# Patient Record
Sex: FEMALE | Race: BLACK OR AFRICAN AMERICAN | Employment: OTHER | URBAN - METROPOLITAN AREA
[De-identification: names, ages, dates, MRNs, and addresses within clinical notes are randomized per-mention and may not be internally consistent; named-entity substitution may affect disease eponyms.]

---

## 2017-01-18 DIAGNOSIS — E78.01 FAMILIAL HYPERCHOLESTEROLEMIA: ICD-10-CM

## 2017-01-18 RX ORDER — ATORVASTATIN CALCIUM 40 MG/1
40 TABLET, FILM COATED ORAL DAILY
Qty: 30 TAB | Refills: 0 | Status: SHIPPED | OUTPATIENT
Start: 2017-01-18 | End: 2017-04-28 | Stop reason: SDUPTHER

## 2017-01-18 NOTE — TELEPHONE ENCOUNTER
Patient arrived late for appointment 1/18/17, asked that she get a script for cholesterol medication as next appointment is not available until 1/30/17.      LOV- 7/6/2016  Last refill- 12/29/16    Only filled for 30 days

## 2017-02-13 ENCOUNTER — OFFICE VISIT (OUTPATIENT)
Dept: FAMILY MEDICINE CLINIC | Age: 67
End: 2017-02-13

## 2017-02-13 ENCOUNTER — HOSPITAL ENCOUNTER (OUTPATIENT)
Dept: LAB | Age: 67
Discharge: HOME | End: 2017-02-13
Payer: MEDICARE

## 2017-02-13 VITALS
HEART RATE: 100 BPM | TEMPERATURE: 98.2 F | HEIGHT: 66 IN | WEIGHT: 213.6 LBS | DIASTOLIC BLOOD PRESSURE: 78 MMHG | BODY MASS INDEX: 34.33 KG/M2 | OXYGEN SATURATION: 97 % | SYSTOLIC BLOOD PRESSURE: 152 MMHG | RESPIRATION RATE: 12 BRPM

## 2017-02-13 DIAGNOSIS — I10 ESSENTIAL HYPERTENSION: ICD-10-CM

## 2017-02-13 DIAGNOSIS — G47.33 OSA (OBSTRUCTIVE SLEEP APNEA): ICD-10-CM

## 2017-02-13 DIAGNOSIS — Z12.31 VISIT FOR SCREENING MAMMOGRAM: ICD-10-CM

## 2017-02-13 DIAGNOSIS — Z71.89 ACP (ADVANCE CARE PLANNING): ICD-10-CM

## 2017-02-13 DIAGNOSIS — Z00.00 MEDICARE ANNUAL WELLNESS VISIT, SUBSEQUENT: ICD-10-CM

## 2017-02-13 DIAGNOSIS — N39.41 URGE INCONTINENCE OF URINE: ICD-10-CM

## 2017-02-13 DIAGNOSIS — E78.2 MIXED HYPERLIPIDEMIA: ICD-10-CM

## 2017-02-13 DIAGNOSIS — Z23 ENCOUNTER FOR IMMUNIZATION: ICD-10-CM

## 2017-02-13 DIAGNOSIS — L30.9 ECZEMA, UNSPECIFIED TYPE: ICD-10-CM

## 2017-02-13 PROCEDURE — 85027 COMPLETE CBC AUTOMATED: CPT

## 2017-02-13 PROCEDURE — 80053 COMPREHEN METABOLIC PANEL: CPT

## 2017-02-13 PROCEDURE — 36415 COLL VENOUS BLD VENIPUNCTURE: CPT

## 2017-02-13 PROCEDURE — 83036 HEMOGLOBIN GLYCOSYLATED A1C: CPT

## 2017-02-13 PROCEDURE — 82043 UR ALBUMIN QUANTITATIVE: CPT

## 2017-02-13 PROCEDURE — 80061 LIPID PANEL: CPT

## 2017-02-13 RX ORDER — INSULIN ASPART 100 [IU]/ML
14-20 INJECTION, SOLUTION INTRAVENOUS; SUBCUTANEOUS 2 TIMES DAILY
COMMUNITY
Start: 2017-02-13 | End: 2021-06-18

## 2017-02-13 RX ORDER — PEN NEEDLE, DIABETIC 31 GX5/16"
NEEDLE, DISPOSABLE MISCELLANEOUS
Refills: 1 | COMMUNITY
Start: 2016-11-21

## 2017-02-13 RX ORDER — VALSARTAN 160 MG/1
160 TABLET ORAL DAILY
Qty: 30 TAB | Refills: 5 | Status: SHIPPED | OUTPATIENT
Start: 2017-02-13 | End: 2017-05-03 | Stop reason: DRUGHIGH

## 2017-02-13 RX ORDER — FLUCONAZOLE 150 MG/1
150 TABLET ORAL DAILY
Qty: 1 TAB | Refills: 0 | Status: SHIPPED | OUTPATIENT
Start: 2017-02-13 | End: 2017-02-14

## 2017-02-13 RX ORDER — TRIAMCINOLONE ACETONIDE 1 MG/G
CREAM TOPICAL 2 TIMES DAILY
Qty: 80 G | Refills: 1 | Status: SHIPPED | OUTPATIENT
Start: 2017-02-13 | End: 2018-01-13

## 2017-02-13 NOTE — PATIENT INSTRUCTIONS
Learning About High Cholesterol  What is high cholesterol? Cholesterol is a type of fat in your blood. It is needed for many body functions, such as making new cells. Cholesterol is made by your body. It also comes from food you eat. If you have too much cholesterol, it starts to build up in your arteries. This is called hardening of the arteries, or atherosclerosis. High cholesterol raises your risk of a heart attack and stroke. There are different types of cholesterol. LDL is the \"bad\" cholesterol. High LDL can raise your risk for heart disease, heart attack, and stroke. HDL is the \"good\" cholesterol. High HDL is linked with a lower risk for heart disease, heart attack, and stroke. Your cholesterol levels help your doctor find out your risk for having a heart attack or stroke. How can you prevent high cholesterol? A heart-healthy lifestyle can help you prevent high cholesterol. This lifestyle helps lower your risk for a heart attack and stroke. · Eat heart-healthy foods. ¨ Eat fruits, vegetables, whole grains (like oatmeal), dried beans and peas, nuts and seeds, soy products (like tofu), and fat-free or low-fat dairy products. ¨ Replace butter, margarine, and hydrogenated or partially hydrogenated oils with olive and canola oils. (Canola oil margarine without trans fat is fine.)  ¨ Replace red meat with fish, poultry, and soy protein (like tofu). ¨ Limit processed and packaged foods like chips, crackers, and cookies. · Be active. Exercise can improve your cholesterol level. Get at least 30 minutes of exercise on most days of the week. Walking is a good choice. You also may want to do other activities, such as running, swimming, cycling, or playing tennis or team sports. · Stay at a healthy weight. Lose weight if you need to. · Don't smoke. If you need help quitting, talk to your doctor about stop-smoking programs and medicines. These can increase your chances of quitting for good.   How is high cholesterol treated? The goal of treatment is to reduce your chances of having a heart attack or stroke. The goal is not to lower your cholesterol numbers only. · You may make lifestyle changes, such as eating healthy foods, not smoking, losing weight, and being more active. · You may have to take medicine. Follow-up care is a key part of your treatment and safety. Be sure to make and go to all appointments, and call your doctor if you are having problems. It's also a good idea to know your test results and keep a list of the medicines you take. Where can you learn more? Go to http://vignesh-nini.info/. Enter W157 in the search box to learn more about \"Learning About High Cholesterol. \"  Current as of: January 27, 2016  Content Version: 11.1  © 2722-6677 [employer]. Care instructions adapted under license by [facility] (which disclaims liability or warranty for this information). If you have questions about a medical condition or this instruction, always ask your healthcare professional. [place] [address] any warranty or liability for your use of this information.

## 2017-02-13 NOTE — PROGRESS NOTES
HISTORY OF PRESENT ILLNESS  Hugh Walden is a 73 y.o. female. HPI  Cardiovascular Review:  The patient has diabetes, hypertension and hyperlipidemia. Patient has sleep apnea but is not using her CPAP consistently. Diet and Lifestyle: not attempting to follow a low fat, low cholesterol diet, not attempting to follow a low sodium diet, does not rigorously follow a diabetic diet, exercises sporadically, nonsmoker  Home BP Monitoring: is not measured at home. Pertinent ROS: Taking medications regularly as instructed, no TIA's, no chest pain on exertion, no dyspnea on exertion, no swelling of ankles. Patient reports urge incontinence x last 6 months. Symptoms worse at night per patient. Requests Diflucan for yeast infection. Diabetes Mellitus:  She has diabetes mellitus, and  hypertension and hyperlipidemia. Seen by endocrinology - Dr Stewart Griffith. Taking Levemir 55 units QHS and Novolog 12-16 units before meals. Home glucose monitoring: fasting values range 170-200, nonfasting values range 200-300. Current Outpatient Prescriptions   Medication Sig Dispense Refill    BD INSULIN PEN NEEDLE UF MINI 31 gauge x 3/16\" ndle use four times a day for INSULIN INJECTIONS as directed  1    triamcinolone acetonide (KENALOG) 0.1 % topical cream Apply  to affected area two (2) times a day. use thin layer 80 g 1    valsartan (DIOVAN) 160 mg tablet Take 1 Tab by mouth daily. For blood pressure 30 Tab 5    pneumococcal 13 eric conj dip (PREVNAR-13) 0.5 mL syrg injection 0.5 mL by IntraMUSCular route once for 1 dose. 0.5 mL 0    insulin detemir (LEVEMIR FLEXPEN) 100 unit/mL (3 mL) inpn 55 Units by SubCUTAneous route nightly.  insulin aspart (NOVOLOG FLEXPEN) 100 unit/mL inpn 12 Units by SubCUTAneous route Before breakfast, lunch, and dinner.  fluconazole (DIFLUCAN) 150 mg tablet Take 1 Tab by mouth daily for 1 day. FDA advises cautious prescribing of oral fluconazole in pregnancy.  1 Tab 0    atorvastatin (LIPITOR) 40 mg tablet Take 1 Tab by mouth daily. For cholesterol 30 Tab 0    albuterol (PROVENTIL HFA, VENTOLIN HFA, PROAIR HFA) 90 mcg/actuation inhaler Take 1-2 Puffs by inhalation every four (4) hours as needed for Wheezing. 1 Inhaler 1    diclofenac EC (VOLTAREN) 75 mg EC tablet Take 1 Tab by mouth two (2) times a day. 20 Tab 0    ONETOUCH ULTRA TEST strip   0    halobetasol (ULTRAVATE) 0.05 % ointment   0    glimepiride (AMARYL) 4 mg tablet take 1 tablet every morning 30 Tab 0    metFORMIN (GLUCOPHAGE) 500 mg tablet Take 1,000 mg by mouth two (2) times daily (with meals).  Cyanocobalamin-Cobamamide (B-12 PLUS) 5,000-100 mcg subl by SubLINGual route daily.  ibuprofen (MOTRIN) 600 mg tablet Take 1 tablet by mouth every eight (8) hours as needed for Pain.  20 tablet 1     Past Medical History   Diagnosis Date    Allergic rhinitis     DDD (degenerative disc disease)      thoracic spine    DJD (degenerative joint disease) of cervical spine     Eczema     Hypertension     Lichen sclerosus      [facility]    ANMOL (obstructive sleep apnea)      CPAP    Other and unspecified hyperlipidemia     Reflux esophagitis     Type II or unspecified type diabetes mellitus without mention of complication, not stated as uncontrolled       Lab Results   Component Value Date/Time    Hemoglobin A1c 8.8 03/21/2016 01:20 PM    Hemoglobin A1c 8.8 12/23/2014 04:04 PM    Hemoglobin A1c 10.5 10/15/2014 02:58 PM    Hemoglobin A1c, External 9.2 01/21/2016    Glucose 81 03/21/2016 01:20 PM    Glucose (POC) 138 12/30/2014 02:08 PM    Microalb/Creat ratio (ug/mg creat.) 27.2 11/11/2015 12:10 PM    LDL, calculated 87 03/21/2016 01:20 PM    Creatinine 0.84 03/21/2016 01:20 PM      Lab Results   Component Value Date/Time    Cholesterol, total 167 03/21/2016 01:20 PM    HDL Cholesterol 63 03/21/2016 01:20 PM    LDL, calculated 87 03/21/2016 01:20 PM    Triglyceride 85 03/21/2016 01:20 PM    CHOL/HDL Ratio 3.5 08/30/2010 10:02 AM        Review of Systems   Constitutional: Negative for malaise/fatigue and weight loss. Respiratory: Negative for shortness of breath. Cardiovascular: Negative for chest pain, palpitations and leg swelling. Gastrointestinal: Negative for heartburn. Musculoskeletal: Negative for back pain, joint pain and myalgias. Neurological: Negative for dizziness, weakness and headaches. Psychiatric/Behavioral: Negative for depression. Physical Exam   Constitutional: She is oriented to person, place, and time. She appears well-developed and well-nourished. Neck: Normal range of motion. Neck supple. No JVD present. Carotid bruit is not present. No thyromegaly present. Cardiovascular: Normal rate, regular rhythm and intact distal pulses. Exam reveals no gallop and no friction rub. No murmur heard. Pulmonary/Chest: Effort normal and breath sounds normal. No respiratory distress. Musculoskeletal: She exhibits no edema. Lymphadenopathy:     She has no cervical adenopathy. Neurological: She is alert and oriented to person, place, and time. Skin:        Psychiatric: She has a normal mood and affect. Her behavior is normal.   Nursing note and vitals reviewed. Diabetic foot exam - no evidence of ulcerations of infection, diminished sensation with filament testing. Pedal pulse palpable. ASSESSMENT and PLAN  Spencer Vogt was seen today for diabetes, cholesterol problem and rash. Diagnoses and all orders for this visit:    Uncontrolled type 2 diabetes mellitus without complication, with long-term current use of insulin ([address]  Managed by endocrinology, no changes to current treatment plan. -     HEMOGLOBIN A1C WITH EAG  -     MICROALBUMIN, UR, RAND W/ MICROALBUMIN/CREA RATIO    Essential hypertension  Not to goal. Increase to Valsartan to 100 mg  -     METABOLIC PANEL, COMPREHENSIVE  -     CBC W/O DIFF  -     valsartan (DIOVAN) 160 mg tablet; Take 1 Tab by mouth daily.  For blood pressure    Mixed hyperlipidemia  -     Recheck LIPID PANEL    ANMOL (obstructive sleep apnea)  Discussed importance of CPAP compliance. Eczema, unspecified type  -     triamcinolone acetonide (KENALOG) 0.1 % topical cream; Apply  to affected area two (2) times a day. use thin layer    Urge incontinence of urine  -     REFERRAL TO UROGYNECOLOGY    Encounter for immunization  -     pneumococcal 13 eric conj dip (PREVNAR-13) 0.5 mL syrg injection; 0.5 mL by IntraMUSCular route once for 1 dose. Visit for screening mammogram  -     [facility] MAMMO BI SCREENING INCL CAD; Future    Medicare annual wellness visit, subsequent    ACP (advance care planning)  Discussed importance of living will; paperwork provided on [employer]    Other orders  -     fluconazole (DIFLUCAN) 150 mg tablet; Take 1 Tab by mouth daily for 1 day. FDA advises cautious prescribing of oral fluconazole in pregnancy. I have discussed the diagnosis with the patient and the intended plan as seen in the above orders. The patient has received an after-visit summary along with patient information handout. I have discussed medication side effects and warnings with the patient as well. Follow-up Disposition:  Return in about 6 months (around 8/13/2017) for cholesterol and blood pressure.

## 2017-02-13 NOTE — PROGRESS NOTES
Douglas Mcdonald is a 73 y.o. female and presents for annual Medicare Wellness Visit. Problem List: Reviewed with patient and discussed risk factors.     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    Reflux esophagitis K21.0    Degeneration of intervertebral disc, site unspecified [id number]    Cervical spondylosis without myelopathy M47.812    Contact dermatitis and other eczema, due to unspecified cause L25.9    Allergic rhinitis, cause unspecified J30.9    Type II diabetes mellitus, uncontrolled (HCC) E11.65    Vitamin D deficiency O83.5    Lichen sclerosus N12.3    Hypertension I10    ACP (advance care planning) Z71.89       Current medical providers:  Patient Care Team:  Edilia Gerard MD as PCP - General  Aron Ponce MD (Endocrinology)  Dong Witt    PSH: Reviewed with patient  Past Surgical History   Procedure Laterality Date    Hx tonsil and adenoidectomy      Hx hip replacement  2004     rt    Pr dcmprn perq nucleus pulposus 1/> levels lumbar  2003     L3-4    Pr palatophayngoplasty  2002    Hx tubal ligation      Hx gyn       benign tumors removed from uterus    Hx colonoscopy  2013    Hx heent       sinus surgery for snoring    Hx back surgery       x 3    Hx orthopaedic Bilateral July 2013     hip replacement        SH: Reviewed with patient  Social History   Substance Use Topics    Smoking status: Never Smoker    Smokeless tobacco: Never Used    Alcohol use Yes      Comment: occasional       FH: Reviewed with patient  Family History   Problem Relation Age of Onset    Cancer Maternal Grandfather      lung    Cancer Paternal Grandmother     Cancer Paternal Grandfather      colon    Heart Attack Mother     Dementia Father      alzheimers    Dementia Maternal Grandmother      alzheimers       Medications/Allergies: Reviewed with patient  Current Outpatient Prescriptions on File Prior to Visit   Medication Sig Dispense Refill    atorvastatin (LIPITOR) 40 mg tablet Take 1 Tab by mouth daily. For cholesterol 30 Tab 0    albuterol (PROVENTIL HFA, VENTOLIN HFA, PROAIR HFA) 90 mcg/actuation inhaler Take 1-2 Puffs by inhalation every four (4) hours as needed for Wheezing. 1 Inhaler 1    diclofenac EC (VOLTAREN) 75 mg EC tablet Take 1 Tab by mouth two (2) times a day. 20 Tab 0    ONETOUCH ULTRA TEST strip   0    halobetasol (ULTRAVATE) 0.05 % ointment   0    glimepiride (AMARYL) 4 mg tablet take 1 tablet every morning 30 Tab 0    metFORMIN (GLUCOPHAGE) 500 mg tablet Take 1,000 mg by mouth two (2) times daily (with meals).  Cyanocobalamin-Cobamamide (B-12 PLUS) 5,000-100 mcg subl by SubLINGual route daily.  ibuprofen (MOTRIN) 600 mg tablet Take 1 tablet by mouth every eight (8) hours as needed for Pain. 20 tablet 1     No current facility-administered medications on file prior to visit. Allergies   Allergen Reactions    Aspirin Nausea and Vomiting     Can take a coated aspirin.  Ciprocinonide Swelling    Crestor [Rosuvastatin] Myalgia    Seafood [Shellfish Containing Products] Swelling     Pt states she does not have a problem with shellfish. Had previous problem with scallops, but has eaten since.  Sulfa (Sulfonamide Antibiotics) Hives       Objective:  Visit Vitals    /78 (BP 1 Location: Left arm, BP Patient Position: Sitting)    Pulse 100    Temp 98.2 °F (36.8 °C) (Oral)    Resp 12    Ht 5' 6\" (1.676 m)    Wt 213 lb 9.6 oz (96.9 kg)    LMP 09/23/2009    SpO2 97%    BMI 34.48 kg/m2    Body mass index is 34.48 kg/(m^2).     Assessment of cognitive impairment: Alert and oriented x 3    Depression Screen:   PHQ 2 / 9, over the last two weeks 2/13/2017   Little interest or pleasure in doing things Not at all   Feeling down, depressed or hopeless Not at all   Total Score PHQ 2 0       Fall Risk Assessment:    Fall Risk Assessment, last 12 mths 2/13/2017   Able to walk? Yes   Fall in past 12 months? No       Functional Ability:   Does the patient exhibit a steady gait? yes   How long did it take the patient to get up and walk from a sitting position? 3 sec   Is the patient self reliant?  (ie can do own laundry, meals, household chores)  yes     Does the patient handle his/her own medications? yes     Does the patient handle his/her own money? yes     Is the patients home safe (ie good lighting, handrails on stairs and bath, etc.)? yes     Did you notice or did patient express any hearing difficulties? no     Did you notice or did patient express any vision difficulties?   no     Were distance and reading eye charts used? no       Advance Care Planning:   Patient was offered the opportunity to discuss advance care planning:  yes     Does patient have an Advance Directive:  no   If no, did you provide information on Caring Connections?   yes       Plan:      Orders Placed This Encounter    [facility] MAMMO BI SCREENING INCL CAD    LIPID PANEL    METABOLIC PANEL, COMPREHENSIVE    HEMOGLOBIN A1C WITH EAG    MICROALBUMIN, UR, RAND W/ MICROALBUMIN/CREA RATIO    CBC W/O DIFF    REFERRAL TO UROGYNECOLOGY    BD INSULIN PEN NEEDLE UF MINI 31 gauge x 3/16\" ndle    triamcinolone acetonide (KENALOG) 0.1 % topical cream    valsartan (DIOVAN) 160 mg tablet    pneumococcal 13 eric conj dip (PREVNAR-13) 0.5 mL syrg injection    insulin detemir (LEVEMIR FLEXPEN) 100 unit/mL (3 mL) inpn    insulin aspart (NOVOLOG FLEXPEN) 100 unit/mL inpn    fluconazole (DIFLUCAN) 150 mg tablet       Health Maintenance   Topic Date Due    EYE EXAM RETINAL OR DILATED Q1  02/26/2014    GLAUCOMA SCREENING Q2Y  09/16/2016    Pneumococcal 65+ Low/Medium Risk (1 of 2 - PCV13) 09/16/2016    HEMOGLOBIN A1C Q6M  09/21/2016    MEDICARE YEARLY EXAM  11/11/2016    FOOT EXAM Q1  11/11/2016    MICROALBUMIN Q1  11/11/2016    LIPID PANEL Q1  03/21/2017    BREAST CANCER SCRN MAMMOGRAM  12/17/2017    COLONOSCOPY  02/11/2023    DTaP/Tdap/Td series (2 - Td) 02/13/2027    Hepatitis C Screening  Addressed    OSTEOPOROSIS SCREENING (DEXA)  Completed    ZOSTER VACCINE AGE 60>  Addressed    INFLUENZA AGE 9 TO ADULT  Addressed       *Patient verbalized understanding and agreement with the plan. A copy of the After Visit Summary with personalized health plan was given to the patient today.

## 2017-02-13 NOTE — MR AVS SNAPSHOT
Visit Information Date & Time Provider Department Dept. Phone Encounter #  
 2/13/2017 10:45 AM Jesus Koo  [facility] [phone] [id number] Follow-up Instructions Return in about 6 months (around 8/13/2017) for cholesterol and blood pressure. Upcoming Health Maintenance Date Due  
 EYE EXAM RETINAL OR DILATED Q1 2/26/2014 GLAUCOMA SCREENING Q2Y 9/16/2016 Pneumococcal 65+ Low/Medium Risk (1 of 2 - PCV13) 9/16/2016 HEMOGLOBIN A1C Q6M 9/21/2016 MEDICARE YEARLY EXAM 11/11/2016 FOOT EXAM Q1 11/11/2016 MICROALBUMIN Q1 11/11/2016 LIPID PANEL Q1 3/21/2017 BREAST CANCER SCRN MAMMOGRAM 12/17/2017 COLONOSCOPY 2/11/2023 DTaP/Tdap/Td series (2 - Td) 2/13/2027 Allergies as of 2/13/2017  Review Complete On: 2/13/2017 By: Kelly Walker LPN Severity Noted Reaction Type Reactions Aspirin  03/06/2010    Nausea and Vomiting Can take a coated aspirin. Ciprocinonide  03/06/2010    Swelling Crestor [Rosuvastatin]  03/06/2010    Myalgia Seafood [Shellfish Containing Products]  03/06/2010    Swelling Pt states she does not have a problem with shellfish. Had previous problem with scallops, but has eaten since. Sulfa (Sulfonamide Antibiotics)  03/06/2010    Hives Current Immunizations  Reviewed on 10/8/2012 Name Date  
 dT Vaccine 10/25/2000 Not reviewed this visit You Were Diagnosed With   
  
 Codes Comments Uncontrolled type 2 diabetes mellitus without complication, with long-term current use of insulin ([facility] [address]    -  Primary ICD-10-CM: E11.65, Z79.4 ICD-9-CM: 250.02, V58.67 Essential hypertension     ICD-10-CM: I10 
ICD-9-CM: 401.9 Mixed hyperlipidemia     ICD-10-CM: E78.2 ICD-9-CM: 272.2   
 ANMOL (obstructive sleep apnea)     ICD-10-CM: G47.33 
ICD-9-CM: 327.23 Medicare annual wellness visit, subsequent     ICD-10-CM: Z00.00 ICD-9-CM: V70.0 ACP (advance care planning)     ICD-10-CM: Z71.89 ICD-9-CM: V65.49 Eczema, unspecified type     ICD-10-CM: L30.9 ICD-9-CM: 692.9 Urge incontinence of urine     ICD-10-CM: N39.41 
ICD-9-CM: 788.31 Encounter for immunization     ICD-10-CM: B14 ICD-9-CM: V03.89 Visit for screening mammogram     ICD-10-CM: Z12.31 
ICD-9-CM: V76.12 Vitals BP Pulse Temp Resp Height(growth percentile) Weight(growth percentile) 152/78 (BP 1 Location: Left arm, BP Patient Position: Sitting) 100 98.2 °F (36.8 °C) (Oral) 12 5' 6\" (1.676 m) 213 lb 9.6 oz (96.9 kg) LMP SpO2 BMI OB Status Smoking Status 09/23/2009 97% 34.48 kg/m2 Postmenopausal Never Smoker Vitals History BMI and BSA Data Body Mass Index Body Surface Area 34.48 kg/m 2 2.12 m 2 [facility]erred [facility] [address] [phone] Your Updated Medication List  
  
   
This list is accurate as of: 2/13/17 11:37 AM.  Always use your most recent med list.  
  
  
  
  
 albuterol 90 mcg/actuation inhaler Commonly known as:  PROVENTIL HFA, VENTOLIN HFA, PROAIR HFA Take 1-2 Puffs by inhalation every four (4) hours as needed for Wheezing. atorvastatin 40 mg tablet Commonly known as:  LIPITOR Take 1 Tab by mouth daily. For cholesterol B-12 PLUS 5,000-100 mcg Subl Generic drug:  Cyanocobalamin-Cobamamide  
by SubLINGual route daily. BD INSULIN PEN NEEDLE UF MINI 31 gauge x 3/16\" Ndle Generic drug:  Insulin Needles (Disposable)  
use four times a day for INSULIN INJECTIONS as directed  
  
 diclofenac EC 75 mg EC tablet Commonly known as:  VOLTAREN Take 1 Tab by mouth two (2) times a day. fluconazole 150 mg tablet Commonly known as:  DIFLUCAN Take 1 Tab by mouth daily for 1 day. FDA advises cautious prescribing of oral fluconazole in pregnancy. glimepiride 4 mg tablet Commonly known as:  AMARYL  
take 1 tablet every morning  
  
 halobetasol 0.05 % ointment Commonly known as:  ULTRAVATE  
  
 ibuprofen 600 mg tablet Commonly known as:  MOTRIN Take 1 tablet by mouth every eight (8) hours as needed for Pain. LEVEMIR FLEXPEN 100 unit/mL (3 mL) In Generic drug:  insulin detemir 55 Units by SubCUTAneous route nightly. metFORMIN 500 mg tablet Commonly known as:  GLUCOPHAGE Take 1,000 mg by mouth two (2) times daily (with meals). NovoLOG Flexpen 100 unit/mL Inpn Generic drug:  insulin aspart 12 Units by SubCUTAneous route Before breakfast, lunch, and dinner. ONETOUCH ULTRA TEST strip Generic drug:  glucose blood VI test strips  
  
 pneumococcal 13 eric conj dip 0.5 mL Syrg injection Commonly known as:  PREVNAR-13  
0.5 mL by IntraMUSCular route once for 1 dose. triamcinolone acetonide 0.1 % topical cream  
Commonly known as:  KENALOG Apply  to affected area two (2) times a day. use thin layer  
  
 valsartan 160 mg tablet Commonly known as:  DIOVAN Take 1 Tab by mouth daily. For blood pressure Prescriptions Printed Refills  
 pneumococcal 13 eric conj dip (PREVNAR-13) 0.5 mL syrg injection 0 Si.5 mL by IntraMUSCular route once for 1 dose. Class: Print Route: IntraMUSCular Prescriptions Sent to Pharmacy Refills  
 triamcinolone acetonide (KENALOG) 0.1 % topical cream 1 Sig: Apply  to affected area two (2) times a day. use thin layer Class: Normal  
 [facility] [phone] Route: Topical  
 valsartan (DIOVAN) 160 mg tablet 5 Sig: Take 1 Tab by mouth daily. For blood pressure Class: Normal  
 Pharmacy: [facility] Ph #: [phone]  Route: Oral  
 fluconazole (DIFLUCAN) 150 mg tablet 0  
 Sig: Take 1 Tab by mouth daily for 1 day. FDA advises cautious prescribing of oral fluconazole in pregnancy. Class: Normal  
 [facility]: [facility] Ph #: [phone] Route: Oral  
  
We Performed the Following CBC W/O DIFF [20011 CPT(R)] HEMOGLOBIN A1C WITH EAG [49517 CPT(R)] LIPID PANEL [72362 CPT(R)] METABOLIC PANEL, COMPREHENSIVE [56598 CPT(R)] MICROALBUMIN, UR, RAND W/ MICROALBUMIN/CREA RATIO [id number] CPT(R)] REFERRAL TO UROGYNECOLOGY [id number] CPT(R)] Comments:  
 Please evaluate patient for urge incontinence. Follow-up Instructions Return in about 6 months (around 8/13/2017) for cholesterol and blood pressure. To-Do List   
 Around 02/17/2017 Imaging:  [facility] MAMMO BI SCREENING INCL CAD Referral Information Referral ID Referred By Referred To  
  
 [id number] Brody Garvin [facility] [address], [address] Visits Status Start Date End Date 1 New Request 2/13/17 2/13/18 If your referral has a status of pending review or denied, additional information will be sent to support the outcome of this decision. Patient Instructions Learning About High Cholesterol What is high cholesterol? Cholesterol is a type of fat in your blood. It is needed for many body functions, such as making new cells. Cholesterol is made by your body. It also comes from food you eat. If you have too much cholesterol, it starts to build up in your arteries. This is called hardening of the arteries, or atherosclerosis. High cholesterol raises your risk of a heart attack and stroke. There are different types of cholesterol. LDL is the \"bad\" cholesterol. High LDL can raise your risk for heart disease, heart attack, and stroke. HDL is the \"good\" cholesterol.  High HDL is linked with a lower risk for heart disease, heart attack, and stroke. Your cholesterol levels help your doctor find out your risk for having a heart attack or stroke. How can you prevent high cholesterol? A heart-healthy lifestyle can help you prevent high cholesterol. This lifestyle helps lower your risk for a heart attack and stroke. · Eat heart-healthy foods. ¨ Eat fruits, vegetables, whole grains (like oatmeal), dried beans and peas, nuts and seeds, soy products (like tofu), and fat-free or low-fat dairy products. ¨ Replace butter, margarine, and hydrogenated or partially hydrogenated oils with olive and canola oils. (Canola oil margarine without trans fat is fine.) ¨ Replace red meat with fish, poultry, and soy protein (like tofu). ¨ Limit processed and packaged foods like chips, crackers, and cookies. · Be active. Exercise can improve your cholesterol level. Get at least 30 minutes of exercise on most days of the week. Walking is a good choice. You also may want to do other activities, such as running, swimming, cycling, or playing tennis or team sports. · Stay at a healthy weight. Lose weight if you need to. · Don't smoke. If you need help quitting, talk to your doctor about stop-smoking programs and medicines. These can increase your chances of quitting for good. How is high cholesterol treated? The goal of treatment is to reduce your chances of having a heart attack or stroke. The goal is not to lower your cholesterol numbers only. · You may make lifestyle changes, such as eating healthy foods, not smoking, losing weight, and being more active. · You may have to take medicine. Follow-up care is a key part of your treatment and safety. Be sure to make and go to all appointments, and call your doctor if you are having problems. It's also a good idea to know your test results and keep a list of the medicines you take. Where can you learn more? Go to http://vignesh-nini.info/. Enter T206 in the search box to learn more about \"Learning About High Cholesterol. \" Current as of: January 27, 2016 Content Version: 11.1 © 9949-5626 [facility]. Care instructions adapted under license by [employer] (which disclaims liability or warranty for this information). If you have questions about a medical condition or this instruction, always ask your healthcare professional. [address] any warranty or liability for your use of this information. Introducing [facility]! Hedy John introduces Gde[employer] patient portal. Now you can access parts of your medical record, email your doctor's office, and request medication refills online. 1. In your internet browser, go to [email or web address] 2. Click on the First Time User? Click Here link in the Sign In box. You will see the New Member Sign Up page. 3. Enter your GdeSlon Access Code exactly as it appears below. You will not need to use this code after youve completed the sign-up process. If you do not sign up before the expiration date, you must request a new code. · Gde[employer]on Access Code: [email or web address] Expires: 5/14/2017 11:37 AM 
 
4. Enter the last four digits of your Social Security Number (xxxx) and Date of Birth (mm/dd/yyyy) as indicated and click Submit. You will be taken to the next sign-up page. 5. Create a GdeSlon ID. This will be your GdeSlon login ID and cannot be changed, so think of one that is secure and easy to remember. 6. Create a GdeSlon password. You can change your password at any time. 7. Enter your Password Reset Question and Answer. This can be used at a later time if you forget your password. 8. Enter your e-mail address. You will receive e-mail notification when new information is available in [address]. 9. Click Sign Up. You can now view and download portions of your medical record. 10. Click the Download Summary menu link to download a portable copy of your medical information. If you have questions, please visit the Frequently Asked Questions section of the [employer] website. Remember, [employer] is NOT to be used for urgent needs. For medical emergencies, dial 911. Now available from your iPhone and Android! Please provide this summary of care documentation to your next provider. Your primary care clinician is listed as [place] [address], [facility]/s . If you have any questions after today's visit, please call [phone].

## 2017-02-13 NOTE — PROGRESS NOTES
1. Have you been to the ER, urgent care clinic since your last visit? Hospitalized since your last visit? No    2. Have you seen or consulted any other health care providers outside of the [address] [place] [address] since your last visit? Include any pap smears or colon screening. No       Chief Complaint   Patient presents with    Diabetes    Cholesterol Problem    Rash     on the back angie and rists of both hands- for the last 2-3 months     Would like to have stool test done.

## 2017-02-14 LAB
ALBUMIN SERPL-MCNC: 4.6 G/DL (ref 3.6–4.8)
ALBUMIN/CREAT UR: 35.7 MG/G CREAT (ref 0–30)
ALBUMIN/GLOB SERPL: 1.5 {RATIO} (ref 1.1–2.5)
ALP SERPL-CCNC: 167 IU/L (ref 39–117)
ALT SERPL-CCNC: 17 IU/L (ref 0–32)
AST SERPL-CCNC: 14 IU/L (ref 0–40)
BILIRUB SERPL-MCNC: 0.3 MG/DL (ref 0–1.2)
BUN SERPL-MCNC: 16 MG/DL (ref 8–27)
BUN/CREAT SERPL: 17 (ref 11–26)
CALCIUM SERPL-MCNC: 10.2 MG/DL (ref 8.7–10.3)
CHLORIDE SERPL-SCNC: 98 MMOL/L (ref 96–106)
CHOLEST SERPL-MCNC: 189 MG/DL (ref 100–199)
CO2 SERPL-SCNC: 25 MMOL/L (ref 18–29)
CREAT SERPL-MCNC: 0.96 MG/DL (ref 0.57–1)
CREAT UR-MCNC: 152.4 MG/DL
ERYTHROCYTE [DISTWIDTH] IN BLOOD BY AUTOMATED COUNT: 15.9 % (ref 12.3–15.4)
EST. AVERAGE GLUCOSE BLD GHB EST-MCNC: 243 MG/DL
GLOBULIN SER CALC-MCNC: 3.1 G/DL (ref 1.5–4.5)
GLUCOSE SERPL-MCNC: 114 MG/DL (ref 65–99)
HBA1C MFR BLD: 10.1 % (ref 4.8–5.6)
HCT VFR BLD AUTO: 37.2 % (ref 34–46.6)
HDLC SERPL-MCNC: 61 MG/DL
HGB BLD-MCNC: 11.7 G/DL (ref 11.1–15.9)
INTERPRETATION, 910389: NORMAL
LDLC SERPL CALC-MCNC: 104 MG/DL (ref 0–99)
MCH RBC QN AUTO: 24.5 PG (ref 26.6–33)
MCHC RBC AUTO-ENTMCNC: 31.5 G/DL (ref 31.5–35.7)
MCV RBC AUTO: 78 FL (ref 79–97)
MICROALBUMIN UR-MCNC: 54.4 UG/ML
PLATELET # BLD AUTO: 333 X10E3/UL (ref 150–379)
POTASSIUM SERPL-SCNC: 4.4 MMOL/L (ref 3.5–5.2)
PROT SERPL-MCNC: 7.7 G/DL (ref 6–8.5)
RBC # BLD AUTO: 4.78 X10E6/UL (ref 3.77–5.28)
SODIUM SERPL-SCNC: 142 MMOL/L (ref 134–144)
TRIGL SERPL-MCNC: 121 MG/DL (ref 0–149)
VLDLC SERPL CALC-MCNC: 24 MG/DL (ref 5–40)
WBC # BLD AUTO: 11.2 X10E3/UL (ref 3.4–10.8)

## 2017-02-19 DIAGNOSIS — D72.829 LEUKOCYTOSIS, UNSPECIFIED TYPE: Primary | ICD-10-CM

## 2017-02-27 ENCOUNTER — HOSPITAL ENCOUNTER (OUTPATIENT)
Dept: MAMMOGRAPHY | Age: 67
Discharge: HOME | End: 2017-02-27
Payer: MEDICARE

## 2017-02-27 DIAGNOSIS — Z12.31 VISIT FOR SCREENING MAMMOGRAM: ICD-10-CM

## 2017-02-27 PROCEDURE — 77067 SCR MAMMO BI INCL CAD: CPT

## 2017-04-28 DIAGNOSIS — E78.01 FAMILIAL HYPERCHOLESTEROLEMIA: ICD-10-CM

## 2017-04-28 RX ORDER — ATORVASTATIN CALCIUM 40 MG/1
40 TABLET, FILM COATED ORAL DAILY
Qty: 30 TAB | Refills: 5 | Status: SHIPPED | OUTPATIENT
Start: 2017-04-28 | End: 2018-02-20 | Stop reason: SDUPTHER

## 2017-04-28 NOTE — TELEPHONE ENCOUNTER
Received faxed refill request for this medication from the pharmacy that is on file. atorvastatin (LIPITOR) 40 mg tablet  Allergy/Contraindication:RosuvastatinReactions:Myalgia  Take 1 Tab by mouth daily.  For cholesterol, Normal, Disp-30 Tab, R-0

## 2017-05-03 ENCOUNTER — OFFICE VISIT (OUTPATIENT)
Dept: FAMILY MEDICINE CLINIC | Age: 67
End: 2017-05-03

## 2017-05-03 VITALS
OXYGEN SATURATION: 99 % | TEMPERATURE: 98.2 F | BODY MASS INDEX: 35.32 KG/M2 | RESPIRATION RATE: 12 BRPM | HEART RATE: 96 BPM | HEIGHT: 66 IN | SYSTOLIC BLOOD PRESSURE: 151 MMHG | DIASTOLIC BLOOD PRESSURE: 80 MMHG | WEIGHT: 219.8 LBS

## 2017-05-03 DIAGNOSIS — I10 ESSENTIAL HYPERTENSION: ICD-10-CM

## 2017-05-03 DIAGNOSIS — Z23 ENCOUNTER FOR IMMUNIZATION: ICD-10-CM

## 2017-05-03 DIAGNOSIS — R60.9 PERIPHERAL EDEMA: Primary | ICD-10-CM

## 2017-05-03 RX ORDER — HYDROCHLOROTHIAZIDE 25 MG/1
25 TABLET ORAL DAILY
Qty: 30 TAB | Refills: 5 | Status: SHIPPED | OUTPATIENT
Start: 2017-05-03 | End: 2017-07-13 | Stop reason: ALTCHOICE

## 2017-05-03 RX ORDER — VALSARTAN 80 MG/1
80 TABLET ORAL
COMMUNITY
Start: 2017-05-03 | End: 2019-01-09

## 2017-05-03 RX ORDER — INSULIN DEGLUDEC INJECTION 200 U/ML
INJECTION, SOLUTION SUBCUTANEOUS
Refills: 0 | COMMUNITY
Start: 2017-04-26 | End: 2017-09-29 | Stop reason: ALTCHOICE

## 2017-05-03 RX ORDER — LATANOPROST 50 UG/ML
SOLUTION/ DROPS OPHTHALMIC
Refills: 0 | COMMUNITY
Start: 2017-04-27 | End: 2018-02-26

## 2017-05-03 NOTE — PROGRESS NOTES
Marino Parnell is a 73 y.o. female who was seen in clinic today (5/3/2017). Subjective:  Cardiovascular Review:  The patient has diabetes, hypertension and hyperlipidemia. Patient has sleep apnea but is not using her CPAP consistently. Diet and Lifestyle: not attempting to follow a low fat, low cholesterol diet, not attempting to follow a low sodium diet, does not rigorously follow a diabetic diet, exercises sporadically, nonsmoker  Home BP Monitoring: is not measured at home. Pertinent ROS: Taking medications regularly as instructed, no TIA's, no chest pain on exertion, no dyspnea on exertion, no swelling of ankles.      Diabetes Mellitus:  She has diabetes mellitus, and hypertension and hyperlipidemia. Seen by endocrinology - Dr Zacarias Garnett. Taking Tresiba 60 units QHS and Novolog 12-16 units before meals. Home glucose monitoring: fasting values range 150-200 , nonfasting values range <250.      Edema  Patient complains of edema. The location of the edema is lower legs. Onset of symptoms was 2 weeks ago, intermittent since that time. The swelling has been aggravated by dependency of involved area, hot weather, increased salt intake, relieved by elevation of involved area. Denies chest pain or SOB. Prior to Admission medications    Medication Sig Start Date End Date Taking? Authorizing Provider   TRESIBA FLEXTOUCH U-200 200 unit/mL (3 mL) inpn 60 units at bedtime 4/26/17  Yes Historical Provider   latanoprost (XALATAN) 0.005 % ophthalmic solution PLACE 1 DROP IN BOTH EYES EVERY EVENING 4/27/17  Yes Historical Provider   valsartan (DIOVAN) 80 mg tablet Take 1 Tab by mouth daily. 5/3/17  Yes Arpan Hernández NP   hydroCHLOROthiazide (HYDRODIURIL) 25 mg tablet Take 1 Tab by mouth daily. For blood pressure and swelling 5/3/17  Yes Arpan Hernández NP   pneumococcal 13 eric conj dip (PREVNAR-13) 0.5 mL syrg injection 0.5 mL by IntraMUSCular route once for 1 dose.  5/3/17 5/3/17 Yes Arpan Hernández NP   atorvastatin (LIPITOR) 40 mg tablet Take 1 Tab by mouth daily. For cholesterol 4/28/17  Yes Duran Christopher NP   BD INSULIN PEN NEEDLE UF MINI 31 gauge x 3/16\" ndle use four times a day for INSULIN INJECTIONS as directed 11/21/16  Yes Historical Provider   triamcinolone acetonide (KENALOG) 0.1 % topical cream Apply  to affected area two (2) times a day. use thin layer 2/13/17  Yes Duran Christopher NP   insulin aspart (NOVOLOG FLEXPEN) 100 unit/mL inpn 12 Units by SubCUTAneous route Before breakfast, lunch, and dinner. 2/13/17  Yes Duran Christopher NP   albuterol (PROVENTIL HFA, VENTOLIN HFA, PROAIR HFA) 90 mcg/actuation inhaler Take 1-2 Puffs by inhalation every four (4) hours as needed for Wheezing. 7/6/16  Yes Duran Christopher NP   ONETOUCH ULTRA TEST strip  10/21/15  Yes Historical Provider   halobetasol (ULTRAVATE) 0.05 % ointment  8/14/15  Yes Historical Provider   glimepiride (AMARYL) 4 mg tablet take 1 tablet every morning 5/11/13  Yes Rhina Figueroa MD   metFORMIN (GLUCOPHAGE) 500 mg tablet Take 1,000 mg by mouth two (2) times daily (with meals). Yes Historical Provider   insulin detemir (LEVEMIR FLEXPEN) 100 unit/mL (3 mL) inpn 55 Units by SubCUTAneous route nightly. 2/13/17   Duran Christopher NP          Allergies   Allergen Reactions    Aspirin Nausea and Vomiting     Can take a coated aspirin.  Ciprocinonide Swelling    Crestor [Rosuvastatin] Myalgia    Seafood [Shellfish Containing Products] Swelling     Pt states she does not have a problem with shellfish. Had previous problem with scallops, but has eaten since.  Sulfa (Sulfonamide Antibiotics) Hives           ROS  See HPI    Objective:   Physical Exam   Constitutional: She is oriented to person, place, and time. She appears well-developed and well-nourished. Neck: Normal range of motion. Neck supple. No JVD present. Carotid bruit is not present. No thyromegaly present.    Cardiovascular: Normal rate, regular rhythm and intact distal pulses. Exam reveals no gallop and no friction rub. No murmur heard. Pulmonary/Chest: Effort normal and breath sounds normal. No respiratory distress. Musculoskeletal: She exhibits edema (1+ pitting edema). Lymphadenopathy:     She has no cervical adenopathy. Neurological: She is alert and oriented to person, place, and time. Psychiatric: She has a normal mood and affect. Her behavior is normal.   Nursing note and vitals reviewed. Diabetic foot exam - no evidence of ulcerations of infection, sensation intact with filament testing. Pedal pulse palpable. Visit Vitals    /80 (BP 1 Location: Left arm, BP Patient Position: Sitting)    Pulse 96    Temp 98.2 °F (36.8 °C) (Oral)    Resp 12    Ht 5' 6\" (1.676 m)    Wt 219 lb 12.8 oz (99.7 kg)    LMP 09/23/2009    SpO2 99%    BMI 35.48 kg/m2       Assessment & Plan:  Fernando Rose was seen today for foot swelling and numbness. Diagnoses and all orders for this visit:    Peripheral edema  Reviewed low sodium diet and weight loss. Add HCTZ    Essential hypertension  Not to goal. Continue Diovan 80 mg daily. Add HCTZ. -     hydroCHLOROthiazide (HYDRODIURIL) 25 mg tablet; Take 1 Tab by mouth daily. For blood pressure and swelling    Uncontrolled type 2 diabetes mellitus without complication, with long-term current use of insulin ([address]  Managed by endocrinology, no changes to therapy. Reviewed diet and lifestyle changes.  -      DIABETES FOOT EXAM    Encounter for immunization  -     pneumococcal 13 eric conj dip (PREVNAR-13) 0.5 mL syrg injection; 0.5 mL by IntraMUSCular route once for 1 dose. I have discussed the diagnosis with the patient and the intended plan as seen in the above orders. The patient has received an after-visit summary along with patient information handout. I have discussed medication side effects and warnings with the patient as well.     Follow-up Disposition:  Return in about 4 weeks (around 5/31/2017) for blood pressure.         Veronika Varghese NP

## 2017-05-03 NOTE — PATIENT INSTRUCTIONS
Leg and Ankle Edema: Care Instructions  Your Care Instructions  Swelling in the legs, ankles, and feet is called edema. It is common after you sit or stand for a while. Long plane flights or car rides often cause swelling in the legs and feet. You may also have swelling if you have to stand for long periods of time at your job. Problems with the veins in the legs (varicose veins) and changes in hormones can also cause swelling. Sometimes the swelling in the ankles and feet is caused by a more serious problem, such as heart failure, infection, blood clots, or liver or kidney disease. Follow-up care is a key part of your treatment and safety. Be sure to make and go to all appointments, and call your doctor if you are having problems. Its also a good idea to know your test results and keep a list of the medicines you take. How can you care for yourself at home? · If your doctor gave you medicine, take it as prescribed. Call your doctor if you think you are having a problem with your medicine. · Whenever you are resting, raise your legs up. Try to keep the swollen area higher than the level of your heart. · Take breaks from standing or sitting in one position. ¨ Walk around to increase the blood flow in your lower legs. ¨ Move your feet and ankles often while you stand, or tighten and relax your leg muscles. · Wear support stockings. Put them on in the morning, before swelling gets worse. · Eat a balanced diet. Lose weight if you need to. · Limit the amount of salt (sodium) in your diet. Salt holds fluid in the body and may increase swelling. When should you call for help? Call 911 anytime you think you may need emergency care. For example, call if:  · You have symptoms of a blood clot in your lung (called a pulmonary embolism). These may include:  ¨ Sudden chest pain. ¨ Trouble breathing. ¨ Coughing up blood.   Call your doctor now or seek immediate medical care if:  · You have signs of a blood clot, such as:  ¨ Pain in your calf, back of the knee, thigh, or groin. ¨ Redness and swelling in your leg or groin. · You have symptoms of infection, such as:  ¨ Increased pain, swelling, warmth, or redness. ¨ Red streaks or pus. ¨ A fever. Watch closely for changes in your health, and be sure to contact your doctor if:  · Your swelling is getting worse. · You have new or worsening pain in your legs. · You do not get better as expected. Where can you learn more? Go to http://vignesh-nini.info/. Enter U726 in the search box to learn more about \"Leg and Ankle Edema: Care Instructions. \"  Current as of: May 27, 2016  Content Version: 11.2  © 6196-4242 [employer]. Care instructions adapted under license by [employer] (which disclaims liability or warranty for this information). If you have questions about a medical condition or this instruction, always ask your healthcare professional. [place] [address] any warranty or liability for your use of this information.

## 2017-05-03 NOTE — MR AVS SNAPSHOT
Visit Information Date & Time Provider Department Dept. Phone Encounter #  
 5/3/2017  2:30 PM Analia Womack  [facility] [phone] [id number] Follow-up Instructions Return in about 4 weeks (around 5/31/2017) for blood pressure. Upcoming Health Maintenance Date Due  
 EYE EXAM RETINAL OR DILATED Q1 2/26/2014 GLAUCOMA SCREENING Q2Y 9/16/2016 Pneumococcal 65+ High/Highest Risk (1 of 2 - PCV13) 9/16/2016 FOOT EXAM Q1 11/11/2016 INFLUENZA AGE 9 TO ADULT 8/1/2017 HEMOGLOBIN A1C Q6M 8/13/2017 MICROALBUMIN Q1 2/13/2018 LIPID PANEL Q1 2/13/2018 MEDICARE YEARLY EXAM 2/14/2018 BREAST CANCER SCRN MAMMOGRAM 2/27/2019 COLONOSCOPY 2/11/2023 DTaP/Tdap/Td series (2 - Td) 2/13/2027 Allergies as of 5/3/2017  Review Complete On: 5/3/2017 By: Indigo Henderson LPN Severity Noted Reaction Type Reactions Aspirin  03/06/2010    Nausea and Vomiting Can take a coated aspirin. Ciprocinonide  03/06/2010    Swelling Crestor [Rosuvastatin]  03/06/2010    Myalgia Seafood [Shellfish Containing Products]  03/06/2010    Swelling Pt states she does not have a problem with shellfish. Had previous problem with scallops, but has eaten since. Sulfa (Sulfonamide Antibiotics)  03/06/2010    Hives Current Immunizations  Reviewed on 10/8/2012 Name Date  
 dT Vaccine 10/25/2000 Not reviewed this visit You Were Diagnosed With   
  
 Codes Comments Peripheral edema    -  Primary ICD-10-CM: R60.9 ICD-9-CM: 782.3 Uncontrolled type 2 diabetes mellitus without complication, with long-term current use of insulin (HCC)     ICD-10-CM: E11.65, Z79.4 ICD-9-CM: 250.02, V58.67 Essential hypertension     ICD-10-CM: I10 
ICD-9-CM: 401.9 Encounter for immunization     ICD-10-CM: D81 ICD-9-CM: V03.89 Vitals BP Pulse Temp Resp Height(growth percentile) Weight(growth percentile) 151/80 (BP 1 Location: Left arm, BP Patient Position: Sitting) 96 98.2 °F (36.8 °C) (Oral) 12 5' 6\" (1.676 m) 219 lb 12.8 oz (99.7 kg) LMP SpO2 BMI OB Status Smoking Status 09/23/2009 99% 35.48 kg/m2 Postmenopausal Never Smoker Vitals History BMI and BSA Data Body Mass Index Body Surface Area  
 35.48 kg/m 2 2.15 m 2 Preferred Pharmacy Pharmacy Name Phone [facility] [address] [phone] Your Updated Medication List  
  
   
This list is accurate as of: 5/3/17  2:52 PM.  Always use your most recent med list.  
  
  
  
  
 albuterol 90 mcg/actuation inhaler Commonly known as:  PROVENTIL HFA, VENTOLIN HFA, PROAIR HFA Take 1-2 Puffs by inhalation every four (4) hours as needed for Wheezing. atorvastatin 40 mg tablet Commonly known as:  LIPITOR Take 1 Tab by mouth daily. For cholesterol BD INSULIN PEN NEEDLE UF MINI 31 gauge x 3/16\" Ndle Generic drug:  Insulin Needles (Disposable)  
use four times a day for INSULIN INJECTIONS as directed  
  
 glimepiride 4 mg tablet Commonly known as:  AMARYL  
take 1 tablet every morning  
  
 halobetasol 0.05 % ointment Commonly known as:  ULTRAVATE  
  
 hydroCHLOROthiazide 25 mg tablet Commonly known as:  HYDRODIURIL Take 1 Tab by mouth daily. For blood pressure and swelling  
  
 latanoprost 0.005 % ophthalmic solution Commonly known as:  XALATAN  
PLACE 1 DROP IN BOTH EYES EVERY EVENING  
  
 LEVEMIR FLEXPEN 100 unit/mL (3 mL) Inpn Generic drug:  insulin detemir 55 Units by SubCUTAneous route nightly. metFORMIN 500 mg tablet Commonly known as:  GLUCOPHAGE Take 1,000 mg by mouth two (2) times daily (with meals). NovoLOG Flexpen 100 unit/mL Inpn Generic drug:  insulin aspart 12 Units by SubCUTAneous route Before breakfast, lunch, and dinner. ONETOUCH ULTRA TEST strip Generic drug:  glucose blood VI test strips pneumococcal 13 eric conj dip 0.5 mL Syrg injection Commonly known as:  PREVNAR-13  
0.5 mL by IntraMUSCular route once for 1 dose. TRESIBA FLEXTOUCH U-200 200 unit/mL (3 mL) Inpn Generic drug:  insulin degludec  
60 units at bedtime  
  
 triamcinolone acetonide 0.1 % topical cream  
Commonly known as:  KENALOG Apply  to affected area two (2) times a day. use thin layer  
  
 valsartan 80 mg tablet Commonly known as:  DIOVAN Take 1 Tab by mouth daily. Prescriptions Printed Refills  
 pneumococcal 13 eric conj dip (PREVNAR-13) 0.5 mL syrg injection 0 Si.5 mL by IntraMUSCular route once for 1 dose. Class: Print Route: IntraMUSCular Prescriptions Sent to Pharmacy Refills  
 hydroCHLOROthiazide (HYDRODIURIL) 25 mg tablet 5 Sig: Take 1 Tab by mouth daily. For blood pressure and swelling Class: Normal  
 [facility]  #: [phone] Route: Oral  
  
We Performed the Following  DIABETES FOOT EXAM [[facility] Custom] Follow-up Instructions Return in about 4 weeks (around 2017) for blood pressure. Patient Instructions Leg and Ankle Edema: Care Instructions Your Care Instructions Swelling in the legs, ankles, and feet is called edema. It is common after you sit or stand for a while. Long plane flights or car rides often cause swelling in the legs and feet. You may also have swelling if you have to stand for long periods of time at your job. Problems with the veins in the legs (varicose veins) and changes in hormones can also cause swelling. Sometimes the swelling in the ankles and feet is caused by a more serious problem, such as heart failure, infection, blood clots, or liver or kidney disease. Follow-up care is a key part of your treatment and safety.  Be sure to make and go to all appointments, and call your doctor if you are having problems. Its also a good idea to know your test results and keep a list of the medicines you take. How can you care for yourself at home? · If your doctor gave you medicine, take it as prescribed. Call your doctor if you think you are having a problem with your medicine. · Whenever you are resting, raise your legs up. Try to keep the swollen area higher than the level of your heart. · Take breaks from standing or sitting in one position. ¨ Walk around to increase the blood flow in your lower legs. ¨ Move your feet and ankles often while you stand, or tighten and relax your leg muscles. · Wear support stockings. Put them on in the morning, before swelling gets worse. · Eat a balanced diet. Lose weight if you need to. · Limit the amount of salt (sodium) in your diet. Salt holds fluid in the body and may increase swelling. When should you call for help? Call 911 anytime you think you may need emergency care. For example, call if: 
· You have symptoms of a blood clot in your lung (called a pulmonary embolism). These may include: 
¨ Sudden chest pain. ¨ Trouble breathing. ¨ Coughing up blood. Call your doctor now or seek immediate medical care if: 
· You have signs of a blood clot, such as: 
¨ Pain in your calf, back of the knee, thigh, or groin. ¨ Redness and swelling in your leg or groin. · You have symptoms of infection, such as: 
¨ Increased pain, swelling, warmth, or redness. ¨ Red streaks or pus. ¨ A fever. Watch closely for changes in your health, and be sure to contact your doctor if: 
· Your swelling is getting worse. · You have new or worsening pain in your legs. · You do not get better as expected. Where can you learn more? Go to http://vignesh-nini.info/. Enter P985 in the search box to learn more about \"Leg and Ankle Edema: Care Instructions. \" Current as of: May 27, 2016 Content Version: 11.2 © 9861-8513 Healthwise, Incorporated. Care instructions adapted under license by [employer] acquired by [employer] (which disclaims liability or warranty for this information). If you have questions about a medical condition or this instruction, always ask your healthcare professional. [address] any warranty or liability for your use of this information. Introducing [facility]! Mark Leavitt introduces [employer] patient portal. Now you can access parts of your medical record, email your doctor's office, and request medication refills online. 1. In your internet browser, go to [email or web address] 2. Click on the First Time User? Click Here link in the Sign In box. You will see the New Member Sign Up page. 3. Enter your [employer] Access Code exactly as it appears below. You will not need to use this code after youve completed the sign-up process. If you do not sign up before the expiration date, you must request a new code. · [employer] Access Code: [email or web address] Expires: 5/14/2017 12:37 PM 
 
4. Enter the last four digits of your Social Security Number (xxxx) and Date of Birth (mm/dd/yyyy) as indicated and click Submit. You will be taken to the next sign-up page. 5. Create a [employer] ID. This will be your [employer] login ID and cannot be changed, so think of one that is secure and easy to remember. 6. Create a [employer] password. You can change your password at any time. 7. Enter your Password Reset Question and Answer. This can be used at a later time if you forget your password. 8. Enter your e-mail address. You will receive e-mail notification when new information is available in [address] 9. Click Sign Up. You can now view and download portions of your medical record. 10. Click the Download Summary menu link to download a portable copy of your medical information.  
 
If you have questions, please visit the Frequently Asked Questions section of the [employer]. Remember, MyChart is NOT to be used for urgent needs. For medical emergencies, dial 911. Now available from your iPhone and Android! Please provide this summary of care documentation to your next provider. Your primary care clinician is listed as [address] [place] [address], [facility]/s . If you have any questions after today's visit, please call [phone].

## 2017-05-03 NOTE — PROGRESS NOTES
1. Have you been to the ER, urgent care clinic since your last visit? Hospitalized since your last visit? No    2. Have you seen or consulted any other health care providers outside of the [address] [place] [address] since your last visit? Include any pap smears or colon screening.  No     Chief Complaint   Patient presents with    Foot Swelling    Numbness     fingertips get numb after holding something for a few minutes when she stops the sensatiopn  goes away- off and on for the last 2-3 months

## 2017-05-31 ENCOUNTER — OFFICE VISIT (OUTPATIENT)
Dept: FAMILY MEDICINE CLINIC | Age: 67
End: 2017-05-31

## 2017-05-31 VITALS
HEART RATE: 92 BPM | DIASTOLIC BLOOD PRESSURE: 74 MMHG | BODY MASS INDEX: 35.45 KG/M2 | RESPIRATION RATE: 12 BRPM | SYSTOLIC BLOOD PRESSURE: 138 MMHG | OXYGEN SATURATION: 98 % | TEMPERATURE: 98.1 F | WEIGHT: 220.6 LBS | HEIGHT: 66 IN

## 2017-05-31 DIAGNOSIS — J45.20 MILD INTERMITTENT ASTHMA WITHOUT COMPLICATION: ICD-10-CM
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
## 2017-05-31 DIAGNOSIS — M25.512 ACUTE PAIN OF LEFT SHOULDER: ICD-10-CM

## 2017-05-31 DIAGNOSIS — I10 ESSENTIAL HYPERTENSION: Primary | ICD-10-CM

## 2017-05-31 RX ORDER — ALBUTEROL SULFATE 90 UG/1
1-2 AEROSOL, METERED RESPIRATORY (INHALATION)
Qty: 1 INHALER | Refills: 5 | Status: SHIPPED | OUTPATIENT
Start: 2017-05-31 | End: 2018-02-26

## 2017-05-31 NOTE — MR AVS SNAPSHOT
Visit Information Date & Time Provider Department Dept. Phone Encounter #  
 5/31/2017  2:30 PM Veronika Varghese NP [facility] [phone] [id number] Follow-up Instructions Return in about 4 months (around 9/30/2017) for blood pressure. Upcoming Health Maintenance Date Due Pneumococcal 65+ High/Highest Risk (2 of 2 - PPSV23) 6/28/2017 INFLUENZA AGE 9 TO ADULT 8/1/2017 HEMOGLOBIN A1C Q6M 8/13/2017 MICROALBUMIN Q1 2/13/2018 LIPID PANEL Q1 2/13/2018 MEDICARE YEARLY EXAM 2/14/2018 EYE EXAM RETINAL OR DILATED Q1 4/27/2018 FOOT EXAM Q1 5/3/2018 BREAST CANCER SCRN MAMMOGRAM 2/27/2019 GLAUCOMA SCREENING Q2Y 4/27/2019 COLONOSCOPY 2/11/2023 DTaP/Tdap/Td series (2 - Td) 2/13/2027 Allergies as of 5/31/2017  Review Complete On: 5/31/2017 By: Veronika Varghese NP Severity Noted Reaction Type Reactions Aspirin  03/06/2010    Nausea and Vomiting Can take a coated aspirin. Ciprocinonide  03/06/2010    Swelling Crestor [Rosuvastatin]  03/06/2010    Myalgia Seafood [Shellfish Containing Products]  03/06/2010    Swelling Pt states she does not have a problem with shellfish. Had previous problem with scallops, but has eaten since. Sulfa (Sulfonamide Antibiotics)  03/06/2010    Hives Current Immunizations  Reviewed on 10/8/2012 Name Date Pneumococcal Conjugate (PCV-13) 5/3/2017 dT Vaccine 10/25/2000 Not reviewed this visit You Were Diagnosed With   
  
 Codes Comments Essential hypertension    -  Primary ICD-10-CM: I10 
ICD-9-CM: 401.9 Acute pain of left shoulder     ICD-10-CM: M25.512 ICD-9-CM: 719.41 Fall, initial encounter     ICD-10-CM: W19. Katie Lighter ICD-9-CM: E888.9 Uncontrolled type 2 diabetes mellitus without complication, with long-term current use of insulin (HCC)     ICD-10-CM: E11.65, Z79.4 ICD-9-CM: 250.02, V58.67   
 Mild intermittent asthma without complication     HYK-68-UR: J45.20 ICD-9-CM: 493.90 Vitals BP Pulse Temp Resp Height(growth percentile) Weight(growth percentile) 138/74 (BP 1 Location: Right arm, BP Patient Position: Sitting) 92 98.1 °F (36.7 °C) (Oral) 12 5' 6\" (1.676 m) 220 lb 9.6 oz (100.1 kg) LMP SpO2 BMI OB Status Smoking Status 09/23/2009 98% 35.61 kg/m2 Postmenopausal Never Smoker Vitals History BMI and BSA Data Body Mass Index Body Surface Area  
 35.61 kg/m 2 2.16 m 2 Preferred Pharmacy Pharmacy Name Phone [facility] [address] [phone] Your Updated Medication List  
  
   
This list is accurate as of: 5/31/17  3:16 PM.  Always use your most recent med list.  
  
  
  
  
 albuterol 90 mcg/actuation inhaler Commonly known as:  PROVENTIL HFA, VENTOLIN HFA, PROAIR HFA Take 1-2 Puffs by inhalation every four (4) hours as needed for Wheezing. atorvastatin 40 mg tablet Commonly known as:  LIPITOR Take 1 Tab by mouth daily. For cholesterol BD INSULIN PEN NEEDLE UF MINI 31 gauge x 3/16\" Ndle Generic drug:  Insulin Needles (Disposable)  
use four times a day for INSULIN INJECTIONS as directed  
  
 glimepiride 4 mg tablet Commonly known as:  AMARYL  
take 1 tablet every morning  
  
 halobetasol 0.05 % ointment Commonly known as:  ULTRAVATE  
  
 hydroCHLOROthiazide 25 mg tablet Commonly known as:  HYDRODIURIL Take 1 Tab by mouth daily. For blood pressure and swelling  
  
 latanoprost 0.005 % ophthalmic solution Commonly known as:  XALATAN  
PLACE 1 DROP IN BOTH EYES EVERY EVENING  
  
 metFORMIN 500 mg tablet Commonly known as:  GLUCOPHAGE Take 1,000 mg by mouth two (2) times daily (with meals). NovoLOG Flexpen 100 unit/mL Inpn Generic drug:  insulin aspart 12 Units by SubCUTAneous route Before breakfast, lunch, and dinner. ONETOUCH ULTRA TEST strip Generic drug:  glucose blood VI test strips TRESIBA FLEXTOUCH U-200 200 unit/mL (3 mL) Inpn Generic drug:  insulin degludec  
60 units at bedtime  
  
 triamcinolone acetonide 0.1 % topical cream  
Commonly known as:  KENALOG Apply  to affected area two (2) times a day. use thin layer  
  
 valsartan 80 mg tablet Commonly known as:  DIOVAN Take 1 Tab by mouth daily. Prescriptions Sent to Pharmacy Refills  
 albuterol (PROVENTIL HFA, VENTOLIN HFA, PROAIR HFA) 90 mcg/actuation inhaler 5 Sig: Take 1-2 Puffs by inhalation every four (4) hours as needed for Wheezing. Class: Normal  
 [facility]acy: [facility]  #: [phone] Route: Inhalation Follow-up Instructions Return in about 4 months (around 9/30/2017) for blood pressure. Patient Instructions Shoulder Pain: Care Instructions Your Care Instructions You can hurt your shoulder by using it too much during an activity, such as fishing or baseball. It can also happen as part of the everyday wear and tear of getting older. Shoulder injuries can be slow to heal, but your shoulder should get better with time. Your doctor may recommend a sling to rest your shoulder. If you have injured your shoulder, you may need testing and treatment. Follow-up care is a key part of your treatment and safety. Be sure to make and go to all appointments, and call your doctor if you are having problems. It's also a good idea to know your test results and keep a list of the medicines you take. How can you care for yourself at home? · Take pain medicines exactly as directed. ¨ If the doctor gave you a prescription medicine for pain, take it as prescribed. ¨ If you are not taking a prescription pain medicine, ask your doctor if you can take an over-the-counter medicine.  
¨ Do not take two or more pain medicines at the same time unless the doctor told you to. Many pain medicines contain acetaminophen, which is Tylenol. Too much acetaminophen (Tylenol) can be harmful. · If your doctor recommends that you wear a sling, use it as directed. Do not take it off before your doctor tells you to. · Put ice or a cold pack on the sore area for 10 to 20 minutes at a time. Put a thin cloth between the ice and your skin. · If there is no swelling, you can put moist heat, a heating pad, or a warm cloth on your shoulder. Some doctors suggest alternating between hot and cold. · Rest your shoulder for a few days. If your doctor recommends it, you can then begin gentle exercise of the shoulder, but do not lift anything heavy. When should you call for help? Call 911 anytime you think you may need emergency care. For example, call if: 
· You have chest pain or pressure. This may occur with: ¨ Sweating. ¨ Shortness of breath. ¨ Nausea or vomiting. ¨ Pain that spreads from the chest to the neck, jaw, or one or both shoulders or arms. ¨ Dizziness or lightheadedness. ¨ A fast or uneven pulse. After calling 911, chew 1 adult-strength aspirin. Wait for an ambulance. Do not try to drive yourself. · Your arm or hand is cool or pale or changes color. Call your doctor now or seek immediate medical care if: 
· You have signs of infection, such as: 
¨ Increased pain, swelling, warmth, or redness in your shoulder. ¨ Red streaks leading from a place on your shoulder. ¨ Pus draining from an area of your shoulder. ¨ Swollen lymph nodes in your neck, armpits, or groin. ¨ A fever. Watch closely for changes in your health, and be sure to contact your doctor if: 
· You cannot use your shoulder. · Your shoulder does not get better as expected. Where can you learn more? Go to http://vignesh-nini.info/. Enter C940 in the search box to learn more about \"Shoulder Pain: Care Instructions. \" Current as of: May 23, 2016 Content Version: 11.2 © 9543-5957 Healthwise, Incorporated. Care instructions adapted under license by [employer] (which disclaims liability or warranty for this information). If you have questions about a medical condition or this instruction, always ask your healthcare professional. [address] any warranty or liability for your use of this information. Introducing [facility]! Daisha Moraverly introduces [employer] patient portal. Now you can access parts of your medical record, email your doctor's office, and request medication refills online. 1. In your internet browser, go to [email or web address] 2. Click on the First Time User? Click Here link in the Sign In box. You will see the New Member Sign Up page. 3. Enter your [employer] Access Code exactly as it appears below. You will not need to use this code after youve completed the sign-up process. If you do not sign up before the expiration date, you must request a new code. · [employer] Access Code: [email or web address] Expires: 8/29/2017  3:16 PM 
 
4. Enter the last four digits of your Social Security Number (xxxx) and Date of Birth (mm/dd/yyyy) as indicated and click Submit. You will be taken to the next sign-up page. 5. Create a [employer] ID. This will be your [employer] login ID and cannot be changed, so think of one that is secure and easy to remember. 6. Create a [employer] password. You can change your password at any time. 7. Enter your Password Reset Question and Answer. This can be used at a later time if you forget your password. 8. Enter your e-mail address. You will receive e-mail notification when new information is available in [address] 9. Click Sign Up. You can now view and download portions of your medical record. 10. Click the Download Summary menu link to download a portable copy of your medical information.  
 
If you have questions, please visit the Frequently Asked Questions section of the [employer]. Remember, Metaresolver[employer] is NOT to be used for urgent needs. For medical emergencies, dial 911. Now available from your iPhone and Android! Please provide this summary of care documentation to your next provider. Your primary care clinician is listed as [address] [place] [address], [facility]/s . If you have any questions after today's visit, please call [phone].

## 2017-05-31 NOTE — PATIENT INSTRUCTIONS
Shoulder Pain: Care Instructions  Your Care Instructions    You can hurt your shoulder by using it too much during an activity, such as fishing or baseball. It can also happen as part of the everyday wear and tear of getting older. Shoulder injuries can be slow to heal, but your shoulder should get better with time. Your doctor may recommend a sling to rest your shoulder. If you have injured your shoulder, you may need testing and treatment. Follow-up care is a key part of your treatment and safety. Be sure to make and go to all appointments, and call your doctor if you are having problems. It's also a good idea to know your test results and keep a list of the medicines you take. How can you care for yourself at home? · Take pain medicines exactly as directed. ¨ If the doctor gave you a prescription medicine for pain, take it as prescribed. ¨ If you are not taking a prescription pain medicine, ask your doctor if you can take an over-the-counter medicine. ¨ Do not take two or more pain medicines at the same time unless the doctor told you to. Many pain medicines contain acetaminophen, which is Tylenol. Too much acetaminophen (Tylenol) can be harmful. · If your doctor recommends that you wear a sling, use it as directed. Do not take it off before your doctor tells you to. · Put ice or a cold pack on the sore area for 10 to 20 minutes at a time. Put a thin cloth between the ice and your skin. · If there is no swelling, you can put moist heat, a heating pad, or a warm cloth on your shoulder. Some doctors suggest alternating between hot and cold. · Rest your shoulder for a few days. If your doctor recommends it, you can then begin gentle exercise of the shoulder, but do not lift anything heavy. When should you call for help? Call 911 anytime you think you may need emergency care. For example, call if:  · You have chest pain or pressure. This may occur with:  ¨ Sweating. ¨ Shortness of breath.   ¨ Nausea or vomiting. ¨ Pain that spreads from the chest to the neck, jaw, or one or both shoulders or arms. ¨ Dizziness or lightheadedness. ¨ A fast or uneven pulse. After calling 911, chew 1 adult-strength aspirin. Wait for an ambulance. Do not try to drive yourself. · Your arm or hand is cool or pale or changes color. Call your doctor now or seek immediate medical care if:  · You have signs of infection, such as:  ¨ Increased pain, swelling, warmth, or redness in your shoulder. ¨ Red streaks leading from a place on your shoulder. ¨ Pus draining from an area of your shoulder. ¨ Swollen lymph nodes in your neck, armpits, or groin. ¨ A fever. Watch closely for changes in your health, and be sure to contact your doctor if:  · You cannot use your shoulder. · Your shoulder does not get better as expected. Where can you learn more? Go to http://vignesh-nini.info/. Enter F959 in the search box to learn more about \"Shoulder Pain: Care Instructions. \"  Current as of: May 23, 2016  Content Version: 11.2  © 2310-8923 [employer]. Care instructions adapted under license by [employer] (which disclaims liability or warranty for this information). If you have questions about a medical condition or this instruction, always ask your healthcare professional. [address] any warranty or liability for your use of this information.

## 2017-05-31 NOTE — PROGRESS NOTES
Dee Herbert is a 73 y.o. female who was seen in clinic today (6/1/2017). Subjective:  Cardiovascular Review:  The patient has diabetes, hypertension and hyperlipidemia. Patient has sleep apnea but is not using her CPAP consistently. Diet and Lifestyle: not attempting to follow a low fat, low cholesterol diet, not attempting to follow a low sodium diet, does not rigorously follow a diabetic diet, exercises sporadically, nonsmoker  Home BP Monitoring: is not measured at home. Pertinent ROS: Taking medications regularly as instructed, no TIA's, no chest pain on exertion, no dyspnea on exertion, no swelling of ankles. Fall  Patient is here for evaluation after a fall. Patient reportedly was in bed when she fell from 2 ft. The accident occurred 3 days ago. It is reported that the patient did not have LOC. Patient reports landing on her left shoulder. No home treatments. Prior to Admission medications    Medication Sig Start Date End Date Taking? Authorizing Provider   albuterol (PROVENTIL HFA, VENTOLIN HFA, PROAIR HFA) 90 mcg/actuation inhaler Take 1-2 Puffs by inhalation every four (4) hours as needed for Wheezing. 5/31/17  Yes Lorraine Forman NP   TRESIBA FLEXTOUCH U-200 200 unit/mL (3 mL) inpn 60 units at bedtime 4/26/17  Yes Historical Provider   latanoprost (XALATAN) 0.005 % ophthalmic solution PLACE 1 DROP IN BOTH EYES EVERY EVENING 4/27/17  Yes Historical Provider   valsartan (DIOVAN) 80 mg tablet Take 1 Tab by mouth daily. 5/3/17  Yes Lorraine Forman NP   hydroCHLOROthiazide (HYDRODIURIL) 25 mg tablet Take 1 Tab by mouth daily. For blood pressure and swelling 5/3/17  Yes Lorraine Forman NP   atorvastatin (LIPITOR) 40 mg tablet Take 1 Tab by mouth daily.  For cholesterol 4/28/17  Yes Lorraine Forman NP   BD INSULIN PEN NEEDLE UF MINI 31 gauge x 3/16\" ndle use four times a day for INSULIN INJECTIONS as directed 11/21/16  Yes Historical Provider   triamcinolone acetonide (KENALOG) 0.1 % topical cream Apply  to affected area two (2) times a day. use thin layer 2/13/17  Yes Olga Chawla NP   insulin aspart (NOVOLOG FLEXPEN) 100 unit/mL inpn 12 Units by SubCUTAneous route Before breakfast, lunch, and dinner. 2/13/17  Yes Olga Chawla NP   ONETOUCH ULTRA TEST strip  10/21/15  Yes Historical Provider   halobetasol (ULTRAVATE) 0.05 % ointment  8/14/15  Yes Historical Provider   glimepiride (AMARYL) 4 mg tablet take 1 tablet every morning 5/11/13  Yes Cielo Deal MD   metFORMIN (GLUCOPHAGE) 500 mg tablet Take 1,000 mg by mouth two (2) times daily (with meals). Yes Historical Provider          Allergies   Allergen Reactions    Aspirin Nausea and Vomiting     Can take a coated aspirin.  Ciprocinonide Swelling    Crestor [Rosuvastatin] Myalgia    Seafood [Shellfish Containing Products] Swelling     Pt states she does not have a problem with shellfish. Had previous problem with scallops, but has eaten since.  Sulfa (Sulfonamide Antibiotics) Hives         ROS  See HPI    Objective:   Physical Exam   Constitutional: She is oriented to person, place, and time. She appears well-developed and well-nourished. Neck: Normal range of motion. Neck supple. No JVD present. Carotid bruit is not present. No thyromegaly present. Cardiovascular: Normal rate, regular rhythm and intact distal pulses. Exam reveals no gallop and no friction rub. No murmur heard. Pulmonary/Chest: Effort normal and breath sounds normal. No respiratory distress. Musculoskeletal: She exhibits no edema. Left shoulder: She exhibits tenderness. She exhibits normal range of motion, no bony tenderness, no deformity, no spasm and normal strength. Lymphadenopathy:     She has no cervical adenopathy. Neurological: She is alert and oriented to person, place, and time. Psychiatric: She has a normal mood and affect. Her behavior is normal.   Nursing note and vitals reviewed.         Visit Vitals    /74 (BP 1 Location: Right arm, BP Patient Position: Sitting)    Pulse 92    Temp 98.1 °F (36.7 °C) (Oral)    Resp 12    Ht 5' 6\" (1.676 m)    Wt 220 lb 9.6 oz (100.1 kg)    LMP 09/23/2009    SpO2 98%    BMI 35.61 kg/m2       Assessment & Plan:  Kaz Best was seen today for fall and shoulder pain. Diagnoses and all orders for this visit:    Essential hypertension  Stable, no changes to current therapy    Acute pain of left shoulder  Likely a strain. X-ray deferred at present. Referral to orthopedics for continued symptoms. Fall, initial encounter  As above    Uncontrolled type 2 diabetes mellitus without complication, with long-term current use of insulin ([address]  Managed by endocrinology. Reviewed diet and lifestyle changes. Mild intermittent asthma without complication  -     Refill albuterol (PROVENTIL HFA, VENTOLIN HFA, PROAIR HFA) 90 mcg/actuation inhaler; Take 1-2 Puffs by inhalation every four (4) hours as needed for Wheezing. I have discussed the diagnosis with the patient and the intended plan as seen in the above orders. The patient has received an after-visit summary along with patient information handout. I have discussed medication side effects and warnings with the patient as well. Follow-up Disposition:  Return in about 4 months (around 9/30/2017) for blood pressure.         Ciara Kendrick NP

## 2017-05-31 NOTE — PROGRESS NOTES
1. Have you been to the ER, urgent care clinic since your last visit? Hospitalized since your last visit? No    2. Have you seen or consulted any other health care providers outside of the [address] [place] [address] since your last visit? Include any pap smears or colon screening.  No     Chief Complaint   Patient presents with   [facility] Fall     fell while getting out of bed and injured left shoulder 5/28/17    Shoulder Pain     arm got stuck on the side of the bed while falling

## 2017-07-11 ENCOUNTER — TELEPHONE (OUTPATIENT)
Dept: FAMILY MEDICINE CLINIC | Age: 67
End: 2017-07-11

## 2017-07-11 NOTE — TELEPHONE ENCOUNTER
Patient with back pain . Has appt 07/14/17 with back dr.and they need MRI before appt.  Appointment schedule with Dr Champ Seymour for eval

## 2017-07-11 NOTE — TELEPHONE ENCOUNTER
----- Message from Luisito Flores sent at 7/10/2017  5:34 PM EDT -----  Regarding: Dr. Jose Manuel Ford / telephone  Pt requesting and appointment for and Open MRI of the back To be scheduled. Pt has and appointment with her back Dr on the 15th and needs and appointment for and MRI before that appointment. Pt best contact number is [phone].

## 2017-07-12 ENCOUNTER — OFFICE VISIT (OUTPATIENT)
Dept: FAMILY MEDICINE CLINIC | Age: 67
End: 2017-07-12

## 2017-07-12 VITALS
TEMPERATURE: 98.9 F | DIASTOLIC BLOOD PRESSURE: 78 MMHG | BODY MASS INDEX: 35.39 KG/M2 | RESPIRATION RATE: 18 BRPM | HEIGHT: 66 IN | HEART RATE: 101 BPM | WEIGHT: 220.2 LBS | OXYGEN SATURATION: 98 % | SYSTOLIC BLOOD PRESSURE: 122 MMHG

## 2017-07-12 DIAGNOSIS — M54.50 RIGHT-SIDED LOW BACK PAIN WITHOUT SCIATICA, UNSPECIFIED CHRONICITY: Primary | ICD-10-CM

## 2017-07-12 NOTE — MR AVS SNAPSHOT
Visit Information Date & Time Provider Department Dept. Phone Encounter #  
 7/12/2017  3:30 PM Satya Carr  [facility] [phone] [id number] Upcoming Health Maintenance Date Due Pneumococcal 65+ High/Highest Risk (2 of 2 - PPSV23) 6/28/2017 INFLUENZA AGE 9 TO ADULT 8/1/2017 HEMOGLOBIN A1C Q6M 8/13/2017 MICROALBUMIN Q1 2/13/2018 LIPID PANEL Q1 2/13/2018 MEDICARE YEARLY EXAM 2/14/2018 EYE EXAM RETINAL OR DILATED Q1 4/27/2018 FOOT EXAM Q1 5/3/2018 BREAST CANCER SCRN MAMMOGRAM 2/27/2019 GLAUCOMA SCREENING Q2Y 4/27/2019 COLONOSCOPY 2/11/2023 DTaP/Tdap/Td series (2 - Td) 2/13/2027 Allergies as of 7/12/2017  Review Complete On: 7/12/2017 By: Quinten Matias LPN Severity Noted Reaction Type Reactions Aspirin  03/06/2010    Nausea and Vomiting Can take a coated aspirin. Ciprocinonide  03/06/2010    Swelling Crestor [Rosuvastatin]  03/06/2010    Myalgia Seafood [Shellfish Containing Products]  03/06/2010    Swelling Pt states she does not have a problem with shellfish. Had previous problem with scallops, but has eaten since. Sulfa (Sulfonamide Antibiotics)  03/06/2010    Hives Current Immunizations  Reviewed on 10/8/2012 Name Date Pneumococcal Conjugate (PCV-13) 5/3/2017 dT Vaccine 10/25/2000 Not reviewed this visit You Were Diagnosed With   
  
 Codes Comments Right-sided low back pain without sciatica, unspecified chronicity    -  Primary ICD-10-CM: M54.5 ICD-9-CM: 724.2 Vitals BP Pulse Temp Resp Height(growth percentile) Weight(growth percentile) 122/78 (BP 1 Location: Right arm, BP Patient Position: Sitting) (!) 101 98.9 °F (37.2 °C) (Oral) 18 5' 6\" (1.676 m) 220 lb 3.2 oz (99.9 kg) LMP SpO2 BMI OB Status Smoking Status 09/23/2009 98% 35.54 kg/m2 Postmenopausal Never Smoker BMI and BSA Data Body Mass Index Body Surface Area  35.54 kg/m 2 2.16 m 2  
 Preferred Pharmacy [facility] Name Phone [facility] [address] [facility] [phone] Your Updated Medication List  
  
   
This list is accurate as of: 7/12/17  4:19 PM.  Always use your most recent med list.  
  
  
  
  
 albuterol 90 mcg/actuation inhaler Commonly known as:  PROVENTIL HFA, VENTOLIN HFA, PROAIR HFA Take 1-2 Puffs by inhalation every four (4) hours as needed for Wheezing. atorvastatin 40 mg tablet Commonly known as:  LIPITOR Take 1 Tab by mouth daily. For cholesterol BD INSULIN PEN NEEDLE UF MINI 31 gauge x 3/16\" Ndle Generic drug:  Insulin Needles (Disposable)  
use four times a day for INSULIN INJECTIONS as directed  
  
 glimepiride 4 mg tablet Commonly known as:  AMARYL  
take 1 tablet every morning  
  
 halobetasol 0.05 % ointment Commonly known as:  ULTRAVATE  
  
 hydroCHLOROthiazide 25 mg tablet Commonly known as:  HYDRODIURIL Take 1 Tab by mouth daily. For blood pressure and swelling  
  
 latanoprost 0.005 % ophthalmic solution Commonly known as:  XALATAN  
PLACE 1 DROP IN BOTH EYES EVERY EVENING  
  
 metFORMIN 500 mg tablet Commonly known as:  GLUCOPHAGE Take 1,000 mg by mouth two (2) times daily (with meals). NovoLOG Flexpen 100 unit/mL Inpn Generic drug:  insulin aspart 12 Units by SubCUTAneous route Before breakfast, lunch, and dinner. ONETOUCH ULTRA TEST strip Generic drug:  glucose blood VI test strips TRESIBA FLEXTOUCH U-200 200 unit/mL (3 mL) Inpn Generic drug:  insulin degludec  
60 units at bedtime  
  
 triamcinolone acetonide 0.1 % topical cream  
Commonly known as:  KENALOG Apply  to affected area two (2) times a day. use thin layer  
  
 valsartan 80 mg tablet Commonly known as:  DIOVAN Take 1 Tab by mouth daily. To-Do List   
 07/12/2017 Imaging:  XR SPINE LUMB 2 OR 3 V Patient Instructions High Blood Pressure: Care Instructions Your Care Instructions If your blood pressure is usually above 140/90, you have high blood pressure, or hypertension. That means the top number is 140 or higher or the bottom number is 90 or higher, or both. Despite what a lot of people think, high blood pressure usually doesn't cause headaches or make you feel dizzy or lightheaded. It usually has no symptoms. But it does increase your risk for heart attack, stroke, and kidney or eye damage. The higher your blood pressure, the more your risk increases. Your doctor will give you a goal for your blood pressure. Your goal will be based on your health and your age. An example of a goal is to keep your blood pressure below 140/90. Lifestyle changes, such as eating healthy and being active, are always important to help lower blood pressure. You might also take medicine to reach your blood pressure goal. 
Follow-up care is a key part of your treatment and safety. Be sure to make and go to all appointments, and call your doctor if you are having problems. It's also a good idea to know your test results and keep a list of the medicines you take. How can you care for yourself at home? Medical treatment · If you stop taking your medicine, your blood pressure will go back up. You may take one or more types of medicine to lower your blood pressure. Be safe with medicines. Take your medicine exactly as prescribed. Call your doctor if you think you are having a problem with your medicine. · Talk to your doctor before you start taking aspirin every day. Aspirin can help certain people lower their risk of a heart attack or stroke. But taking aspirin isn't right for everyone, because it can cause serious bleeding. · See your doctor regularly. You may need to see the doctor more often at first or until your blood pressure comes down.  
· If you are taking blood pressure medicine, talk to your doctor before you take decongestants or anti-inflammatory medicine, such as ibuprofen. Some of these medicines can raise blood pressure. · Learn how to check your blood pressure at home. Lifestyle changes · Stay at a healthy weight. This is especially important if you put on weight around the waist. Losing even 10 pounds can help you lower your blood pressure. · If your doctor recommends it, get more exercise. Walking is a good choice. Bit by bit, increase the amount you walk every day. Try for at least 30 minutes on most days of the week. You also may want to swim, bike, or do other activities. · Avoid or limit alcohol. Talk to your doctor about whether you can drink any alcohol. · Try to limit how much sodium you eat to less than 2,300 milligrams (mg) a day. Your doctor may ask you to try to eat less than 1,500 mg a day. · Eat plenty of fruits (such as bananas and oranges), vegetables, legumes, whole grains, and low-fat dairy products. · Lower the amount of saturated fat in your diet. Saturated fat is found in animal products such as milk, cheese, and meat. Limiting these foods may help you lose weight and also lower your risk for heart disease. · Do not smoke. Smoking increases your risk for heart attack and stroke. If you need help quitting, talk to your doctor about stop-smoking programs and medicines. These can increase your chances of quitting for good. When should you call for help? Call 911 anytime you think you may need emergency care. This may mean having symptoms that suggest that your blood pressure is causing a serious heart or blood vessel problem. Your blood pressure may be over 180/110. For example, call 911 if: 
· You have symptoms of a heart attack. These may include: ¨ Chest pain or pressure, or a strange feeling in the chest. 
¨ Sweating. ¨ Shortness of breath. ¨ Nausea or vomiting.  
¨ Pain, pressure, or a strange feeling in the back, neck, jaw, or upper belly or in one or both shoulders or arms. ¨ Lightheadedness or sudden weakness. ¨ A fast or irregular heartbeat. · You have symptoms of a stroke. These may include: 
¨ Sudden numbness, tingling, weakness, or loss of movement in your face, arm, or leg, especially on only one side of your body. ¨ Sudden vision changes. ¨ Sudden trouble speaking. ¨ Sudden confusion or trouble understanding simple statements. ¨ Sudden problems with walking or balance. ¨ A sudden, severe headache that is different from past headaches. · You have severe back or belly pain. Do not wait until your blood pressure comes down on its own. Get help right away. Call your doctor now or seek immediate care if: 
· Your blood pressure is much higher than normal (such as 180/110 or higher), but you don't have symptoms. · You think high blood pressure is causing symptoms, such as: ¨ Severe headache. ¨ Blurry vision. Watch closely for changes in your health, and be sure to contact your doctor if: 
· Your blood pressure measures 140/90 or higher at least 2 times. That means the top number is 140 or higher or the bottom number is 90 or higher, or both. · You think you may be having side effects from your blood pressure medicine. · Your blood pressure is usually normal, but it goes above normal at least 2 times. Where can you learn more? Go to http://vignesh-nini.info/. Enter P333 in the search box to learn more about \"High Blood Pressure: Care Instructions. \" Current as of: August 8, 2016 Content Version: 11.3 © 9711-6902 [employer]. Care instructions adapted under license by [employer] (which disclaims liability or warranty for this information). If you have questions about a medical condition or this instruction, always ask your healthcare professional. [place] [address] any warranty or liability for your use of this information. Introducing [facility]! Arsh Ortiz introduces Sonavation patient portal. Now you can access parts of your medical record, email your doctor's office, and request medication refills online. 1. In your internet browser, go to [email or web address] 2. Click on the First Time User? Click Here link in the Sign In box. You will see the New Member Sign Up page. 3. Enter your Sonavation Access Code exactly as it appears below. You will not need to use this code after youve completed the sign-up process. If you do not sign up before the expiration date, you must request a new code. · Sonavation Access Code: [email or web address] Expires: 8/29/2017  3:16 PM 
 
4. Enter the last four digits of your Social Security Number (xxxx) and Date of Birth (mm/dd/yyyy) as indicated and click Submit. You will be taken to the next sign-up page. 5. Create a Sonavation ID. This will be your Sonavation login ID and cannot be changed, so think of one that is secure and easy to remember. 6. Create a Sonavation password. You can change your password at any time. 7. Enter your Password Reset Question and Answer. This can be used at a later time if you forget your password. 8. Enter your e-mail address. You will receive e-mail notification when new information is available in [address] 9. Click Sign Up. You can now view and download portions of your medical record. 10. Click the Download Summary menu link to download a portable copy of your medical information. If you have questions, please visit the Frequently Asked Questions section of the Son[employer]ation website. Remember, Son[employer]ation is NOT to be used for urgent needs. For medical emergencies, dial 911. Now available from your iPhone and Android! Please provide this summary of care documentation to your next provider. Your primary care clinician is listed as [place] [address], [facility]/s  If you have any questions after today's visit, please call [phone].

## 2017-07-12 NOTE — PROGRESS NOTES
Chief Complaint   Patient presents with    Back Pain     Patient needs an MRI for the back doctor.  Hypertension     Blood pressure medicine that currently used is generic and it makes the patient cough. 1. Have you been to the ER, urgent care clinic since your last visit? Hospitalized since your last visit? No    2. Have you seen or consulted any other health care providers outside of the [address] [place] [address] since your last visit? Include any pap smears or colon screening.  No

## 2017-07-12 NOTE — PROGRESS NOTES
HISTORY OF PRESENT ILLNESS  HPI  Jonah Farley is a 73 y.o. Female with history of HTN, DM-II, hyperlipidemia, and vitamin D deficiency who presents to office today for back pain. Pt complains of intermittent worsening lower back pain, especially in her right buttock, since 3/2017 that began gradually. She notes that the pain is aggravated by walking and standing, with walking being the worst. She states that she has had three disc surgeries with neurologist Dr. Treasure Gill, the last one being 10+ years ago. She denies radiation of the pain into her legs. Past Medical History:   Diagnosis Date    Allergic rhinitis     DDD (degenerative disc disease)     thoracic spine    DJD (degenerative joint disease) of cervical spine     Eczema     Hypertension     Lichen sclerosus     dx at [facility]    ANMOL (obstructive sleep apnea)     CPAP    Other and unspecified hyperlipidemia     Reflux esophagitis     Type II or unspecified type diabetes mellitus without mention of complication, not stated as uncontrolled      Past Surgical History:   Procedure Laterality Date    HX BACK SURGERY      x 3    HX COLONOSCOPY  2013    HX GYN      benign tumors removed from uterus    HX HEENT      sinus surgery for snoring    HX HIP REPLACEMENT  2004    rt    HX ORTHOPAEDIC Bilateral July 2013    hip replacement    HX TONSIL AND ADENOIDECTOMY      HX TUBAL LIGATION      PALATOPHAYNGOPLASTY  2002    AK DCMPRN PERQ NUCLEUS PULPOSUS 1/> LEVELS LUMBAR  2003    L3-4     Current Outpatient Prescriptions on File Prior to Visit   Medication Sig Dispense Refill    albuterol (PROVENTIL HFA, VENTOLIN HFA, PROAIR HFA) 90 mcg/actuation inhaler Take 1-2 Puffs by inhalation every four (4) hours as needed for Wheezing.  1 Inhaler 5    TRESIBA FLEXTOUCH U-200 200 unit/mL (3 mL) inpn 60 units at bedtime  0    latanoprost (XALATAN) 0.005 % ophthalmic solution PLACE 1 DROP IN BOTH EYES EVERY EVENING  0    valsartan (DIOVAN) 80 mg tablet Take 1 Tab by mouth daily.  atorvastatin (LIPITOR) 40 mg tablet Take 1 Tab by mouth daily. For cholesterol 30 Tab 5    BD INSULIN PEN NEEDLE UF MINI 31 gauge x 3/16\" ndle use four times a day for INSULIN INJECTIONS as directed  1    triamcinolone acetonide (KENALOG) 0.1 % topical cream Apply  to affected area two (2) times a day. use thin layer 80 g 1    insulin aspart (NOVOLOG FLEXPEN) 100 unit/mL inpn 12 Units by SubCUTAneous route Before breakfast, lunch, and dinner.  ONETOUCH ULTRA TEST strip   0    halobetasol (ULTRAVATE) 0.05 % ointment   0    glimepiride (AMARYL) 4 mg tablet take 1 tablet every morning 30 Tab 0    metFORMIN (GLUCOPHAGE) 500 mg tablet Take 1,000 mg by mouth two (2) times daily (with meals). No current facility-administered medications on file prior to visit. Allergies   Allergen Reactions    Aspirin Nausea and Vomiting     Can take a coated aspirin.  Ciprocinonide Swelling    Crestor [Rosuvastatin] Myalgia    Seafood [Shellfish Containing Products] Swelling     Pt states she does not have a problem with shellfish. Had previous problem with scallops, but has eaten since.     Sulfa (Sulfonamide Antibiotics) Hives     Family History   Problem Relation Age of Onset    Cancer Maternal Grandfather      lung    Cancer Paternal Grandmother     Cancer Paternal Grandfather      colon    Heart Attack Mother     Dementia Father      alzheimers    Dementia Maternal Grandmother      alzheimers    Breast Cancer Paternal Aunt      Social History     Social History    Marital status: SINGLE     Spouse name: N/A    Number of children: N/A    Years of education: N/A     Social History Main Topics    Smoking status: Never Smoker    Smokeless tobacco: Never Used    Alcohol use Yes      Comment: occasional    Drug use: No    Sexual activity: Not Asked     Other Topics Concern     Service No    Blood Transfusions No    Caffeine Concern No    Occupational Exposure No    Hobby Hazards No    Sleep Concern Yes    Stress Concern Yes    Weight Concern Yes    Special Diet No    Back Care Yes     3 back surgeries    Exercise No    Bike Helmet Not Asked     n/a    Seat Belt Yes    Self-Exams Yes     Social History Narrative               Review of Systems   Constitutional: Negative for chills, diaphoresis, fever, malaise/fatigue and weight loss. Eyes: Negative for blurred vision, double vision and pain. Respiratory: Negative for cough, shortness of breath and wheezing. Cardiovascular: Negative for chest pain, palpitations, orthopnea, claudication, leg swelling and PND. Genitourinary: Negative for frequency. Musculoskeletal: Positive for back pain (lower). Skin: Negative for itching and rash. Neurological: Negative for dizziness, tingling, tremors, sensory change, speech change, focal weakness, seizures, loss of consciousness, weakness and headaches. Endo/Heme/Allergies: Negative for environmental allergies and polydipsia. Does not bruise/bleed easily. Results for orders placed or performed in visit on 02/13/17   LIPID PANEL   Result Value Ref Range    Cholesterol, total 189 100 - 199 mg/dL    Triglyceride 121 0 - 149 mg/dL    HDL Cholesterol 61 >39 mg/dL    VLDL, calculated 24 5 - 40 mg/dL    LDL, calculated 104 (H) 0 - 99 mg/dL   METABOLIC PANEL, COMPREHENSIVE   Result Value Ref Range    Glucose 114 (H) 65 - 99 mg/dL    BUN 16 8 - 27 mg/dL    Creatinine 0.96 0.57 - 1.00 mg/dL    GFR est non-AA 62 >59 mL/min/1.73    GFR est AA 72 >59 mL/min/1.73    BUN/Creatinine ratio 17 11 - 26    Sodium 142 134 - 144 mmol/L    Potassium 4.4 3.5 - 5.2 mmol/L    Chloride 98 96 - 106 mmol/L    CO2 25 18 - 29 mmol/L    Calcium 10.2 8.7 - 10.3 mg/dL    Protein, total 7.7 6.0 - 8.5 g/dL    Albumin 4.6 3.6 - 4.8 g/dL    GLOBULIN, TOTAL 3.1 1.5 - 4.5 g/dL    A-G Ratio 1.5 1.1 - 2.5    Bilirubin, total 0.3 0.0 - 1.2 mg/dL    Alk.  phosphatase 167 (H) 39 - 117 IU/L    AST (SGOT) 14 0 - 40 IU/L    ALT (SGPT) 17 0 - 32 IU/L   HEMOGLOBIN A1C WITH EAG   Result Value Ref Range    Hemoglobin A1c 10.1 (H) 4.8 - 5.6 %    Estimated average glucose 243 mg/dL   MICROALBUMIN, UR, RAND W/ MICROALBUMIN/CREA RATIO   Result Value Ref Range    Creatinine, urine 152.4 Not Estab. mg/dL    Microalbumin, urine 54.4 Not Estab. ug/mL    Microalb/Creat ratio (ug/mg creat.) 35.7 (H) 0.0 - 30.0 mg/g creat   CBC W/O DIFF   Result Value Ref Range    WBC 11.2 (H) 3.4 - 10.8 x10E3/uL    RBC 4.78 3.77 - 5.28 x10E6/uL    HGB 11.7 11.1 - 15.9 g/dL    HCT 37.2 34.0 - 46.6 %    MCV 78 (L) 79 - 97 fL    MCH 24.5 (L) 26.6 - 33.0 pg    MCHC 31.5 31.5 - 35.7 g/dL    RDW 15.9 (H) 12.3 - 15.4 %    PLATELET 450 746 - 844 x10E3/uL   CVD REPORT   Result Value Ref Range    INTERPRETATION Note                Physical Exam  Visit Vitals    /78 (BP 1 Location: Right arm, BP Patient Position: Sitting)    Pulse (!) 101    Temp 98.9 °F (37.2 °C) (Oral)    Resp 18    Ht 5' 6\" (1.676 m)    Wt 220 lb 3.2 oz (99.9 kg)    LMP 09/23/2009    SpO2 98%    BMI 35.54 kg/m2     Lungs: clear to auscultation bilaterally  Heart: regular rate and rhythm, S1, S2 normal, no murmur, click, rub or gallop  Back: flexed knee and SLR cause no discomfort in her back, getting up out of the chair causes almost no discomfort, when she first stands and walks it doesn't bother her but as she starts to walk 20 ft or so she starts having back discomfort mostly in the right buttock area, doesn't really go down much into the thigh and definitely not below the mid-thigh area, no appreciable weakness on exam          ASSESSMENT and PLAN    ICD-10-CM ICD-9-CM    1. Right-sided low back pain without sciatica, unspecified chronicity M54.5 724.2 XR SPINE LUMB 2 OR 3 V     Pattie Jean Baptiste was seen today for back pain and hypertension.     Diagnoses and all orders for this visit:    Right-sided low back pain without sciatica, unspecified chronicity  - XR SPINE LUMB 2 OR 3 V; Future      Follow-up Disposition:  Return in about 1 month (around 8/12/2017), or if cough no better,or back pain no better, for F/U low back pain. reviewed medications and side effects in detail  Please call my office if there are any questions- [phone]. Discussed expected course/resolution/complications of diagnosis in detail with patient. Medication risks/benefits/costs/interactions/alternatives discussed with patient. Pt was given an after visit summary which includes diagnoses, current medications & vitals. Pt expressed understanding with the diagnosis and plan. Total 25 minutes,60 % counseling re: Patient to call if no better in 3 -4 days and prn new problems. I told pt it sounds like she has either foraminal or spinal stenosis causing her problem, which is totally different from the surgeries she had years ago. Sometimes arthritis, aging of the spine, or surgical scars can cause this problem, so we'll start off with a regular xray of her back. If it's not helpful, we'll go from there as far as doing an MRI, etc. To see spinal stenosis or foraminal stenosis, she will need to have an MRI. Her insurance company will probably not cover that until we get the regular xray, and she may even be required to do PT or medications before they'll cover it. She said that the Diovan is causing her to cough. She has the generic form and wants to go to the brand name. I told her that the brand name would be fine, but that I wasn't aware that switching from a generic to a brand name would make a difference in the side effects. Chanelle Wilburn doesn't typically cause a lot of coughing anyway, so it may be something else like reflux or allergy causing her cough. She'll try a month of the brand name to see if it works better for her, and if doesn't resolve , she will return for further evaluation; there is no fever or blood or discolored material coming up.  No chest pain or weight loss or any other associated symptoms  Also, discussed symptoms of concern that were noted today in the note above, treatment options( including doing nothing), when to follow up before recommended time frame. Also, answered all questions. This document was written by Shavonne Yousif, as dictated by Eric Tony MD.  I have reviewed and agree with the above note and have made corrections where appropriate Henry Gibson M.D.

## 2017-07-12 NOTE — PATIENT INSTRUCTIONS

## 2017-07-20 ENCOUNTER — PATIENT OUTREACH (OUTPATIENT)
Dept: FAMILY MEDICINE CLINIC | Age: 67
End: 2017-07-20

## 2017-07-20 RX ORDER — DIAZEPAM 5 MG/1
5 TABLET ORAL
COMMUNITY
End: 2018-01-25

## 2017-07-20 RX ORDER — NAPROXEN 500 MG/1
500 TABLET ORAL 2 TIMES DAILY WITH MEALS
COMMUNITY
End: 2018-01-13

## 2017-07-20 NOTE — PROGRESS NOTES
Benny Richardson is a 73 y.o. female   This patient was received as a referral from the [employer]. Low Risk            6       Total Score        6 Charlson Comorbidity Score        Criteria that do not apply:    Relationship with PCP    Patient Living Status    Patient Length of Stay > 5    More than 1 Admission in calendar year    Patient Insurance is Medicare, Medicaid or Self Pay        Summary of patients top three medical problems:     Problem 1: DM2     Problem 2: cervical spondylosis     Problem 3: Degeneration of intervertebral disc    Patient's challenges to self management identified:  None noted at this time. Patients motivational level on a scale of 0-10:8  Medication Management:  Taking meds as ordered. Advance Care Planning:   Patient was offered the opportunity to discuss advance care planning:  no   Does patient have an Advance Directive:  no   If no, did you provide information on Advance Care Planning? no     [employer], Referrals, and Durable Medical Equipment: Referred to neurosurgeBrian mack- graeme 7/21 11am    Follow up appointments:  See above note. Goals      Establish PCP relationships and regularly scheduled appointments.  Reduce ED Utilization           Patient verbalized understanding of all information discussed. Patient has this Nurse Navigators contact information for any further questions, concerns, or needs.

## 2017-08-30 ENCOUNTER — OFFICE VISIT (OUTPATIENT)
Dept: FAMILY MEDICINE CLINIC | Age: 67
End: 2017-08-30

## 2017-08-30 ENCOUNTER — HOSPITAL ENCOUNTER (OUTPATIENT)
Dept: LAB | Age: 67
Discharge: HOME | End: 2017-08-30
Payer: MEDICARE

## 2017-08-30 VITALS
BODY MASS INDEX: 36.19 KG/M2 | DIASTOLIC BLOOD PRESSURE: 67 MMHG | HEART RATE: 91 BPM | RESPIRATION RATE: 18 BRPM | HEIGHT: 66 IN | SYSTOLIC BLOOD PRESSURE: 124 MMHG | OXYGEN SATURATION: 97 % | TEMPERATURE: 98.3 F | WEIGHT: 225.2 LBS

## 2017-08-30 DIAGNOSIS — E78.5 HYPERLIPIDEMIA LDL GOAL <100: ICD-10-CM

## 2017-08-30 DIAGNOSIS — D72.829 LEUKOCYTOSIS, UNSPECIFIED TYPE: ICD-10-CM

## 2017-08-30 PROCEDURE — 85025 COMPLETE CBC W/AUTO DIFF WBC: CPT

## 2017-08-30 PROCEDURE — 83718 ASSAY OF LIPOPROTEIN: CPT

## 2017-08-30 PROCEDURE — 80053 COMPREHEN METABOLIC PANEL: CPT

## 2017-08-30 PROCEDURE — 36415 COLL VENOUS BLD VENIPUNCTURE: CPT

## 2017-08-30 PROCEDURE — 83036 HEMOGLOBIN GLYCOSYLATED A1C: CPT

## 2017-08-30 PROCEDURE — 82465 ASSAY BLD/SERUM CHOLESTEROL: CPT

## 2017-08-30 RX ORDER — FUROSEMIDE 40 MG/1
40 TABLET ORAL DAILY
Qty: 30 TAB | Refills: 1 | Status: SHIPPED | OUTPATIENT
Start: 2017-08-30 | End: 2018-01-25

## 2017-08-30 NOTE — PROGRESS NOTES
\"Reviewed record in preparation for visit and have obtained the necessary documentation\"  Chief Complaint   Patient presents with    Tingling     swelling and pain in lower extremeties bilaterally for several months        1. Have you been to the ER, urgent care clinic since your last visit? Hospitalized since your last visit? No    2. Have you seen or consulted any other health care providers outside of the [address] [place] [address] since your last visit? Include any pap smears or colon screening.  No

## 2017-08-30 NOTE — PROGRESS NOTES
HISTORY OF PRESENT ILLNESS  HPI  Paulie Rocha is a 73 y.o. Female with history of HTN, DM-II, hyperlipidemia, and vitamin D deficiency who presents to office today for feet swelling. Pt complains of bilateral foot swelling for the past 3-4 months. Pt sees endocrinologist Dr. Joel Buerger every three months for her diabetes. Pt was placed on Tresiba 60 units at bedtime on 4/26/17; she is still taking the medication but states that she cannot afford it. Pt also uses Novolog, averaging 12-16 units before her first meal and 14-18 units before her second meal.  Pt states that she last ate at 9 AM, eating a meal of turkey pot pie. Past Medical History:   Diagnosis Date    Allergic rhinitis     DDD (degenerative disc disease)     thoracic spine    DJD (degenerative joint disease) of cervical spine     Eczema     Hypertension     Lichen sclerosus     [facility] at [facility]    ANMOL (obstructive sleep apnea)     CPAP    Other and unspecified hyperlipidemia     Reflux esophagitis     Type II or unspecified type diabetes mellitus without mention of complication, not stated as uncontrolled      Past Surgical History:   Procedure Laterality Date    HX BACK SURGERY      x 3    HX COLONOSCOPY  2013    HX GYN      benign tumors removed from uterus    HX HEENT      sinus surgery for snoring    HX HIP REPLACEMENT  2004    rt    HX ORTHOPAEDIC Bilateral July 2013    hip replacement    HX TONSIL AND ADENOIDECTOMY      HX TUBAL LIGATION      PALATOPHAYNGOPLASTY  2002    [place] DCMPRN PERQ NUCLEUS PULPOSUS 1/> LEVELS LUMBAR  2003    L3-4     Current Outpatient Prescriptions on File Prior to Visit   Medication Sig Dispense Refill    naproxen (NAPROSYN) 500 mg tablet Take 500 mg by mouth two (2) times daily (with meals).  diazePAM (VALIUM) 5 mg tablet Take 5 mg by mouth every eight (8) hours as needed (back pain).       albuterol (PROVENTIL HFA, VENTOLIN HFA, PROAIR HFA) 90 mcg/actuation inhaler Take 1-2 Puffs by inhalation every four (4) hours as needed for Wheezing. 1 Inhaler 5    TRESIBA FLEXTOUCH U-200 200 unit/mL (3 mL) inpn 60 units at bedtime  0    latanoprost (XALATAN) 0.005 % ophthalmic solution PLACE 1 DROP IN BOTH EYES EVERY EVENING  0    valsartan (DIOVAN) 80 mg tablet Take 1 Tab by mouth daily.  atorvastatin (LIPITOR) 40 mg tablet Take 1 Tab by mouth daily. For cholesterol 30 Tab 5    BD INSULIN PEN NEEDLE UF MINI 31 gauge x 3/16\" ndle use four times a day for INSULIN INJECTIONS as directed  1    triamcinolone acetonide (KENALOG) 0.1 % topical cream Apply  to affected area two (2) times a day. use thin layer 80 g 1    insulin aspart (NOVOLOG FLEXPEN) 100 unit/mL inpn 12 Units by SubCUTAneous route Before breakfast, lunch, and dinner.  ONETOUCH ULTRA TEST strip   0    halobetasol (ULTRAVATE) 0.05 % ointment   0    glimepiride (AMARYL) 4 mg tablet take 1 tablet every morning 30 Tab 0    metFORMIN (GLUCOPHAGE) 500 mg tablet Take 1,000 mg by mouth two (2) times daily (with meals). No current facility-administered medications on file prior to visit. Allergies   Allergen Reactions    Aspirin Nausea and Vomiting     Can take a coated aspirin.  Ciprocinonide Swelling    Crestor [Rosuvastatin] Myalgia    Seafood [Shellfish Containing Products] Swelling     Pt states she does not have a problem with shellfish. Had previous problem with scallops, but has eaten since.     Sulfa (Sulfonamide Antibiotics) Hives     Family History   Problem Relation Age of Onset    Cancer Maternal Grandfather      lung    Cancer Paternal Grandmother     Cancer Paternal Grandfather      colon    Heart Attack Mother     Dementia Father      alzheimers    Dementia Maternal Grandmother      alzheimers    Breast Cancer Paternal Aunt      Social History     Social History    Marital status: SINGLE     Spouse name: N/A    Number of children: N/A    Years of education: N/A     Social History Main Topics    Smoking status: Never Smoker    Smokeless tobacco: Never Used    Alcohol use Yes      Comment: occasional    Drug use: No    Sexual activity: Not Asked     Other Topics Concern     Service No    Blood Transfusions No    Caffeine Concern No    Occupational Exposure No    Hobby Hazards No    Sleep Concern Yes    Stress Concern Yes    Weight Concern Yes    Special Diet No    Back Care Yes     3 back surgeries    Exercise No    Bike Helmet Not Asked     n/a    Seat Belt Yes    Self-Exams Yes     Social History Narrative               Review of Systems   Constitutional: Negative for chills, diaphoresis, fever, malaise/fatigue and weight loss. Eyes: Negative for blurred vision, double vision, pain and redness. Respiratory: Negative for cough, shortness of breath and wheezing. Cardiovascular: Positive for leg swelling (bilateral feet). Negative for chest pain, palpitations, orthopnea, claudication and PND. Skin: Negative for itching and rash. Neurological: Negative for dizziness, tingling, tremors, sensory change, speech change, focal weakness, seizures, loss of consciousness, weakness and headaches. Results for orders placed or performed in visit on 08/30/17   HEMOGLOBIN A1C WITH EAG   Result Value Ref Range    Hemoglobin A1c 8.0 (H) 4.8 - 5.6 %    Estimated average glucose 465 mg/dL   METABOLIC PANEL, COMPREHENSIVE   Result Value Ref Range    Glucose 97 65 - 99 mg/dL    BUN 11 8 - 27 mg/dL    Creatinine 0.97 0.57 - 1.00 mg/dL    GFR est non-AA 61 >59 mL/min/1.73    GFR est AA 71 >59 mL/min/1.73    BUN/Creatinine ratio 11 (L) 12 - 28    Sodium 143 134 - 144 mmol/L    Potassium 4.7 3.5 - 5.2 mmol/L    Chloride 99 96 - 106 mmol/L    CO2 28 18 - 29 mmol/L    Calcium 9.6 8.7 - 10.3 mg/dL    Protein, total 7.4 6.0 - 8.5 g/dL    Albumin 4.1 3.6 - 4.8 g/dL    GLOBULIN, TOTAL 3.3 1.5 - 4.5 g/dL    A-G Ratio 1.2 1.2 - 2.2    Bilirubin, total 0.3 0.0 - 1.2 mg/dL    Alk. phosphatase 168 (H) 39 - 117 IU/L    AST (SGOT) 15 0 - 40 IU/L    ALT (SGPT) 13 0 - 32 IU/L   CHOLESTEROL, TOTAL   Result Value Ref Range    Cholesterol, total 150 100 - 199 mg/dL   HDL CHOLESTEROL   Result Value Ref Range    HDL Cholesterol 56 >39 mg/dL   CBC WITH AUTOMATED DIFF   Result Value Ref Range    WBC 10.8 3.4 - 10.8 x10E3/uL    RBC 4.48 3.77 - 5.28 x10E6/uL    HGB 10.9 (L) 11.1 - 15.9 g/dL    HCT 34.8 34.0 - 46.6 %    MCV 78 (L) 79 - 97 fL    MCH 24.3 (L) 26.6 - 33.0 pg    MCHC 31.3 (L) 31.5 - 35.7 g/dL    RDW 17.2 (H) 12.3 - 15.4 %    PLATELET 353 559 - 155 x10E3/uL    NEUTROPHILS 73 %    Lymphocytes 19 %    MONOCYTES 6 %    EOSINOPHILS 2 %    BASOPHILS 0 %    ABS. NEUTROPHILS 7.9 (H) 1.4 - 7.0 x10E3/uL    Abs Lymphocytes 2.0 0.7 - 3.1 x10E3/uL    ABS. MONOCYTES 0.6 0.1 - 0.9 x10E3/uL    ABS. EOSINOPHILS 0.3 0.0 - 0.4 x10E3/uL    ABS. BASOPHILS 0.0 0.0 - 0.2 x10E3/uL    IMMATURE GRANULOCYTES 0 %    ABS. IMM. GRANS. 0.0 0.0 - 0.1 x10E3/uL             Physical Exam  Visit Vitals    /67 (BP 1 Location: Left arm, BP Patient Position: Sitting)    Pulse 91    Temp 98.3 °F (36.8 °C) (Oral)    Resp 18    Ht 5' 6\" (1.676 m)    Wt 225 lb 3.2 oz (102.2 kg)    LMP 09/23/2009    SpO2 97%    BMI 36.35 kg/m2     Head: Normocephalic, without obvious abnormality, atraumatic  Eyes: conjunctivae/corneas clear. PERRL, EOM's intact. Neck: supple, symmetrical, trachea midline, no adenopathy, thyroid: not enlarged, symmetric, no tenderness/mass/nodules, no carotid bruit and no JVD  Lungs: clear to auscultation bilaterally  Heart: regular rate and rhythm, S1, S2 normal, no murmur, click, rub or gallop  Extremities: extremities normal, atraumatic, no cyanosis or edema  Pulses: 2+ and symmetric  Lymph nodes: Cervical, supraclavicular, and axillary nodes normal.  Neurologic: Grossly normal          ASSESSMENT and PLAN    ICD-10-CM ICD-9-CM    1.  Uncontrolled type 2 diabetes mellitus without complication, with long-term current use of insulin (HCC) E11.65 250.02 HEMOGLOBIN A1C WITH EAG    Z40.3 G49.64 METABOLIC PANEL, COMPREHENSIVE      CHOLESTEROL, TOTAL      HDL CHOLESTEROL   2. Hyperlipidemia LDL goal <100 E78.5 272.4 CHOLESTEROL, TOTAL      HDL CHOLESTEROL   3. Leukocytosis, unspecified type D72.829 288.60 CBC WITH AUTOMATED DIFF     Diagnoses and all orders for this visit:    1. Uncontrolled type 2 diabetes mellitus without complication, with long-term current use of insulin (HCC)  -     HEMOGLOBIN A1C WITH EAG  -     METABOLIC PANEL, COMPREHENSIVE  -     CHOLESTEROL, TOTAL  -     HDL CHOLESTEROL    2. Hyperlipidemia LDL goal <100  -     CHOLESTEROL, TOTAL  -     HDL CHOLESTEROL    3. Leukocytosis, unspecified type  -     CBC WITH AUTOMATED DIFF    Other orders  -     furosemide (LASIX) 40 mg tablet; Take 1 Tab by mouth daily. For swelling      Follow-up Disposition:  Return in about 3 months (around 11/30/2017), or BP or glucose OOC, for F/U HTN,CHOL,DM, f/u Vitamin D deficiency. lab results and schedule of future lab studies reviewed with patient  reviewed diet, exercise and weight control  cardiovascular risk and specific lipid/LDL goals reviewed  reviewed medications and side effects in detail  Please call my office if there are any questions- [phone]. I will arrange for follow up after the lab tests done from today return  Recommended a weekly \"heart check. \" I went into detail how to do this. Call for refills on medications as needed. Discussed expected course/resolution/complications of diagnosis in detail with patient. Medication risks/benefits/costs/interactions/alternatives discussed with patient. Pt was given an after visit summary which includes diagnoses, current medications & vitals. Pt expressed understanding with the diagnosis and plan.   Total 45 minutes,60 % counseling re: I encouraged pt to continue following a low salt diet; she admits to not doing the best with this over the past few months. She should avoid NSAIDs, as they can cause swelling; according to her chart, she does take naproxen, and she needs to switch to tylenol if possible. Her renal function was normal the last time it was checked in the past year. We have not gotten the results from her endocrinologist recently, so she needs to either bring those with her or encourage the doctor to send them regularly. A combination of leg elevation, support hose, and avoiding standing in one place will help with the swelling, in addition to the Lasix I gave her today and the low sodium diet. Since she hasn't followed up as directed, we will do lab work today to get a baseline for her and make sure there's nothing in the blood causing her swelling. I informed her that insulin tends to make her retain fluids so some of this may be from that, but she needs to have that, so she'll have to counteract the swelling with the other things noted above. Because she doesn't know the name of her medicines, she needs to come in every single time with a list of her updated medications and/or the bottles of the medicines she's taking. Otherwise, we will not be able to treat her effectively, and it may even be dangerous. She is aware of that. Also, discussed symptoms of concern that were noted today in the note above, treatment options( including doing nothing), when to follow up before recommended time frame. Also, answered all questions. Reviewed BP, cholesterol and diabetic goals. LDL goal<100,HDL goal>45, Triglyceride goal<150, A1C< 7.0, BP<140/90. Reviewed in detail the proper technique of checking the blood pressure- check it on an average day only, not on a stressful day, sitting, no exercise for at least 1 hour and not experiencing any new pain( chronic pain is OK).  Patient encouraged to check BP sitting and standing at least once a month and to report these readings to me if > 140/ 90 on average , or if the standing BP is >  15 points lower than the sitting. Reviewed symptoms, or lack thereof, of hypertension and elevated cholesterol. This document was written by Leidy Orr, as dictated by Loi Olmedo MD.  I have reviewed and agree with the above note and have made corrections where appropriate Henry Erazo M.D.

## 2017-08-30 NOTE — PATIENT INSTRUCTIONS
Leg and Ankle Edema: Care Instructions  Your Care Instructions  Swelling in the legs, ankles, and feet is called edema. It is common after you sit or stand for a while. Long plane flights or car rides often cause swelling in the legs and feet. You may also have swelling if you have to stand for long periods of time at your job. Problems with the veins in the legs (varicose veins) and changes in hormones can also cause swelling. Sometimes the swelling in the ankles and feet is caused by a more serious problem, such as heart failure, infection, blood clots, or liver or kidney disease. Follow-up care is a key part of your treatment and safety. Be sure to make and go to all appointments, and call your doctor if you are having problems. Its also a good idea to know your test results and keep a list of the medicines you take. How can you care for yourself at home? · If your doctor gave you medicine, take it as prescribed. Call your doctor if you think you are having a problem with your medicine. · Whenever you are resting, raise your legs up. Try to keep the swollen area higher than the level of your heart. · Take breaks from standing or sitting in one position. ¨ Walk around to increase the blood flow in your lower legs. ¨ Move your feet and ankles often while you stand, or tighten and relax your leg muscles. · Wear support stockings. Put them on in the morning, before swelling gets worse. · Eat a balanced diet. Lose weight if you need to. · Limit the amount of salt (sodium) in your diet. Salt holds fluid in the body and may increase swelling. When should you call for help? Call 911 anytime you think you may need emergency care. For example, call if:  · You have symptoms of a blood clot in your lung (called a pulmonary embolism). These may include:  ¨ Sudden chest pain. ¨ Trouble breathing. ¨ Coughing up blood.   Call your doctor now or seek immediate medical care if:  · You have signs of a blood clot, such as:  ¨ Pain in your calf, back of the knee, thigh, or groin. ¨ Redness and swelling in your leg or groin. · You have symptoms of infection, such as:  ¨ Increased pain, swelling, warmth, or redness. ¨ Red streaks or pus. ¨ A fever. Watch closely for changes in your health, and be sure to contact your doctor if:  · Your swelling is getting worse. · You have new or worsening pain in your legs. · You do not get better as expected. Where can you learn more? Go to http://vignesh-nini.info/. Enter P548 in the search box to learn more about \"Leg and Ankle Edema: Care Instructions. \"  Current as of: March 20, 2017  Content Version: 11.3  © 5812-9853 [employer]. Care instructions adapted under license by [employer] (which disclaims liability or warranty for this information). If you have questions about a medical condition or this instruction, always ask your healthcare professional. [place] [address] any warranty or liability for your use of this information.

## 2017-08-30 NOTE — MR AVS SNAPSHOT
Visit Information Date & Time Provider Department Dept. Phone Encounter #  
 8/30/2017  2:00 PM Nadira Wagner MD [address] [place] [address] [phone] [id number] Your Appointments 9/27/2017  1:45 PM  
ROUTINE CARE with Nadira Wagner MD  
[facility]) Appt Note: 1 month follow up  
 [phone]  
[phone]  
  
   
 [address] [phone] Upcoming Health Maintenance Date Due INFLUENZA AGE 9 TO ADULT 8/1/2017 HEMOGLOBIN A1C Q6M 1/13/2018 MICROALBUMIN Q1 2/13/2018 LIPID PANEL Q1 2/13/2018 MEDICARE YEARLY EXAM 2/14/2018 EYE EXAM RETINAL OR DILATED Q1 4/27/2018 FOOT EXAM Q1 5/3/2018 BREAST CANCER SCRN MAMMOGRAM 2/27/2019 GLAUCOMA SCREENING Q2Y 4/27/2019 COLONOSCOPY 2/11/2023 DTaP/Tdap/Td series (2 - Td) 2/13/2027 Allergies as of 8/30/2017  Review Complete On: 8/30/2017 By: Berle Closs, LPN Severity Noted Reaction Type Reactions Aspirin  03/06/2010    Nausea and Vomiting Can take a coated aspirin. Ciprocinonide  03/06/2010    Swelling Crestor [Rosuvastatin]  03/06/2010    Myalgia Seafood [Shellfish Containing Products]  03/06/2010    Swelling Pt states she does not have a problem with shellfish. Had previous problem with scallops, but has eaten since. Sulfa (Sulfonamide Antibiotics)  03/06/2010    Hives Current Immunizations  Reviewed on 10/8/2012 Name Date Pneumococcal Conjugate (PCV-13) 5/3/2017 dT Vaccine 10/25/2000 Not reviewed this visit You Were Diagnosed With   
  
 Codes Comments Uncontrolled type 2 diabetes mellitus without complication, with long-term current use of insulin ([facility] [address]    -  Primary ICD-10-CM: E11.65, Z79.4 ICD-9-CM: 250.02, V58.67 Hyperlipidemia LDL goal <100     ICD-10-CM: E78.5 ICD-9-CM: 272.4  Leukocytosis, unspecified type     ICD-10-CM: D72.829 
 ICD-9-CM: 288.60 Vitals BP Pulse Temp Resp Height(growth percentile) Weight(growth percentile) 124/67 (BP 1 Location: Left arm, BP Patient Position: Sitting) 91 98.3 °F (36.8 °C) (Oral) 18 5' 6\" (1.676 m) 225 lb 3.2 oz (102.2 kg) LMP SpO2 BMI OB Status Smoking Status 09/23/2009 97% 36.35 kg/m2 Postmenopausal Never Smoker Vitals History BMI and BSA Data Body Mass Index Body Surface Area  
 36.35 kg/m 2 2.18 m 2 Preferred Pharmacy Pharmacy Name [facility] [address] [phone] Your Updated Medication List  
  
   
This list is accurate as of: 8/30/17  3:30 PM.  Always use your most recent med list.  
  
  
  
  
 albuterol 90 mcg/actuation inhaler Commonly known as:  PROVENTIL HFA, VENTOLIN HFA, PROAIR HFA Take 1-2 Puffs by inhalation every four (4) hours as needed for Wheezing. atorvastatin 40 mg tablet Commonly known as:  LIPITOR Take 1 Tab by mouth daily. For cholesterol BD INSULIN PEN NEEDLE UF MINI 31 gauge x 3/16\" Ndle Generic drug:  Insulin Needles (Disposable)  
use four times a day for INSULIN INJECTIONS as directed  
  
 furosemide 40 mg tablet Commonly known as:  LASIX Take 1 Tab by mouth daily. For swelling  
  
 glimepiride 4 mg tablet Commonly known as:  AMARYL  
take 1 tablet every morning  
  
 halobetasol 0.05 % ointment Commonly known as:  ULTRAVATE  
  
 latanoprost 0.005 % ophthalmic solution Commonly known as:  XALATAN  
PLACE 1 DROP IN BOTH EYES EVERY EVENING  
  
 metFORMIN 500 mg tablet Commonly known as:  GLUCOPHAGE Take 1,000 mg by mouth two (2) times daily (with meals). naproxen 500 mg tablet Commonly known as:  NAPROSYN Take 500 mg by mouth two (2) times daily (with meals). NovoLOG Flexpen 100 unit/mL Inpn Generic drug:  insulin aspart 12 Units by SubCUTAneous route Before breakfast, lunch, and dinner. ONETOUCH ULTRA TEST strip Generic drug:  glucose blood VI test strips TRESIBA FLEXTOUCH U-200 200 unit/mL (3 mL) Inpn Generic drug:  insulin degludec  
60 units at bedtime  
  
 triamcinolone acetonide 0.1 % topical cream  
Commonly known as:  KENALOG Apply  to affected area two (2) times a day. use thin layer VALIUM 5 mg tablet Generic drug:  diazePAM  
Take 5 mg by mouth every eight (8) hours as needed (back pain). valsartan 80 mg tablet Commonly known as:  DIOVAN Take 1 Tab by mouth daily. Prescriptions Sent to Pharmacy Refills  
 furosemide (LASIX) 40 mg tablet 1 Sig: Take 1 Tab by mouth daily. For swelling Class: Normal  
 [facility]: [facility] Ph #: [phone] Route: Oral  
  
We Performed the Following CBC WITH AUTOMATED DIFF [06496 CPT(R)] CHOLESTEROL, TOTAL [48966 CPT(R)] HDL CHOLESTEROL [90713 CPT(R)] HEMOGLOBIN A1C WITH EAG [21770 CPT(R)] METABOLIC PANEL, COMPREHENSIVE [55663 CPT(R)] Patient Instructions Leg and Ankle Edema: Care Instructions Your Care Instructions Swelling in the legs, ankles, and feet is called edema. It is common after you sit or stand for a while. Long plane flights or car rides often cause swelling in the legs and feet. You may also have swelling if you have to stand for long periods of time at your job. Problems with the veins in the legs (varicose veins) and changes in hormones can also cause swelling. Sometimes the swelling in the ankles and feet is caused by a more serious problem, such as heart failure, infection, blood clots, or liver or kidney disease. Follow-up care is a key part of your treatment and safety. Be sure to make and go to all appointments, and call your doctor if you are having problems. Its also a good idea to know your test results and keep a list of the medicines you take. How can you care for yourself at home? · If your doctor gave you medicine, take it as prescribed. Call your doctor if you think you are having a problem with your medicine. · Whenever you are resting, raise your legs up. Try to keep the swollen area higher than the level of your heart. · Take breaks from standing or sitting in one position. ¨ Walk around to increase the blood flow in your lower legs. ¨ Move your feet and ankles often while you stand, or tighten and relax your leg muscles. · Wear support stockings. Put them on in the morning, before swelling gets worse. · Eat a balanced diet. Lose weight if you need to. · Limit the amount of salt (sodium) in your diet. Salt holds fluid in the body and may increase swelling. When should you call for help? Call 911 anytime you think you may need emergency care. For example, call if: 
· You have symptoms of a blood clot in your lung (called a pulmonary embolism). These may include: 
¨ Sudden chest pain. ¨ Trouble breathing. ¨ Coughing up blood. Call your doctor now or seek immediate medical care if: 
· You have signs of a blood clot, such as: 
¨ Pain in your calf, back of the knee, thigh, or groin. ¨ Redness and swelling in your leg or groin. · You have symptoms of infection, such as: 
¨ Increased pain, swelling, warmth, or redness. ¨ Red streaks or pus. ¨ A fever. Watch closely for changes in your health, and be sure to contact your doctor if: 
· Your swelling is getting worse. · You have new or worsening pain in your legs. · You do not get better as expected. Where can you learn more? Go to http://vignesh-nini.info/. Enter K565 in the search box to learn more about \"Leg and Ankle Edema: Care Instructions. \" Current as of: March 20, 2017 Content Version: 11.3 © 5129-6471 [employer].  Care instructions adapted under license by [employer] (which disclaims liability or warranty for this information). If you have questions about a medical condition or this instruction, always ask your healthcare professional. [address] any warranty or liability for your use of this information. Introducing \[facility] [place] [facility]\"" SERVICES! Johan Burrell introduces [employer] patient portal. Now you can access parts of your medical record, email your doctor's office, and request medication refills online. 1. In your internet browser, go to [email or web address] 2. Click on the First Time User? Click Here link in the Sign In box. You will see the New Member Sign Up page. 3. Enter your [employer] Access Code exactly as it appears below. You will not need to use this code after youve completed the sign-up process. If you do not sign up before the expiration date, you must request a new code. · [employer] Access Code: [email or web address] Expires: 11/28/2017  3:30 PM 
 
4. Enter the last four digits of your Social Security Number (xxxx) and Date of Birth (mm/dd/yyyy) as indicated and click Submit. You will be taken to the next sign-up page. 5. Create a [employer] ID. This will be your [employer] login ID and cannot be changed, so think of one that is secure and easy to remember. 6. Create a [employer] password. You can change your password at any time. 7. Enter your Password Reset Question and Answer. This can be used at a later time if you forget your password. 8. Enter your e-mail address. You will receive e-mail notification when new information is available in [address]. 9. Click Sign Up. You can now view and download portions of your medical record. 10. Click the Download Summary menu link to download a portable copy of your medical information. If you have questions, please visit the Frequently Asked Questions section of the [employer] website. Remember, [employer] is NOT to be used for urgent needs. For medical emergencies, dial 911. Now available from your iPhone and Android! Please provide this summary of care documentation to your next provider. Your primary care clinician is listed as [address] [address], [facility]/s . If you have any questions after today's visit, please call [phone].

## 2017-08-31 LAB
ALBUMIN SERPL-MCNC: 4.1 G/DL (ref 3.6–4.8)
ALBUMIN/GLOB SERPL: 1.2 {RATIO} (ref 1.2–2.2)
ALP SERPL-CCNC: 168 IU/L (ref 39–117)
ALT SERPL-CCNC: 13 IU/L (ref 0–32)
AST SERPL-CCNC: 15 IU/L (ref 0–40)
BASOPHILS # BLD AUTO: 0 X10E3/UL (ref 0–0.2)
BASOPHILS NFR BLD AUTO: 0 %
BILIRUB SERPL-MCNC: 0.3 MG/DL (ref 0–1.2)
BUN SERPL-MCNC: 11 MG/DL (ref 8–27)
BUN/CREAT SERPL: 11 (ref 12–28)
CALCIUM SERPL-MCNC: 9.6 MG/DL (ref 8.7–10.3)
CHLORIDE SERPL-SCNC: 99 MMOL/L (ref 96–106)
CHOLEST SERPL-MCNC: 150 MG/DL (ref 100–199)
CO2 SERPL-SCNC: 28 MMOL/L (ref 18–29)
CREAT SERPL-MCNC: 0.97 MG/DL (ref 0.57–1)
EOSINOPHIL # BLD AUTO: 0.3 X10E3/UL (ref 0–0.4)
EOSINOPHIL NFR BLD AUTO: 2 %
ERYTHROCYTE [DISTWIDTH] IN BLOOD BY AUTOMATED COUNT: 17.2 % (ref 12.3–15.4)
EST. AVERAGE GLUCOSE BLD GHB EST-MCNC: 183 MG/DL
GLOBULIN SER CALC-MCNC: 3.3 G/DL (ref 1.5–4.5)
GLUCOSE SERPL-MCNC: 97 MG/DL (ref 65–99)
HBA1C MFR BLD: 8 % (ref 4.8–5.6)
HCT VFR BLD AUTO: 34.8 % (ref 34–46.6)
HDLC SERPL-MCNC: 56 MG/DL
HGB BLD-MCNC: 10.9 G/DL (ref 11.1–15.9)
IMM GRANULOCYTES # BLD: 0 X10E3/UL (ref 0–0.1)
IMM GRANULOCYTES NFR BLD: 0 %
LYMPHOCYTES # BLD AUTO: 2 X10E3/UL (ref 0.7–3.1)
LYMPHOCYTES NFR BLD AUTO: 19 %
MCH RBC QN AUTO: 24.3 PG (ref 26.6–33)
MCHC RBC AUTO-ENTMCNC: 31.3 G/DL (ref 31.5–35.7)
MCV RBC AUTO: 78 FL (ref 79–97)
MONOCYTES # BLD AUTO: 0.6 X10E3/UL (ref 0.1–0.9)
MONOCYTES NFR BLD AUTO: 6 %
NEUTROPHILS # BLD AUTO: 7.9 X10E3/UL (ref 1.4–7)
NEUTROPHILS NFR BLD AUTO: 73 %
PLATELET # BLD AUTO: 323 X10E3/UL (ref 150–379)
POTASSIUM SERPL-SCNC: 4.7 MMOL/L (ref 3.5–5.2)
PROT SERPL-MCNC: 7.4 G/DL (ref 6–8.5)
RBC # BLD AUTO: 4.48 X10E6/UL (ref 3.77–5.28)
SODIUM SERPL-SCNC: 143 MMOL/L (ref 134–144)
WBC # BLD AUTO: 10.8 X10E3/UL (ref 3.4–10.8)

## 2017-09-14 ENCOUNTER — TELEPHONE (OUTPATIENT)
Dept: FAMILY MEDICINE CLINIC | Age: 67
End: 2017-09-14

## 2017-09-15 NOTE — TELEPHONE ENCOUNTER
----- Message from Satya Carr MD sent at 9/14/2017 11:30 PM EDT -----  A1C is markedly better @ 8.0( was 10.1) but still need to improve this to <7. Everything else is OK. Normal liver and kidney tests. Is she seeing me or her endocrinologist for future care of the diabetes? If me,       Please check your glucose at least once a day for the next 2 weeks, preferably before meals, or bedtime. You shouldn't have any calories ( liquid or solid food ) for the 3 hours prior to checking the glucose ( if you did, don't waste sticking your finger- the glucose obtained in that situation is almost useless ). Water,diet drinks with zero calories, tea, and black coffee are OK; you may add artificial sweeteners that also have no calories. I also want you to check a few readings 2 hours after you finish a meal so that we can determine which foods elevate your glucose the most.         Please write these down on a piece of paper, or in a log book, and bring this with you when you make a follow up appointment in 2-3 weeks. I look forward to working with you to obtain better control of your diabetes.  . Otherwise, send these results to her endocrinologist

## 2017-09-15 NOTE — PROGRESS NOTES
A1C is markedly better @ 8.0( was 10.1) but still need to improve this to <7. Everything else is OK. Normal liver and kidney tests. Is she seeing me or her endocrinologist for future care of the diabetes? If me,       Please check your glucose at least once a day for the next 2 weeks, preferably before meals, or bedtime. You shouldn't have any calories ( liquid or solid food ) for the 3 hours prior to checking the glucose ( if you did, don't waste sticking your finger- the glucose obtained in that situation is almost useless ). Water,diet drinks with zero calories, tea, and black coffee are OK; you may add artificial sweeteners that also have no calories. I also want you to check a few readings 2 hours after you finish a meal so that we can determine which foods elevate your glucose the most.         Please write these down on a piece of paper, or in a log book, and bring this with you when you make a follow up appointment in 2-3 weeks. I look forward to working with you to obtain better control of your diabetes.  . Otherwise, send these results to her endocrinologist

## 2017-09-15 NOTE — TELEPHONE ENCOUNTER
Patient is calling in regards to a missed call from Garita, tried contacting nurse, no success.     Best call back # for patient: [phone]

## 2017-09-28 ENCOUNTER — OFFICE VISIT (OUTPATIENT)
Dept: FAMILY MEDICINE CLINIC | Age: 67
End: 2017-09-28

## 2017-09-28 VITALS
SYSTOLIC BLOOD PRESSURE: 127 MMHG | BODY MASS INDEX: 36.52 KG/M2 | WEIGHT: 227.2 LBS | RESPIRATION RATE: 29 BRPM | HEART RATE: 99 BPM | OXYGEN SATURATION: 96 % | HEIGHT: 66 IN | TEMPERATURE: 98.6 F | DIASTOLIC BLOOD PRESSURE: 63 MMHG

## 2017-09-28 DIAGNOSIS — F41.8 SITUATIONAL ANXIETY: ICD-10-CM

## 2017-09-28 DIAGNOSIS — E11.9 TYPE 2 DIABETES MELLITUS WITHOUT COMPLICATION, WITH LONG-TERM CURRENT USE OF INSULIN (HCC): ICD-10-CM

## 2017-09-28 DIAGNOSIS — J06.9 VIRAL URI WITH COUGH: ICD-10-CM

## 2017-09-28 DIAGNOSIS — Z79.4 TYPE 2 DIABETES MELLITUS WITHOUT COMPLICATION, WITH LONG-TERM CURRENT USE OF INSULIN (HCC): ICD-10-CM

## 2017-09-28 DIAGNOSIS — I10 ESSENTIAL HYPERTENSION: ICD-10-CM

## 2017-09-28 DIAGNOSIS — E55.9 VITAMIN D DEFICIENCY: ICD-10-CM

## 2017-09-28 DIAGNOSIS — J01.11 ACUTE RECURRENT FRONTAL SINUSITIS: Primary | ICD-10-CM

## 2017-09-28 DIAGNOSIS — E78.5 DYSLIPIDEMIA, GOAL LDL BELOW 100: ICD-10-CM

## 2017-09-28 DIAGNOSIS — J30.1 ALLERGIC RHINITIS DUE TO POLLEN, UNSPECIFIED RHINITIS SEASONALITY: ICD-10-CM

## 2017-09-28 RX ORDER — LORAZEPAM 1 MG/1
TABLET ORAL
Refills: 0 | COMMUNITY
Start: 2017-09-22 | End: 2018-01-25

## 2017-09-28 RX ORDER — PSEUDOEPHEDRINE HCL 30 MG
TABLET ORAL
COMMUNITY
End: 2018-01-13

## 2017-09-28 RX ORDER — AMOXICILLIN 500 MG/1
500 CAPSULE ORAL 3 TIMES DAILY
Qty: 30 CAP | Refills: 0 | Status: SHIPPED | OUTPATIENT
Start: 2017-09-28 | End: 2017-10-08

## 2017-09-28 RX ORDER — INSULIN GLARGINE 300 U/ML
INJECTION, SOLUTION SUBCUTANEOUS
Refills: 0 | COMMUNITY
Start: 2017-08-30 | End: 2018-02-26

## 2017-09-28 NOTE — PROGRESS NOTES
\"Reviewed record in preparation for visit and have obtained the necessary documentation\"  Chief Complaint   Patient presents with    Swelling     1 month follow up, unchanged in bilateral feet     Cold Symptoms     cough,sore throat,nasal/sinus pressure and congestion,yellow phlegm,and drainage x 4 days, OTC Mucinex and Sudafed with mild relief        1. Have you been to the ER, urgent care clinic since your last visit? Hospitalized since your last visit? No    2. Have you seen or consulted any other health care providers outside of the [address] [place] [address] since your last visit? Include any pap smears or colon screening.  No

## 2017-09-28 NOTE — PROGRESS NOTES
HISTORY OF PRESENT ILLNESS  HPI  Jc Mandel is a 78 y.o. Female with history of HTN, DM-II, hyperlipidemia, and vitamin D deficiency who presents to office today for feet swelling. Pt began Lasix 40mg daily on 8/30 for bilateral feet swelling that is worse at night. She states that the swelling, which she has had for 4-5 months now, has not resolved. Pt complains of yellow productive cough worse in the AM, runny nose, and congestion for the past 4 days; she states that her symptoms began with runny nose and congestion. She has tried OTC Mucinex DM and sudafed with some relief. She notes year-round allergies but that her current symptoms do not match her typical allergy symptoms. Pt notes intermittent sharp pain in her ankles, especially her left ankle, that lasts for days at a time.       Past Medical History:   Diagnosis Date    Allergic rhinitis     DDD (degenerative disc disease)     thoracic spine    DJD (degenerative joint disease) of cervical spine     Dyslipidemia, goal LDL below 100 9/29/2017    Eczema     Hypertension     Lichen sclerosus     [facility] at [facility]    ANMOL (obstructive sleep apnea)     CPAP    Other and unspecified hyperlipidemia     Reflux esophagitis     Situational anxiety 9/29/2017    Type II or unspecified type diabetes mellitus without mention of complication, not stated as uncontrolled      Past Surgical History:   Procedure Laterality Date    HX BACK SURGERY      x 3    HX COLONOSCOPY  2013    HX GYN      benign tumors removed from uterus    HX HEENT      sinus surgery for snoring    HX HIP REPLACEMENT  2004    rt    HX ORTHOPAEDIC Bilateral July 2013    hip replacement    HX TONSIL AND ADENOIDECTOMY      HX TUBAL LIGATION      PALATOPHAYNGOPLASTY  2002    [facility] DCMPRN PERQ NUCLEUS PULPOSUS 1/> LEVELS LUMBAR  2003    L3-4     Current Outpatient Prescriptions on File Prior to Visit   Medication Sig Dispense Refill    furosemide (LASIX) 40 mg tablet Take 1 Tab by mouth daily. For swelling 30 Tab 1    naproxen (NAPROSYN) 500 mg tablet Take 500 mg by mouth two (2) times daily (with meals).  diazePAM (VALIUM) 5 mg tablet Take 5 mg by mouth every eight (8) hours as needed (back pain).  albuterol (PROVENTIL HFA, VENTOLIN HFA, PROAIR HFA) 90 mcg/actuation inhaler Take 1-2 Puffs by inhalation every four (4) hours as needed for Wheezing. 1 Inhaler 5    latanoprost (XALATAN) 0.005 % ophthalmic solution PLACE 1 DROP IN BOTH EYES EVERY EVENING  0    valsartan (DIOVAN) 80 mg tablet Take 1 Tab by mouth daily.  atorvastatin (LIPITOR) 40 mg tablet Take 1 Tab by mouth daily. For cholesterol 30 Tab 5    BD INSULIN PEN NEEDLE UF MINI 31 gauge x 3/16\" ndle use four times a day for INSULIN INJECTIONS as directed  1    triamcinolone acetonide (KENALOG) 0.1 % topical cream Apply  to affected area two (2) times a day. use thin layer 80 g 1    insulin aspart (NOVOLOG FLEXPEN) 100 unit/mL inpn 12 Units by SubCUTAneous route Before breakfast, lunch, and dinner.  ONETOUCH ULTRA TEST strip   0    halobetasol (ULTRAVATE) 0.05 % ointment   0    glimepiride (AMARYL) 4 mg tablet take 1 tablet every morning 30 Tab 0    metFORMIN (GLUCOPHAGE) 500 mg tablet Take 1,000 mg by mouth two (2) times daily (with meals). No current facility-administered medications on file prior to visit. Allergies   Allergen Reactions    Aspirin Nausea and Vomiting     Can take a coated aspirin.     Ciprocinonide Swelling    Crestor [Rosuvastatin] Myalgia    Sulfa (Sulfonamide Antibiotics) Hives     Family History   Problem Relation Age of Onset    Cancer Maternal Grandfather      lung    Cancer Paternal Grandmother     Cancer Paternal Grandfather      colon    Heart Attack Mother     Dementia Father      alzheimers    Dementia Maternal Grandmother      alzheimers    Breast Cancer Paternal Aunt      Social History     Social History    Marital status: SINGLE     Spouse name: N/A    Number of children: N/A    Years of education: N/A     Social History Main Topics    Smoking status: Never Smoker    Smokeless tobacco: Never Used    Alcohol use Yes      Comment: occasional    Drug use: No    Sexual activity: Not Asked     Other Topics Concern     Service No    Blood Transfusions No    Caffeine Concern No    Occupational Exposure No    Hobby Hazards No    Sleep Concern Yes    Stress Concern Yes    Weight Concern Yes    Special Diet No    Back Care Yes     3 back surgeries    Exercise No    Bike Helmet Not Asked     n/a    Seat Belt Yes    Self-Exams Yes     Social History Narrative               Review of Systems   Constitutional: Negative for chills, diaphoresis, fever, malaise/fatigue and weight loss. HENT: Positive for congestion. Runny nose   Eyes: Negative for blurred vision, double vision, pain and redness. Respiratory: Positive for cough and sputum production (yellow). Negative for shortness of breath and wheezing. Cardiovascular: Positive for leg swelling (bilateral feet). Negative for chest pain, palpitations, orthopnea, claudication and PND. Musculoskeletal:        Left ankle & right thigh pain   Skin: Negative for itching and rash. Neurological: Negative for dizziness, tingling, tremors, sensory change, speech change, focal weakness, seizures, loss of consciousness, weakness and headaches.      Results for orders placed or performed in visit on 08/30/17   HEMOGLOBIN A1C WITH EAG   Result Value Ref Range    Hemoglobin A1c 8.0 (H) 4.8 - 5.6 %    Estimated average glucose 147 mg/dL   METABOLIC PANEL, COMPREHENSIVE   Result Value Ref Range    Glucose 97 65 - 99 mg/dL    BUN 11 8 - 27 mg/dL    Creatinine 0.97 0.57 - 1.00 mg/dL    GFR est non-AA 61 >59 mL/min/1.73    GFR est AA 71 >59 mL/min/1.73    BUN/Creatinine ratio 11 (L) 12 - 28    Sodium 143 134 - 144 mmol/L    Potassium 4.7 3.5 - 5.2 mmol/L    Chloride 99 96 - 106 mmol/L    CO2 28 18 - 29 mmol/L    Calcium 9.6 8.7 - 10.3 mg/dL    Protein, total 7.4 6.0 - 8.5 g/dL    Albumin 4.1 3.6 - 4.8 g/dL    GLOBULIN, TOTAL 3.3 1.5 - 4.5 g/dL    A-G Ratio 1.2 1.2 - 2.2    Bilirubin, total 0.3 0.0 - 1.2 mg/dL    Alk. phosphatase 168 (H) 39 - 117 IU/L    AST (SGOT) 15 0 - 40 IU/L    ALT (SGPT) 13 0 - 32 IU/L   CHOLESTEROL, TOTAL   Result Value Ref Range    Cholesterol, total 150 100 - 199 mg/dL   HDL CHOLESTEROL   Result Value Ref Range    HDL Cholesterol 56 >39 mg/dL   CBC WITH AUTOMATED DIFF   Result Value Ref Range    WBC 10.8 3.4 - 10.8 x10E3/uL    RBC 4.48 3.77 - 5.28 x10E6/uL    HGB 10.9 (L) 11.1 - 15.9 g/dL    HCT 34.8 34.0 - 46.6 %    MCV 78 (L) 79 - 97 fL    MCH 24.3 (L) 26.6 - 33.0 pg    MCHC 31.3 (L) 31.5 - 35.7 g/dL    RDW 17.2 (H) 12.3 - 15.4 %    PLATELET 809 305 - 109 x10E3/uL    NEUTROPHILS 73 %    Lymphocytes 19 %    MONOCYTES 6 %    EOSINOPHILS 2 %    BASOPHILS 0 %    ABS. NEUTROPHILS 7.9 (H) 1.4 - 7.0 x10E3/uL    Abs Lymphocytes 2.0 0.7 - 3.1 x10E3/uL    ABS. MONOCYTES 0.6 0.1 - 0.9 x10E3/uL    ABS. EOSINOPHILS 0.3 0.0 - 0.4 x10E3/uL    ABS. BASOPHILS 0.0 0.0 - 0.2 x10E3/uL    IMMATURE GRANULOCYTES 0 %    ABS. IMM. GRANS. 0.0 0.0 - 0.1 x10E3/uL             Physical Exam  Visit Vitals    /63 (BP 1 Location: Left arm, BP Patient Position: Sitting)    Pulse 99    Temp 98.6 °F (37 °C) (Oral)    Resp 29    Ht 5' 6\" (1.676 m)    Wt 227 lb 3.2 oz (103.1 kg)    LMP 09/23/2009    SpO2 96%    BMI 36.67 kg/m2     Eyes: conjunctivae/corneas clear. PERRL, EOM's intact. Fundi benign  Ears: Mild decreased light reflex bilaterally, no erythema  Nose: Nares normal. Septum midline.  Moderate to marked amount of congestion and running material bilaterally, little bit of thick yellowish material on the right side  Throat: Lips, mucosa, and tongue normal. Teeth and gums normal  Lungs: Initial rhonchi with the first cough or two but then it clears, no rales, no wheezes  Heart: regular rate and rhythm, S1, S2 normal, no murmur, click, rub or gallop  Extremities: left ankle has mild discomfort laterally under the malleolus aggravated by stretch of the tendon in that area; right thigh has discomfort in the right quad that's minimal to palpation and aggravated very little by use of the muscle            ASSESSMENT and PLAN    ICD-10-CM ICD-9-CM    1. Acute recurrent frontal sinusitis J01.11 461.1 amoxicillin (AMOXIL) 500 mg capsule   2. Viral URI with cough J06.9 465.9 pseudoephedrine (SUDAFED) 30 mg tablet    B97.89  guaiFENesin (MUCINEX) 1,200 mg Ta12 ER tablet   3. Essential hypertension I10 401.9    4. Allergic rhinitis due to pollen, unspecified rhinitis seasonality J30.1 477.0    5. Vitamin D deficiency E55.9 268.9    6. Type 2 diabetes mellitus without complication, with long-term current use of insulin ([facility]) E11.9 250.00 TOUJEO SOLOSTAR 300 unit/mL (1.5 mL) inpn    Z79.4 V58.67    7. Situational anxiety F41.8 300.09 LORazepam (ATIVAN) 1 mg tablet   8. Dyslipidemia, goal LDL below 100 E78.5 272.4      Diagnoses and all orders for this visit:    1. Acute recurrent frontal sinusitis  -     amoxicillin (AMOXIL) 500 mg capsule; Take 1 Cap by mouth three (3) times daily for 10 days. 2. Viral URI with cough    3. Essential hypertension    4. Allergic rhinitis due to pollen, unspecified rhinitis seasonality    5. Vitamin D deficiency    6. Type 2 diabetes mellitus without complication, with long-term current use of insulin ([address]    7. Situational anxiety    8. Dyslipidemia, goal LDL below 100      Follow-up Disposition:  Return in about 1 month (around 10/28/2017), or if left ankle, right thigh pain, resp infection are no better, for F/U DM with glucose readings as directed in 9/14/2017 phone call., F/U HTN,CHOL,DM. reviewed medications and side effects in detail  Please call my office if there are any questions- [phone].   Discussed expected course/resolution/complications of diagnosis in detail with patient. Medication risks/benefits/costs/interactions/alternatives discussed with patient. Pt was given an after visit summary which includes diagnoses, current medications & vitals. Pt expressed understanding with the diagnosis and plan. Total 40 minutes,60 % counseling re: I informed her that the upper resp symptoms probably are due to a virus that's gone into a sinus infection. She can treat the cough symptomatically with Mucinex DM. She should avoid sudafed right now because it can thicken the yellowish material she's having, and in the future she should use it only if she has clear running material.    Reviewed BP, cholesterol and diabetic goals. LDL goal<100,HDL goal>45, Triglyceride goal<150, A1C< 7.0, BP<140/90. For the ankle and thigh discomfort noted above, I recommended ROM stretching exercises and follow-up if things aren't improving. For the diabetes, she should keep adjusting the Novolog with each meal. Her diet history shows that she does consume an intermittent very high carb diet. She does not seem to understand the difference between carbs and non-carb foods, so I encouraged her to check her glucose 2 hours after a meal to see how that particular meal affected her glucose level and adjust the insulin to the result. Also, discussed symptoms of concern that were noted today in the note above, treatment options( including doing nothing), when to follow up before recommended time frame. Also, answered all questions. Patient to call if no better in 3 -4 days and prn new problems. Reviewed BP, cholesterol and diabetic goals. LDL goal<100,HDL goal>45, Triglyceride goal<150, A1C< 7.0, BP<140/90. Reviewed in detail the proper technique of checking the blood pressure- check it on an average day only, not on a stressful day, sitting, no exercise for at least 1 hour and not experiencing any new pain( chronic pain is OK).  Patient encouraged to check BP sitting and standing at least once a month and to report these readings to me if > 140/ 90 on average , or if the standing BP is >  15 points lower than the sitting. Reviewed symptoms, or lack thereof, of hypertension and elevated cholesterol. This document was written by Renny Wong, as dictated by Chase De Leon MD.  I have reviewed and agree with the above note and have made corrections where appropriate Henry Ortega M.D.

## 2017-09-28 NOTE — PATIENT INSTRUCTIONS
Type 2 Diabetes: Care Instructions  Your Care Instructions  Type 2 diabetes is a disease that develops when the body's tissues cannot use insulin properly. Over time, the pancreas cannot make enough insulin. Insulin is a hormone that helps the body's cells use sugar (glucose) for energy. It also helps the body store extra sugar in muscle, fat, and liver cells. Without insulin, the sugar cannot get into the cells to do its work. It stays in the blood instead. This can cause high blood sugar levels. A person has diabetes when the blood sugar stays too high too much of the time. Over time, diabetes can lead to diseases of the heart, blood vessels, nerves, kidneys, and eyes. You may be able to control your blood sugar by losing weight, eating a healthy diet, and getting daily exercise. You may also have to take insulin or other diabetes medicine. Follow-up care is a key part of your treatment and safety. Be sure to make and go to all appointments. Call your doctor if you are having problems. It's also a good idea to know your test results and keep a list of the medicines you take. How can you care for yourself at home? · Keep your blood sugar at a target level (which you set with your doctor). ¨ Eat a good diet that spreads carbohydrate throughout the day. Carbohydrate--the body's main source of fuel--affects blood sugar more than any other nutrient. Carbohydrate is in fruits, vegetables, milk, and yogurt. It also is in breads, cereals, vegetables such as potatoes and corn, and sugary foods such as candy and cakes. ¨ Aim for 30 minutes of exercise on most, preferably all, days of the week. Walking is a good choice. You also may want to do other activities, such as running, swimming, cycling, or playing tennis or team sports. If your doctor says it's okay, do muscle-strengthening exercises at least 2 times a week. ¨ Take your medicines exactly as prescribed.  Call your doctor if you think you are having a problem with your medicine. You will get more details on the specific medicines your doctor prescribes. · Check your blood sugar as often as your doctor recommends. It is important to keep track of any symptoms you have, such as low blood sugar. Also tell your doctor if you have any changes in your activities, diet, or insulin use. · Talk to your doctor before you start taking aspirin every day. Aspirin can help certain people lower their risk of a heart attack or stroke. But taking aspirin isn't right for everyone, because it can cause serious bleeding. · Do not smoke. If you need help quitting, talk to your doctor about stop-smoking programs and medicines. These can increase your chances of quitting for good. · Keep your cholesterol and blood pressure at normal levels. You may need to take one or more medicines to reach your goals. Take them exactly as directed. Do not stop or change a medicine without talking to your doctor first.  When should you call for help? Call 911 anytime you think you may need emergency care. For example, call if:  · You passed out (lost consciousness), or you suddenly become very sleepy or confused. (You may have very low blood sugar.)  Call your doctor now or seek immediate medical care if:  · Your blood sugar is 300 mg/dL or is higher than the level your doctor has set for you. · You have symptoms of low blood sugar, such as:  ¨ Sweating. ¨ Feeling nervous, shaky, and weak. ¨ Extreme hunger and slight nausea. ¨ Dizziness and headache. ¨ Blurred vision. ¨ Confusion. Watch closely for changes in your health, and be sure to contact your doctor if:  · You often have problems controlling your blood sugar. · You have symptoms of long-term diabetes problems, such as:  ¨ New vision changes. ¨ New pain, numbness, or tingling in your hands or feet. ¨ Skin problems. Where can you learn more? Go to http://vignesh-nini.info/.   Enter C553 in the search box to learn more about \"Type 2 Diabetes: Care Instructions. \"  Current as of: March 13, 2017  Content Version: 11.3  © 5679-7884 [employer], [email or web address]. Care instructions adapted under license by [employer] (which disclaims liability or warranty for this information). If you have questions about a medical condition or this instruction, always ask your healthcare professional. [address] any warranty or liability for your use of this information.

## 2017-09-28 NOTE — MR AVS SNAPSHOT
Visit Information Date & Time Provider Department Dept. Phone Encounter #  
 9/28/2017 10:00 AM Nadira Wagner  [facility] [phone] [id number] Upcoming Health Maintenance Date Due INFLUENZA AGE 9 TO ADULT 8/1/2017 MICROALBUMIN Q1 2/13/2018 MEDICARE YEARLY EXAM 2/14/2018 HEMOGLOBIN A1C Q6M 2/28/2018 EYE EXAM RETINAL OR DILATED Q1 4/27/2018 FOOT EXAM Q1 5/3/2018 LIPID PANEL Q1 8/30/2018 BREAST CANCER SCRN MAMMOGRAM 2/27/2019 GLAUCOMA SCREENING Q2Y 4/27/2019 COLONOSCOPY 2/11/2023 DTaP/Tdap/Td series (2 - Td) 2/13/2027 Allergies as of 9/28/2017  Review Complete On: 9/28/2017 By: Nadira Wagner MD  
  
 Severity Noted Reaction Type Reactions Aspirin  03/06/2010    Nausea and Vomiting Can take a coated aspirin. Ciprocinonide  03/06/2010    Swelling Crestor [Rosuvastatin]  03/06/2010    Myalgia Sulfa (Sulfonamide Antibiotics)  03/06/2010    Hives Current Immunizations  Reviewed on 10/8/2012 Name Date Pneumococcal Conjugate (PCV-13) 5/3/2017 dT Vaccine 10/25/2000 Not reviewed this visit Vitals BP Pulse Temp Resp Height(growth percentile) Weight(growth percentile) 127/63 (BP 1 Location: Left arm, BP Patient Position: Sitting) 99 98.6 °F (37 °C) (Oral) 29 5' 6\" (1.676 m) 227 lb 3.2 oz (103.1 kg) LMP SpO2 BMI OB Status Smoking Status 09/23/2009 96% 36.67 kg/m2 Postmenopausal Never Smoker Vitals History BMI and BSA Data Body Mass Index Body Surface Area  
 36.67 kg/m 2 2.19 m 2 Preferred Pharmacy Pharmacy Name Phone [facility] [address] [phone] Your Updated Medication List  
  
   
This list is accurate as of: 9/28/17 12:37 PM.  Always use your most recent med list.  
  
  
  
  
 albuterol 90 mcg/actuation inhaler Commonly known as:  PROVENTIL HFA, VENTOLIN HFA, PROAIR HFA  
 Take 1-2 Puffs by inhalation every four (4) hours as needed for Wheezing. amoxicillin 500 mg capsule Commonly known as:  AMOXIL Take 1 Cap by mouth three (3) times daily for 10 days. atorvastatin 40 mg tablet Commonly known as:  LIPITOR Take 1 Tab by mouth daily. For cholesterol BD INSULIN PEN NEEDLE UF MINI 31 gauge x 3/16\" Ndle Generic drug:  Insulin Needles (Disposable)  
use four times a day for INSULIN INJECTIONS as directed  
  
 furosemide 40 mg tablet Commonly known as:  LASIX Take 1 Tab by mouth daily. For swelling  
  
 glimepiride 4 mg tablet Commonly known as:  AMARYL  
take 1 tablet every morning  
  
 halobetasol 0.05 % ointment Commonly known as:  ULTRAVATE  
  
 latanoprost 0.005 % ophthalmic solution Commonly known as:  XALATAN  
PLACE 1 DROP IN BOTH EYES EVERY EVENING  
  
 LORazepam 1 mg tablet Commonly known as:  ATIVAN  
TAKE 1 TABLET BY MOUTH 1/2 PRIOR TO MRI REPEAT IF NEEDED  
  
 metFORMIN 500 mg tablet Commonly known as:  GLUCOPHAGE Take 1,000 mg by mouth two (2) times daily (with meals). MUCINEX 1,200 mg Ta12 ER tablet Generic drug:  guaiFENesin Take 1,200 mg by mouth two (2) times a day. naproxen 500 mg tablet Commonly known as:  NAPROSYN Take 500 mg by mouth two (2) times daily (with meals). NovoLOG Flexpen 100 unit/mL Inpn Generic drug:  insulin aspart 12 Units by SubCUTAneous route Before breakfast, lunch, and dinner. ONETOUCH ULTRA TEST strip Generic drug:  glucose blood VI test strips SUDAFED 30 mg tablet Generic drug:  pseudoephedrine Take  by mouth every four (4) hours as needed for Congestion. TOUJEO SOLOSTAR 300 unit/mL (1.5 mL) Inpn Generic drug:  insulin glargine INJECT 60 UNITS SUBCUTANEOUSLY ONCE A DAY  
  
 TRESIBA FLEXTOUCH U-200 200 unit/mL (3 mL) Inpn Generic drug:  insulin degludec  
60 units at bedtime  
  
 triamcinolone acetonide 0.1 % topical cream  
 Commonly known as:  KENALOG Apply  to affected area two (2) times a day. use thin layer VALIUM 5 mg tablet Generic drug:  diazePAM  
Take 5 mg by mouth every eight (8) hours as needed (back pain). valsartan 80 mg tablet Commonly known as:  DIOVAN Take 1 Tab by mouth daily. Prescriptions Sent to Pharmacy Refills  
 amoxicillin (AMOXIL) 500 mg capsule 0 Sig: Take 1 Cap by mouth three (3) times daily for 10 days. Class: Normal  
 [facility]: [facility] Ph #: [phone] Route: Oral  
  
Patient Instructions Type 2 Diabetes: Care Instructions Your Care Instructions Type 2 diabetes is a disease that develops when the body's tissues cannot use insulin properly. Over time, the pancreas cannot make enough insulin. Insulin is a hormone that helps the body's cells use sugar (glucose) for energy. It also helps the body store extra sugar in muscle, fat, and liver cells. Without insulin, the sugar cannot get into the cells to do its work. It stays in the blood instead. This can cause high blood sugar levels. A person has diabetes when the blood sugar stays too high too much of the time. Over time, diabetes can lead to diseases of the heart, blood vessels, nerves, kidneys, and eyes. You may be able to control your blood sugar by losing weight, eating a healthy diet, and getting daily exercise. You may also have to take insulin or other diabetes medicine. Follow-up care is a key part of your treatment and safety. Be sure to make and go to all appointments. Call your doctor if you are having problems. It's also a good idea to know your test results and keep a list of the medicines you take. How can you care for yourself at home? · Keep your blood sugar at a target level (which you set with your doctor). ¨ Eat a good diet that spreads carbohydrate throughout the day. Carbohydratethe body's main source of fuelaffects blood sugar more than any other nutrient. Carbohydrate is in fruits, vegetables, milk, and yogurt. It also is in breads, cereals, vegetables such as potatoes and corn, and sugary foods such as candy and cakes. ¨ Aim for 30 minutes of exercise on most, preferably all, days of the week. Walking is a good choice. You also may want to do other activities, such as running, swimming, cycling, or playing tennis or team sports. If your doctor says it's okay, do muscle-strengthening exercises at least 2 times a week. ¨ Take your medicines exactly as prescribed. Call your doctor if you think you are having a problem with your medicine. You will get more details on the specific medicines your doctor prescribes. · Check your blood sugar as often as your doctor recommends. It is important to keep track of any symptoms you have, such as low blood sugar. Also tell your doctor if you have any changes in your activities, diet, or insulin use. · Talk to your doctor before you start taking aspirin every day. Aspirin can help certain people lower their risk of a heart attack or stroke. But taking aspirin isn't right for everyone, because it can cause serious bleeding. · Do not smoke. If you need help quitting, talk to your doctor about stop-smoking programs and medicines. These can increase your chances of quitting for good. · Keep your cholesterol and blood pressure at normal levels. You may need to take one or more medicines to reach your goals. Take them exactly as directed. Do not stop or change a medicine without talking to your doctor first. 
When should you call for help? Call 911 anytime you think you may need emergency care. For example, call if: 
· You passed out (lost consciousness), or you suddenly become very sleepy or confused. (You may have very low blood sugar.) Call your doctor now or seek immediate medical care if: · Your blood sugar is 300 mg/dL or is higher than the level your doctor has set for you. · You have symptoms of low blood sugar, such as: ¨ Sweating. ¨ Feeling nervous, shaky, and weak. ¨ Extreme hunger and slight nausea. ¨ Dizziness and headache. ¨ Blurred vision. ¨ Confusion. Watch closely for changes in your health, and be sure to contact your doctor if: 
· You often have problems controlling your blood sugar. · You have symptoms of long-term diabetes problems, such as: ¨ New vision changes. ¨ New pain, numbness, or tingling in your hands or feet. ¨ Skin problems. Where can you learn more? Go to http://vignesh-nini.info/. Enter C553 in the search box to learn more about \"Type 2 Diabetes: Care Instructions. \" Current as of: March 13, 2017 Content Version: 11.3 © 7505-1661 [employer]. Care instructions adapted under license by [employer] (which disclaims liability or warranty for this information). If you have questions about a medical condition or this instruction, always ask your healthcare professional. [place] [address] any warranty or liability for your use of this information. Introducing [facility]! [address] introduces [employer] patient portal. Now you can access parts of your medical record, email your doctor's office, and request medication refills online. 1. In your internet browser, go to [email or web address] 2. Click on the First Time User? Click Here link in the Sign In box. You will see the New Member Sign Up page. 3. Enter your [employer] Access Code exactly as it appears below. You will not need to use this code after youve completed the sign-up process. If you do not sign up before the expiration date, you must request a new code. · [employer] Access Code: [email or web address] Expires: 11/28/2017  3:30 PM 
 
4.  Enter the last four digits of your Social Security Number (xxxx) and Date of Birth (mm/dd/yyyy) as indicated and click Submit. You will be taken to the next sign-up page. 5. Create a DesignLine ID. This will be your DesignLine login ID and cannot be changed, so think of one that is secure and easy to remember. 6. Create a DesignLine password. You can change your password at any time. 7. Enter your Password Reset Question and Answer. This can be used at a later time if you forget your password. 8. Enter your e-mail address. You will receive e-mail notification when new information is available in [address] 9. Click Sign Up. You can now view and download portions of your medical record. 10. Click the Download Summary menu link to download a portable copy of your medical information. If you have questions, please visit the Frequently Asked Questions section of the [employer] website. Remember, DesignLine is NOT to be used for urgent needs. For medical emergencies, dial 911. Now available from your iPhone and Android! Please provide this summary of care documentation to your next provider. Your primary care clinician is listed as [address] [place] [address], [facility]/s . If you have any questions after today's visit, please call [phone].

## 2017-09-29 PROBLEM — E11.9 TYPE 2 DIABETES MELLITUS WITHOUT COMPLICATION, WITH LONG-TERM CURRENT USE OF INSULIN (HCC): Status: ACTIVE | Noted: 2017-09-29

## 2017-09-29 PROBLEM — F41.8 SITUATIONAL ANXIETY: Status: ACTIVE | Noted: 2017-09-29

## 2017-09-29 PROBLEM — Z79.4 TYPE 2 DIABETES MELLITUS WITHOUT COMPLICATION, WITH LONG-TERM CURRENT USE OF INSULIN (HCC): Status: ACTIVE | Noted: 2017-09-29

## 2017-09-29 PROBLEM — E78.5 DYSLIPIDEMIA, GOAL LDL BELOW 100: Status: ACTIVE | Noted: 2017-09-29

## 2018-01-12 ENCOUNTER — TELEPHONE (OUTPATIENT)
Dept: FAMILY MEDICINE CLINIC | Age: 68
End: 2018-01-12

## 2018-01-12 NOTE — TELEPHONE ENCOUNTER
Patient would like for Dr Washington Darling or his nurse to call her back. because she been vomiting up liquid and she is having pain in her lower back, She is also afraid  to eat anything.  Please contact the patient at [phone]

## 2018-01-12 NOTE — TELEPHONE ENCOUNTER
MD Lex Corral LPN        Caller: Unspecified (Today, 10:58 AM)                     If getting worse each day, needs to be seen < 24 hrs, either here tomorrow or at urgent care today. Patient will call in am for sat appointment.  To ER if gets worse tonight

## 2018-01-12 NOTE — TELEPHONE ENCOUNTER
Started on left side back pain for 4 days around to stomach like gas pain. Used gas x . Feels nauseated but no vomiting. No problems urinating. Soft stool no fever. Feels  Like gas in her stomach.    Wants to know what to do

## 2018-01-13 ENCOUNTER — HOSPITAL ENCOUNTER (OUTPATIENT)
Dept: LAB | Age: 68
Discharge: HOME | End: 2018-01-13
Payer: MEDICARE

## 2018-01-13 ENCOUNTER — OFFICE VISIT (OUTPATIENT)
Dept: FAMILY MEDICINE CLINIC | Age: 68
End: 2018-01-13

## 2018-01-13 ENCOUNTER — TELEPHONE (OUTPATIENT)
Dept: FAMILY MEDICINE CLINIC | Age: 68
End: 2018-01-13

## 2018-01-13 VITALS
SYSTOLIC BLOOD PRESSURE: 137 MMHG | BODY MASS INDEX: 35.36 KG/M2 | OXYGEN SATURATION: 96 % | HEART RATE: 99 BPM | WEIGHT: 220 LBS | RESPIRATION RATE: 22 BRPM | HEIGHT: 66 IN | TEMPERATURE: 98.2 F | DIASTOLIC BLOOD PRESSURE: 78 MMHG

## 2018-01-13 DIAGNOSIS — R14.1 GAS PAIN: ICD-10-CM

## 2018-01-13 DIAGNOSIS — E87.5 HYPERKALEMIA: Primary | ICD-10-CM

## 2018-01-13 DIAGNOSIS — R10.9 LEFT FLANK PAIN: Primary | ICD-10-CM

## 2018-01-13 DIAGNOSIS — R79.89 ELEVATED TSH: ICD-10-CM

## 2018-01-13 PROBLEM — E11.21 TYPE 2 DIABETES MELLITUS WITH NEPHROPATHY (HCC): Status: ACTIVE | Noted: 2018-01-13

## 2018-01-13 LAB
BILIRUB UR QL STRIP: NEGATIVE
GLUCOSE UR-MCNC: NEGATIVE MG/DL
KETONES P FAST UR STRIP-MCNC: NEGATIVE MG/DL
PH UR STRIP: 5.5 [PH] (ref 4.6–8)
PROT UR QL STRIP: NEGATIVE
SP GR UR STRIP: 1.02 (ref 1–1.03)
UA UROBILINOGEN AMB POC: NORMAL (ref 0.2–1)
URINALYSIS CLARITY POC: NORMAL
URINALYSIS COLOR POC: NORMAL
URINE BLOOD POC: NEGATIVE
URINE LEUKOCYTES POC: NEGATIVE
URINE NITRITES POC: NEGATIVE

## 2018-01-13 PROCEDURE — 85025 COMPLETE CBC W/AUTO DIFF WBC: CPT

## 2018-01-13 PROCEDURE — 80053 COMPREHEN METABOLIC PANEL: CPT

## 2018-01-13 PROCEDURE — 80061 LIPID PANEL: CPT

## 2018-01-13 PROCEDURE — 36415 COLL VENOUS BLD VENIPUNCTURE: CPT

## 2018-01-13 PROCEDURE — 83036 HEMOGLOBIN GLYCOSYLATED A1C: CPT

## 2018-01-13 PROCEDURE — 84443 ASSAY THYROID STIM HORMONE: CPT

## 2018-01-13 RX ORDER — CIPROFLOXACIN 500 MG/1
500 TABLET ORAL 2 TIMES DAILY
Qty: 20 TAB | Refills: 0 | Status: SHIPPED | OUTPATIENT
Start: 2018-01-13 | End: 2018-01-23

## 2018-01-13 RX ORDER — CALCIUM CARBONATE 200(500)MG
1 TABLET,CHEWABLE ORAL DAILY
COMMUNITY
End: 2018-02-26

## 2018-01-13 RX ORDER — ONDANSETRON 4 MG/1
4 TABLET, ORALLY DISINTEGRATING ORAL
Qty: 20 TAB | Refills: 1 | Status: SHIPPED | OUTPATIENT
Start: 2018-01-13 | End: 2018-01-25

## 2018-01-13 RX ORDER — METRONIDAZOLE 500 MG/1
500 TABLET ORAL 2 TIMES DAILY
Qty: 28 TAB | Refills: 0 | Status: SHIPPED | OUTPATIENT
Start: 2018-01-13 | End: 2018-01-23

## 2018-01-13 NOTE — PATIENT INSTRUCTIONS
To prevent c-diff, a serious colon infection caused by antibiotics, take a probiotic (available over-the-counter) daily between antibiotic dosages and for several days after finishing the antibiotic. Abdominal Pain: Care Instructions  Your Care Instructions    Abdominal pain has many possible causes. Some aren't serious and get better on their own in a few days. Others need more testing and treatment. If your pain continues or gets worse, you need to be rechecked and may need more tests to find out what is wrong. You may need surgery to correct the problem. Don't ignore new symptoms, such as fever, nausea and vomiting, urination problems, pain that gets worse, and dizziness. These may be signs of a more serious problem. Your doctor may have recommended a follow-up visit in the next 8 to 12 hours. If you are not getting better, you may need more tests or treatment. The doctor has checked you carefully, but problems can develop later. If you notice any problems or new symptoms, get medical treatment right away. Follow-up care is a key part of your treatment and safety. Be sure to make and go to all appointments, and call your doctor if you are having problems. It's also a good idea to know your test results and keep a list of the medicines you take. How can you care for yourself at home? · Rest until you feel better. · To prevent dehydration, drink plenty of fluids, enough so that your urine is light yellow or clear like water. Choose water and other caffeine-free clear liquids until you feel better. If you have kidney, heart, or liver disease and have to limit fluids, talk with your doctor before you increase the amount of fluids you drink. · If your stomach is upset, eat mild foods, such as rice, dry toast or crackers, bananas, and applesauce. Try eating several small meals instead of two or three large ones.   · Wait until 48 hours after all symptoms have gone away before you have spicy foods, alcohol, and drinks that contain caffeine. · Do not eat foods that are high in fat. · Avoid anti-inflammatory medicines such as aspirin, ibuprofen (Advil, Motrin), and naproxen (Aleve). These can cause stomach upset. Talk to your doctor if you take daily aspirin for another health problem. When should you call for help? Call 911 anytime you think you may need emergency care. For example, call if:  ? · You passed out (lost consciousness). ? · You pass maroon or very bloody stools. ? · You vomit blood or what looks like coffee grounds. ? · You have new, severe belly pain. ?Call your doctor now or seek immediate medical care if:  ? · Your pain gets worse, especially if it becomes focused in one area of your belly. ? · You have a new or higher fever. ? · Your stools are black and look like tar, or they have streaks of blood. ? · You have unexpected vaginal bleeding. ? · You have symptoms of a urinary tract infection. These may include:  ¨ Pain when you urinate. ¨ Urinating more often than usual.  ¨ Blood in your urine. ? · You are dizzy or lightheaded, or you feel like you may faint. ? Watch closely for changes in your health, and be sure to contact your doctor if:  ? · You are not getting better after 1 day (24 hours). Where can you learn more? Go to http://vignesh-nini.info/. Enter V205 in the search box to learn more about \"Abdominal Pain: Care Instructions. \"  Current as of: March 20, 2017  Content Version: 11.4  © 4084-9436 [employer]. Care instructions adapted under license by [employer] (which disclaims liability or warranty for this information). If you have questions about a medical condition or this instruction, always ask your healthcare professional. [address] any warranty or liability for your use of this information.

## 2018-01-13 NOTE — MR AVS SNAPSHOT
Visit Information Date & Time Provider Department Dept. Phone Encounter #  
 1/13/2018 10:30 AM David Meyers, [facility] [phone] [id number] Follow-up Instructions Return if symptoms worsen or fail to improve. Upcoming Health Maintenance Date Due Influenza Age 5 to Adult 8/1/2017 MICROALBUMIN Q1 2/13/2018 MEDICARE YEARLY EXAM 2/14/2018 HEMOGLOBIN A1C Q6M 2/28/2018 FOOT EXAM Q1 5/3/2018 LIPID PANEL Q1 8/30/2018 EYE EXAM RETINAL OR DILATED Q1 11/27/2018 BREAST CANCER SCRN MAMMOGRAM 2/27/2019 GLAUCOMA SCREENING Q2Y 11/27/2019 COLONOSCOPY 2/11/2023 DTaP/Tdap/Td series (2 - Td) 2/13/2027 Allergies as of 1/13/2018  Review Complete On: 1/13/2018 By: David Meyers MD  
  
 Severity Noted Reaction Type Reactions Aspirin  03/06/2010    Nausea and Vomiting Can take a coated aspirin. Ciprocinonide  03/06/2010    Swelling Crestor [Rosuvastatin]  03/06/2010    Myalgia Sulfa (Sulfonamide Antibiotics)  03/06/2010    Hives Current Immunizations  Reviewed on 10/8/2012 Name Date Pneumococcal Conjugate (PCV-13) 5/3/2017 dT Vaccine 10/25/2000 Not reviewed this visit You Were Diagnosed With   
  
 Codes Comments Left flank pain    -  Primary ICD-10-CM: R10.9 ICD-9-CM: 789.09 Gas pain     ICD-10-CM: R14.1 ICD-9-CM: 787.3 Uncontrolled type 2 diabetes mellitus with diabetic nephropathy, with long-term current use of insulin (HCC)     ICD-10-CM: E11.21, E11.65, Z79.4 ICD-9-CM: 250.42, 583.81, V58.67 Elevated TSH     ICD-10-CM: R94.6 ICD-9-CM: 794.5 Vitals BP Pulse Temp Resp Height(growth percentile) Weight(growth percentile)  
 137/78 (BP 1 Location: Left arm, BP Patient Position: Sitting) 99 98.2 °F (36.8 °C) (Oral) 22 5' 6\" (1.676 m) 220 lb (99.8 kg) LMP SpO2 BMI OB Status Smoking Status 09/23/2009 96% 35.51 kg/m2 Postmenopausal Never Smoker Vitals History BMI and BSA Data Body Mass Index Body Surface Area 35.51 kg/m 2 2.16 m 2 Preferred Pharmacy [facility] [address] [facility] [phone] Your Updated Medication List  
  
   
This list is accurate as of: 1/13/18 11:28 AM.  Always use your most recent med list.  
  
  
  
  
 albuterol 90 mcg/actuation inhaler Commonly known as:  PROVENTIL HFA, VENTOLIN HFA, PROAIR HFA Take 1-2 Puffs by inhalation every four (4) hours as needed for Wheezing. atorvastatin 40 mg tablet Commonly known as:  LIPITOR Take 1 Tab by mouth daily. For cholesterol BD INSULIN PEN NEEDLE UF MINI 31 gauge x 3/16\" Ndle Generic drug:  Insulin Needles (Disposable)  
use four times a day for INSULIN INJECTIONS as directed  
  
 calcium carbonate 200 mg calcium (500 mg) Chew Commonly known as:  TUMS Take 1 Tab by mouth daily. ciprofloxacin HCl 500 mg tablet Commonly known as:  CIPRO Take 1 Tab by mouth two (2) times a day for 10 days. furosemide 40 mg tablet Commonly known as:  LASIX Take 1 Tab by mouth daily. For swelling  
  
 glimepiride 4 mg tablet Commonly known as:  AMARYL  
take 1 tablet every morning  
  
 halobetasol 0.05 % ointment Commonly known as:  ULTRAVATE  
  
 latanoprost 0.005 % ophthalmic solution Commonly known as:  XALATAN  
PLACE 1 DROP IN BOTH EYES EVERY EVENING  
  
 LORazepam 1 mg tablet Commonly known as:  ATIVAN  
TAKE 1 TABLET BY MOUTH 1/2 PRIOR TO MRI REPEAT IF NEEDED  
  
 metFORMIN 500 mg tablet Commonly known as:  GLUCOPHAGE Take 1,000 mg by mouth two (2) times daily (with meals). metroNIDAZOLE 500 mg tablet Commonly known as:  FLAGYL Take 1 Tab by mouth two (2) times a day for 10 days. MUCINEX 1,200 mg Ta12 ER tablet Generic drug:  guaiFENesin Take 1,200 mg by mouth two (2) times a day. NovoLOG Flexpen 100 unit/mL Inpn Generic drug:  insulin aspart 12 Units by SubCUTAneous route Before breakfast, lunch, and dinner. ondansetron 4 mg disintegrating tablet Commonly known as:  ZOFRAN ODT Take 1 Tab by mouth every eight (8) hours as needed for Nausea. ONETOUCH ULTRA TEST strip Generic drug:  glucose blood VI test strips TOUJEO SOLOSTAR 300 unit/mL (1.5 mL) Inpn Generic drug:  insulin glargine INJECT 60 UNITS SUBCUTANEOUSLY ONCE A DAY VALIUM 5 mg tablet Generic drug:  diazePAM  
Take 5 mg by mouth every eight (8) hours as needed (back pain). valsartan 80 mg tablet Commonly known as:  DIOVAN Take 1 Tab by mouth daily. Prescriptions Sent to Pharmacy Refills  
 metroNIDAZOLE (FLAGYL) 500 mg tablet 0 Sig: Take 1 Tab by mouth two (2) times a day for 10 days. Class: Normal  
 Pharm[facility] #: [phone] Route: Oral  
 ciprofloxacin HCl (CIPRO) 500 mg tablet 0 Sig: Take 1 Tab by mouth two (2) times a day for 10 days. Class: Normal  
 Pharmacy: [facility] #: [phone] Route: Oral  
 ondansetron (ZOFRAN ODT) 4 mg disintegrating tablet 1 Sig: Take 1 Tab by mouth every eight (8) hours as needed for Nausea. Class: Normal  
 [facility]: [phone] Route: Oral  
  
We Performed the Following AMB POC URINALYSIS DIP STICK AUTO W/O MICRO [45302 CPT(R)] CBC WITH AUTOMATED DIFF [78073 CPT(R)] HEMOGLOBIN A1C WITH EAG [22356 CPT(R)] LIPID PANEL [59868 CPT(R)] METABOLIC PANEL, COMPREHENSIVE [52971 CPT(R)] TSH 3RD GENERATION [01619 CPT(R)] Follow-up Instructions Return if symptoms worsen or fail to improve. Patient Instructions To prevent c-diff, a serious colon infection caused by antibiotics, take a probiotic (available over-the-counter) daily between antibiotic dosages and for several days after finishing the antibiotic. Abdominal Pain: Care Instructions Your Care Instructions Abdominal pain has many possible causes. Some aren't serious and get better on their own in a few days. Others need more testing and treatment. If your pain continues or gets worse, you need to be rechecked and may need more tests to find out what is wrong. You may need surgery to correct the problem. Don't ignore new symptoms, such as fever, nausea and vomiting, urination problems, pain that gets worse, and dizziness. These may be signs of a more serious problem. Your doctor may have recommended a follow-up visit in the next 8 to 12 hours. If you are not getting better, you may need more tests or treatment. The doctor has checked you carefully, but problems can develop later. If you notice any problems or new symptoms, get medical treatment right away. Follow-up care is a key part of your treatment and safety. Be sure to make and go to all appointments, and call your doctor if you are having problems. It's also a good idea to know your test results and keep a list of the medicines you take. How can you care for yourself at home? · Rest until you feel better. · To prevent dehydration, drink plenty of fluids, enough so that your urine is light yellow or clear like water. Choose water and other caffeine-free clear liquids until you feel better. If you have kidney, heart, or liver disease and have to limit fluids, talk with your doctor before you increase the amount of fluids you drink. · If your stomach is upset, eat mild foods, such as rice, dry toast or crackers, bananas, and applesauce. Try eating several small meals instead of two or three large ones. · Wait until 48 hours after all symptoms have gone away before you have spicy foods, alcohol, and drinks that contain caffeine. · Do not eat foods that are high in fat. · Avoid anti-inflammatory medicines such as aspirin, ibuprofen (Advil, Motrin), and naproxen (Aleve). These can cause stomach upset. Talk to your doctor if you take daily aspirin for another health problem. When should you call for help? Call 911 anytime you think you may need emergency care. For example, call if: 
? · You passed out (lost consciousness). ? · You pass maroon or very bloody stools. ? · You vomit blood or what looks like coffee grounds. ? · You have new, severe belly pain. ?Call your doctor now or seek immediate medical care if: 
? · Your pain gets worse, especially if it becomes focused in one area of your belly. ? · You have a new or higher fever. ? · Your stools are black and look like tar, or they have streaks of blood. ? · You have unexpected vaginal bleeding. ? · You have symptoms of a urinary tract infection. These may include: 
¨ Pain when you urinate. ¨ Urinating more often than usual. 
¨ Blood in your urine. ? · You are dizzy or lightheaded, or you feel like you may faint. ? Watch closely for changes in your health, and be sure to contact your doctor if: 
? · You are not getting better after 1 day (24 hours). Where can you learn more? Go to http://vignesh-nini.info/. Enter C634 in the search box to learn more about \"Abdominal Pain: Care Instructions. \" Current as of: March 20, 2017 Content Version: 11.4 © 0750-7341 [employer]. Care instructions adapted under license by [employer] (which disclaims liability or warranty for this information). If you have questions about a medical condition or this instruction, always ask your healthcare professional. [address] any warranty or liability for your use of this information. Introducing [facility]!    
 Tadeo Burdick introduces [employer] patient portal. Now you can access parts of your medical record, email your doctor's office, and request medication refills online. 1. In your internet browser, go to [email or web address] 2. Click on the First Time User? Click Here link in the Sign In box. You will see the New Member Sign Up page. 3. Enter your [employer] Access Code exactly as it appears below. You will not need to use this code after youve completed the sign-up process. If you do not sign up before the expiration date, you must request a new code. · [employer] Access Code: [email or web address] Expires: 4/13/2018 11:25 AM 
 
4. Enter the last four digits of your Social Security Number (xxxx) and Date of Birth (mm/dd/yyyy) as indicated and click Submit. You will be taken to the next sign-up page. 5. Create a [employer] ID. This will be your [employer] login ID and cannot be changed, so think of one that is secure and easy to remember. 6. Create a [employer] password. You can change your password at any time. 7. Enter your Password Reset Question and Answer. This can be used at a later time if you forget your password. 8. Enter your e-mail address. You will receive e-mail notification when new information is available in [address] 9. Click Sign Up. You can now view and download portions of your medical record. 10. Click the Download Summary menu link to download a portable copy of your medical information. If you have questions, please visit the Frequently Asked Questions section of the [employer] website. Remember, [employer] is NOT to be used for urgent needs. For medical emergencies, dial 911. Now available from your iPhone and Android! Please provide this summary of care documentation to your next provider. Your primary care clinician is listed as [facility]/s  If you have any questions after today's visit, please call [phone].

## 2018-01-13 NOTE — TELEPHONE ENCOUNTER
Called pt about labs  Wbc not up from last time  Creatinine slightly up but still in the same range  K mildly up  She had a lot of concerns about the antibiotics  Discussed risks of various side effects  I think best for her to go ahead and try them as diverticulitis seems likely; she was tender in LLQ and symptoms pretty typical.    Would also recommend hold losartan and recheck bmp Monday, will call back Monday with those results (which I will order stat). She expressed understanding.

## 2018-01-13 NOTE — PROGRESS NOTES
Sacha Kenyon [address] [facility]. [place], [address]  [phone]             Date of visit: 1/13/2018   Subjective:      History obtained from:  the patient. Freeman Sotelo is a 78 y.o. female who presents today for constipated for 4 days although had a good clean out 5 days ago. Bleching, nauseated. Gas. Pain in left lower back wraps around to left pelvic area. Feels gas moving around. Feels like knots in there. Went to [facility] and got gas[facility], already takes antacid for vocal cords. Got a bottle of milk of mag to help her bowels move. Helped some but not all the way. Scared to eat because she gets a crmp. Last 3 mornings got up to urinate and vomits, just liquid. Hard to sleep because it hurts. Pain gets pretty severe at times, felt like knots, heard her gas. Uncontrolled DM, last a1c 8.0 4 months ago  Still on insulin and usual meds  Sugar up last 2 days  Ate bowl of cereal the other morning because wanted to eat something light, ate hamburger and fries that night  Yesterday got fish and greens.  Was able to eat 1.5 pieces of that  Colonoscopy 5 years ago normal, no diverticulosis either    Patient Active Problem List    Diagnosis Date Noted    Type 2 diabetes mellitus with nephropathy ([facility] [address] 01/13/2018    Type 2 diabetes mellitus without complication, with long-term current use of insulin ([facility] [address] 09/29/2017    Situational anxiety 09/29/2017    Dyslipidemia, goal LDL below 100 09/29/2017    ACP (advance care planning) 02/13/2017    Hypertension     Lichen sclerosus     Vitamin D deficiency 10/15/2014    Hyperlipidemia 03/07/2010    ANMOL (obstructive sleep apnea) 03/07/2010    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    Reflux esophagitis 03/07/2010    Degeneration of intervertebral disc, site unspecified 03/07/2010    Cervical spondylosis without myelopathy 03/07/2010    Contact dermatitis and other eczema, due to unspecified cause 03/07/2010    Allergic rhinitis 03/07/2010     Current Outpatient Prescriptions on File Prior to Visit   Medication Sig Dispense Refill    TOUJEO SOLOSTAR 300 unit/mL (1.5 mL) inpn INJECT 60 UNITS SUBCUTANEOUSLY ONCE A DAY  0    LORazepam (ATIVAN) 1 mg tablet TAKE 1 TABLET BY MOUTH 1/2 PRIOR TO MRI REPEAT IF NEEDED  0    guaiFENesin (MUCINEX) 1,200 mg Ta12 ER tablet Take 1,200 mg by mouth two (2) times a day.  furosemide (LASIX) 40 mg tablet Take 1 Tab by mouth daily. For swelling 30 Tab 1    diazePAM (VALIUM) 5 mg tablet Take 5 mg by mouth every eight (8) hours as needed (back pain).  albuterol (PROVENTIL HFA, VENTOLIN HFA, PROAIR HFA) 90 mcg/actuation inhaler Take 1-2 Puffs by inhalation every four (4) hours as needed for Wheezing. 1 Inhaler 5    latanoprost (XALATAN) 0.005 % ophthalmic solution PLACE 1 DROP IN BOTH EYES EVERY EVENING  0    valsartan (DIOVAN) 80 mg tablet Take 1 Tab by mouth daily.  atorvastatin (LIPITOR) 40 mg tablet Take 1 Tab by mouth daily. For cholesterol 30 Tab 5    BD INSULIN PEN NEEDLE UF MINI 31 gauge x 3/16\" ndle use four times a day for INSULIN INJECTIONS as directed  1    insulin aspart (NOVOLOG FLEXPEN) 100 unit/mL inpn 12 Units by SubCUTAneous route Before breakfast, lunch, and dinner.  ONETOUCH ULTRA TEST strip   0    halobetasol (ULTRAVATE) 0.05 % ointment   0    glimepiride (AMARYL) 4 mg tablet take 1 tablet every morning 30 Tab 0    metFORMIN (GLUCOPHAGE) 500 mg tablet Take 1,000 mg by mouth two (2) times daily (with meals). No current facility-administered medications on file prior to visit. Allergies   Allergen Reactions    Aspirin Nausea and Vomiting     Can take a coated aspirin.     Ciprocinonide Swelling    Crestor [Rosuvastatin] Myalgia    Sulfa (Sulfonamide Antibiotics) Hives     Past Medical History:   Diagnosis Date    Allergic rhinitis     DDD (degenerative disc disease)     thoracic spine    DJD (degenerative joint disease) of cervical spine     Dyslipidemia, goal LDL below 100 9/29/2017    Eczema     Hypertension     Lichen sclerosus     dx [facility]    ANMOL (obstructive sleep apnea)     CPAP    Other and unspecified hyperlipidemia     Reflux esophagitis     Situational anxiety 9/29/2017    Type II or unspecified type diabetes mellitus without mention of complication, not stated as uncontrolled      Past Surgical History:   Procedure Laterality Date    HX BACK SURGERY      x 3    HX COLONOSCOPY  2013    HX GYN      benign tumors removed from uterus    HX HEENT      sinus surgery for snoring    HX HIP REPLACEMENT  2004    rt    HX ORTHOPAEDIC Bilateral July 2013    hip replacement    HX TONSIL AND ADENOIDECTOMY      HX TUBAL LIGATION      PALATOPHAYNGOPLASTY  2002    [place] DCMPRN [address] [facility]/> LEVELS LUMBAR  2003    L3-4     Family History   Problem Relation Age of Onset    Cancer Maternal Grandfather      lung    Cancer Paternal Grandmother     Cancer Paternal Grandfather      colon    Heart Attack Mother     Dementia Father      alzheimers    Dementia Maternal Grandmother      alzheimers    Breast Cancer Paternal Aunt      Social History   Substance Use Topics    Smoking status: Never Smoker    Smokeless tobacco: Never Used    Alcohol use Yes      Comment: occasional      Social History     Social History Narrative          Review of Systems  GI: denies hematochezia   denies urinary pain or changes  Gen: denies fever that she knows of         Objective:     Vitals:    01/13/18 1057   BP: 137/78   Pulse: 99   Resp: 22   Temp: 98.2 °F (36.8 °C)   TempSrc: Oral   SpO2: 96%   Weight: 220 lb (99.8 kg)   Height: 5' 6\" (1.676 m)     Body mass index is 35.51 kg/(m^2).      General: stated age, obese and in NAD  Neck: supple, symmetrical, trachea midline, no adenopathy and thyroid: not enlarged, symmetric, no tenderness/mass/nodules  Lungs:  clear to auscultation w/o rales, rhonchi, wheezes w/normal effort and no use of accessory muscles of respiration   Heart: regular rate and rhythm, S1, S2 normal, no murmur, click, rub or gallop  Abdomen: soft, mildly tender LLQ only, no guarding or rebound, normal bowel sounds, no masses  Ext:  No edema noted. Lymph: no cervical adenopathy appreciated  Skin:  Normal. and no rash or abnormalities   Psych: alert and oriented to person, place, time and situation and Speech: appropriate quality, quantity and organization of sentences     Lab Results   Component Value Date/Time    Hemoglobin A1c 8.0 08/30/2017 03:31 PM    Hemoglobin A1c (POC) 9 11/11/2015 11:12 AM    Hemoglobin A1c, External 9.2 01/21/2016     Lab Results   Component Value Date/Time    Cholesterol, total 150 08/30/2017 03:31 PM    HDL Cholesterol 56 08/30/2017 03:31 PM    LDL, calculated 104 02/13/2017 11:44 AM    VLDL, calculated 24 02/13/2017 11:44 AM    Triglyceride 121 02/13/2017 11:44 AM    CHOL/HDL Ratio 3.5 08/30/2010 10:02 AM     Lab Results   Component Value Date/Time    Sodium 139 01/13/2018 11:32 AM    Potassium 5.7 01/13/2018 11:32 AM    Chloride 95 01/13/2018 11:32 AM    CO2 27 01/13/2018 11:32 AM    Anion gap 10 12/23/2014 04:04 PM    Glucose 208 01/13/2018 11:32 AM    BUN 14 01/13/2018 11:32 AM    Creatinine 1.14 01/13/2018 11:32 AM    BUN/Creatinine ratio 12 01/13/2018 11:32 AM    GFR est AA 58 01/13/2018 11:32 AM    GFR est non-AA 50 01/13/2018 11:32 AM    Calcium 9.8 01/13/2018 11:32 AM    Bilirubin, total 0.4 01/13/2018 11:32 AM    AST (SGOT) 13 01/13/2018 11:32 AM    Alk.  phosphatase 152 01/13/2018 11:32 AM    Protein, total 7.6 01/13/2018 11:32 AM    Albumin 4.3 01/13/2018 11:32 AM    Globulin 4.1 12/23/2014 04:04 PM    A-G Ratio 1.3 01/13/2018 11:32 AM    ALT (SGPT) 14 01/13/2018 11:32 AM     Lab Results   Component Value Date/Time    WBC 10.3 01/13/2018 11:32 AM    HGB 12.2 01/13/2018 11:32 AM    HCT 37.6 01/13/2018 11:32 AM    PLATELET 879 30/96/4070 11:32 AM MCV 75 01/13/2018 11:32 AM     Lab Results   Component Value Date/Time    TSH 3.670 11/11/2015 12:10 PM    T4, Free 1.05 11/11/2015 12:10 PM     Lab Results   Component Value Date/Time    VITAMIN D, 25-HYDROXY 29.6 12/14/2015 12:05 PM             Assessment/Plan:       ICD-10-CM ICD-9-CM    1. Left flank pain S36.8 461.98 METABOLIC PANEL, COMPREHENSIVE      CBC WITH AUTOMATED DIFF      AMB POC URINALYSIS DIP STICK AUTO W/O MICRO   2. Gas pain R14.1 787.3    3. Uncontrolled type 2 diabetes mellitus with diabetic nephropathy, with long-term current use of insulin ([facility]) U08.39 458.00 METABOLIC PANEL, COMPREHENSIVE    E11.65 583.81 CBC WITH AUTOMATED DIFF    Z79.4 V58.67 LIPID PANEL      HEMOGLOBIN A1C WITH EAG   4. Elevated TSH R94.6 794.5 TSH 3RD GENERATION       Orders Placed This Encounter    METABOLIC PANEL, COMPREHENSIVE    CBC WITH AUTOMATED DIFF    TSH 3RD GENERATION    LIPID PANEL    HEMOGLOBIN A1C WITH EAG    CKD REPORT    AMB POC URINALYSIS DIP STICK AUTO W/O MICRO    calcium carbonate (TUMS) 200 mg calcium (500 mg) chew    metroNIDAZOLE (FLAGYL) 500 mg tablet    ciprofloxacin HCl (CIPRO) 500 mg tablet    ondansetron (ZOFRAN ODT) 4 mg disintegrating tablet     Suspect diverticulitis  Doing stat labs  May need CT if not improving and advised to go to ER if worsening pain  Otherwise will try antibiotics this weekend  zofran prn for the nausea  Routine DM labs as well  Encouraged sugar control, diet    Discussed the diagnosis and plan and she expressed understanding. Follow-up Disposition:  Return if symptoms worsen or fail to improve.     Chintan Lopez MD

## 2018-01-14 LAB
ALBUMIN SERPL-MCNC: 4.3 G/DL (ref 3.6–4.8)
ALBUMIN/GLOB SERPL: 1.3 {RATIO} (ref 1.2–2.2)
ALP SERPL-CCNC: 152 IU/L (ref 39–117)
ALT SERPL-CCNC: 14 IU/L (ref 0–32)
AST SERPL-CCNC: 13 IU/L (ref 0–40)
BASOPHILS # BLD AUTO: 0 X10E3/UL (ref 0–0.2)
BASOPHILS NFR BLD AUTO: 0 %
BILIRUB SERPL-MCNC: 0.4 MG/DL (ref 0–1.2)
BUN SERPL-MCNC: 14 MG/DL (ref 8–27)
BUN/CREAT SERPL: 12 (ref 12–28)
CALCIUM SERPL-MCNC: 9.8 MG/DL (ref 8.7–10.3)
CHLORIDE SERPL-SCNC: 95 MMOL/L (ref 96–106)
CHOLEST SERPL-MCNC: 147 MG/DL (ref 100–199)
CO2 SERPL-SCNC: 27 MMOL/L (ref 18–29)
CREAT SERPL-MCNC: 1.14 MG/DL (ref 0.57–1)
EOSINOPHIL # BLD AUTO: 0.2 X10E3/UL (ref 0–0.4)
EOSINOPHIL NFR BLD AUTO: 2 %
ERYTHROCYTE [DISTWIDTH] IN BLOOD BY AUTOMATED COUNT: 16.5 % (ref 12.3–15.4)
EST. AVERAGE GLUCOSE BLD GHB EST-MCNC: 180 MG/DL
GLOBULIN SER CALC-MCNC: 3.3 G/DL (ref 1.5–4.5)
GLUCOSE SERPL-MCNC: 208 MG/DL (ref 65–99)
HBA1C MFR BLD: 7.9 % (ref 4.8–5.6)
HCT VFR BLD AUTO: 37.6 % (ref 34–46.6)
HDLC SERPL-MCNC: 49 MG/DL
HGB BLD-MCNC: 12.2 G/DL (ref 11.1–15.9)
INTERPRETATION, 910389: NORMAL
INTERPRETATION: NORMAL
LDLC SERPL CALC-MCNC: 80 MG/DL (ref 0–99)
LYMPHOCYTES # BLD AUTO: 2 X10E3/UL (ref 0.7–3.1)
LYMPHOCYTES NFR BLD AUTO: 19 %
MCH RBC QN AUTO: 24.3 PG (ref 26.6–33)
MCHC RBC AUTO-ENTMCNC: 32.4 G/DL (ref 31.5–35.7)
MCV RBC AUTO: 75 FL (ref 79–97)
MONOCYTES # BLD AUTO: 0.6 X10E3/UL (ref 0.1–0.9)
MONOCYTES NFR BLD AUTO: 6 %
NEUTROPHILS # BLD AUTO: 7.5 X10E3/UL (ref 1.4–7)
NEUTROPHILS NFR BLD AUTO: 73 %
PDF IMAGE, 910387: NORMAL
PLATELET # BLD AUTO: 335 X10E3/UL (ref 150–379)
POTASSIUM SERPL-SCNC: 5.7 MMOL/L (ref 3.5–5.2)
PROT SERPL-MCNC: 7.6 G/DL (ref 6–8.5)
RBC # BLD AUTO: 5.02 X10E6/UL (ref 3.77–5.28)
SODIUM SERPL-SCNC: 139 MMOL/L (ref 134–144)
TRIGL SERPL-MCNC: 91 MG/DL (ref 0–149)
TSH SERPL DL<=0.005 MIU/L-ACNC: 2.46 UIU/ML (ref 0.45–4.5)
VLDLC SERPL CALC-MCNC: 18 MG/DL (ref 5–40)
WBC # BLD AUTO: 10.3 X10E3/UL (ref 3.4–10.8)

## 2018-01-15 ENCOUNTER — HOSPITAL ENCOUNTER (OUTPATIENT)
Dept: LAB | Age: 68
Discharge: HOME | End: 2018-01-15
Payer: MEDICARE

## 2018-01-15 PROCEDURE — 36415 COLL VENOUS BLD VENIPUNCTURE: CPT

## 2018-01-15 PROCEDURE — 80048 BASIC METABOLIC PNL TOTAL CA: CPT

## 2018-01-15 NOTE — PROGRESS NOTES
Sent in letter:  Other labs came back showing that your diabetes is still running a little out of control (but slightly better from lats time. Cholesterol and thyroid were good. Please be sure to come recheck the potassium level, if you have not already done so, and to let me know if you aren't feeling better with the antibiotic.

## 2018-01-16 ENCOUNTER — TELEPHONE (OUTPATIENT)
Dept: FAMILY MEDICINE CLINIC | Age: 68
End: 2018-01-16

## 2018-01-17 ENCOUNTER — TELEPHONE (OUTPATIENT)
Dept: FAMILY MEDICINE CLINIC | Age: 68
End: 2018-01-17

## 2018-01-17 DIAGNOSIS — R10.9 LEFT LATERAL ABDOMINAL PAIN: Primary | ICD-10-CM

## 2018-01-17 NOTE — TELEPHONE ENCOUNTER
Called pt back ans she states that she is feeling some better. But still gassy, still having cramps. She had a BM on Sat and then one today after she took a laxative. She would like to have a ct scan to see what is going on. Advised that I will check with Dr Zaida James and get back with her.

## 2018-01-17 NOTE — TELEPHONE ENCOUNTER
----- Message from Shameka Cam sent at 1/16/2018  4:55 PM EST -----  Regarding: Dr. Joel Edward Telephone  Patient would like a call back regarding getting an appt for he Cat Scan.  Contact is [phone]

## 2018-01-18 NOTE — TELEPHONE ENCOUNTER
Let pt know that Dr Roberto ordered CT. I gave her the # for central scheduling if they don't call her today.

## 2018-01-23 ENCOUNTER — HOSPITAL ENCOUNTER (OUTPATIENT)
Dept: CT IMAGING | Age: 68
Discharge: HOME | End: 2018-01-23
Attending: FAMILY MEDICINE
Payer: MEDICARE

## 2018-01-23 DIAGNOSIS — R10.9 LEFT LATERAL ABDOMINAL PAIN: ICD-10-CM

## 2018-01-23 PROCEDURE — 74177 CT ABD & PELVIS W/CONTRAST: CPT

## 2018-01-23 PROCEDURE — 74011636320 HC RX REV CODE- 636/320: Performed by: FAMILY MEDICINE

## 2018-01-23 PROCEDURE — 74011000258 HC RX REV CODE- 258: Performed by: FAMILY MEDICINE

## 2018-01-23 RX ORDER — SODIUM CHLORIDE 0.9 % (FLUSH) 0.9 %
10 SYRINGE (ML) INJECTION
Status: COMPLETED | OUTPATIENT
Start: 2018-01-23 | End: 2018-01-23

## 2018-01-23 RX ADMIN — IOPAMIDOL 100 ML: 755 INJECTION, SOLUTION INTRAVENOUS at 16:35

## 2018-01-23 RX ADMIN — Medication 10 ML: at 16:36

## 2018-01-23 RX ADMIN — SODIUM CHLORIDE 100 ML: 900 INJECTION, SOLUTION INTRAVENOUS at 16:36

## 2018-01-24 ENCOUNTER — TELEPHONE (OUTPATIENT)
Dept: FAMILY MEDICINE CLINIC | Age: 68
End: 2018-01-24

## 2018-01-24 NOTE — PROGRESS NOTES
Please let her know CT is ok. They don't see diverticulitis now (although the antibiotic may have helped that). If she is still hurting I should probably see her again tomorrow.   Reynaldo Adame MD 1/24/2018 8:03 AM

## 2018-01-24 NOTE — TELEPHONE ENCOUNTER
Patient states she is still in a great deal of pain  Has to take a laxative to have a BM.  She is very surprised that nothing showed up on the CT Scan  Scheduled appointment here tomorrow at 2:30 pm

## 2018-01-24 NOTE — TELEPHONE ENCOUNTER
----- Message from Maria Luisa Ruff MD sent at 1/24/2018  8:03 AM EST -----  Please let her know CT is ok. They don't see diverticulitis now (although the antibiotic may have helped that). If she is still hurting I should probably see her again tomorrow.   Maria Luisa Ruff MD 1/24/2018 8:03 AM

## 2018-01-25 ENCOUNTER — OFFICE VISIT (OUTPATIENT)
Dept: FAMILY MEDICINE CLINIC | Age: 68
End: 2018-01-25

## 2018-01-25 VITALS
RESPIRATION RATE: 18 BRPM | DIASTOLIC BLOOD PRESSURE: 81 MMHG | WEIGHT: 217 LBS | TEMPERATURE: 98.6 F | HEIGHT: 66 IN | OXYGEN SATURATION: 96 % | HEART RATE: 98 BPM | BODY MASS INDEX: 34.87 KG/M2 | SYSTOLIC BLOOD PRESSURE: 131 MMHG

## 2018-01-25 DIAGNOSIS — R10.84 GENERALIZED ABDOMINAL PAIN: ICD-10-CM

## 2018-01-25 DIAGNOSIS — K59.00 CONSTIPATION, UNSPECIFIED CONSTIPATION TYPE: Primary | ICD-10-CM

## 2018-01-25 RX ORDER — POLYETHYLENE GLYCOL 3350, SODIUM SULFATE ANHYDROUS, SODIUM BICARBONATE, SODIUM CHLORIDE, POTASSIUM CHLORIDE 236; 22.74; 6.74; 5.86; 2.97 G/4L; G/4L; G/4L; G/4L; G/4L
4 POWDER, FOR SOLUTION ORAL
Qty: 4000 ML | Refills: 0 | Status: SHIPPED | OUTPATIENT
Start: 2018-01-25 | End: 2018-01-25

## 2018-01-25 NOTE — PATIENT INSTRUCTIONS
Constipation: Care Instructions  Your Care Instructions    Constipation means that you have a hard time passing stools (bowel movements). People pass stools from 3 times a day to once every 3 days. What is normal for you may be different. Constipation may occur with pain in the rectum and cramping. The pain may get worse when you try to pass stools. Sometimes there are small amounts of bright red blood on toilet paper or the surface of stools. This is because of enlarged veins near the rectum (hemorrhoids). A few changes in your diet and lifestyle may help you avoid ongoing constipation. Your doctor may also prescribe medicine to help loosen your stool. Some medicines can cause constipation. These include pain medicines and antidepressants. Tell your doctor about all the medicines you take. Your doctor may want to make a medicine change to ease your symptoms. Follow-up care is a key part of your treatment and safety. Be sure to make and go to all appointments, and call your doctor if you are having problems. It's also a good idea to know your test results and keep a list of the medicines you take. How can you care for yourself at home? · Drink plenty of fluids, enough so that your urine is light yellow or clear like water. If you have kidney, heart, or liver disease and have to limit fluids, talk with your doctor before you increase the amount of fluids you drink. · Include high-fiber foods in your diet each day. These include fruits, vegetables, beans, and whole grains. · Get at least 30 minutes of exercise on most days of the week. Walking is a good choice. You also may want to do other activities, such as running, swimming, cycling, or playing tennis or team sports. · Take a fiber supplement, such as Citrucel or Metamucil, every day. Read and follow all instructions on the label. · Schedule time each day for a bowel movement. A daily routine may help.  Take your time having your bowel movement. · Support your feet with a small step stool when you sit on the toilet. This helps flex your hips and places your pelvis in a squatting position. · Your doctor may recommend an over-the-counter laxative to relieve your constipation. Examples are Milk of Magnesia and MiraLax. Read and follow all instructions on the label. Do not use laxatives on a long-term basis. When should you call for help? Call your doctor now or seek immediate medical care if:  ? · You have new or worse belly pain. ? · You have new or worse nausea or vomiting. ? · You have blood in your stools. ? Watch closely for changes in your health, and be sure to contact your doctor if:  ? · Your constipation is getting worse. ? · You do not get better as expected. Where can you learn more? Go to http://vignesh-nini.info/. Enter [phone] in the search box to learn more about \"Constipation: Care Instructions. \"  Current as of: March 20, 2017  Content Version: 11.4  © 4265-2217 [email or web address]. Care instructions adapted under license by [employer] (which disclaims liability or warranty for this information). If you have questions about a medical condition or this instruction, always ask your healthcare professional. [address] any warranty or liability for your use of this information. Laxative, Bulk-forming (By mouth)   Treats constipation by helping you have a bowel movement. Brand Name(s): Bulk-K, Figueroa Psyllium, Citrucel, Citrucel Fiber Shake, Citrucel with Smartfiber, Equalactin, Evac, Fiber, Fiber Choice, Fiber Choice Plus Calcium, Fiber Choice Weight Management, Fiber Ease, Fiber Laxative, Fiber-Caps, Fiber-Lax   There may be other brand names for this medicine. When This Medicine Should Not Be Used: You should not use this medicine if you have ever had an allergic reaction to any bulk-forming laxative.  You should not use if you have intestinal blockage, severe stomach pain or fever, or rectal bleeding that you don't know the cause of. How to Use This Medicine:   Liquid, Powder, Granule, Tablet, Chewable Tablet, Delayed Release Tablet, Capsule, Packet, Wafer  · Your doctor will tell you how much medicine to use. Do not use more than directed. · Follow the instructions on the medicine label if you are using this medicine without a prescription. · Take each dose with at least 8 ounces (1 cup) of water. You could choke if you do not drink enough water when you take this medicine. Drink at least 6 to 8 full glasses of liquid every day to help soften your stools (bowel movements). · Mix the powder or granules with at least 8 ounces (1 cup) of water or other liquid. Drink this mixture right away. Do not mix with carbonated beverages, such as soda pop or club soda. Read the directions on the package label if you want to use a liquid other than water, because you can use different liquids for different brands of laxatives. Do not swallow the powder or granules dry. · Measure the oral liquid medicine with a marked measuring spoon, oral syringe, or medicine cup. You might need to shake the oral liquid before measuring your dose. · Do not chew, crush, or break the delayed-release tablet. If a dose is missed:   · Take a dose as soon as you remember. If it is almost time for your next dose, wait until then and take a regular dose. Do not take extra medicine to make up for a missed dose. How to Store and Dispose of This Medicine:   · Store the medicine in a closed container at room temperature, away from heat, moisture, and direct light. Do not freeze the oral liquid. · Ask your pharmacist, doctor, or health caregiver about the best way to dispose of any outdated medicine or medicine no longer needed. · Keep all medicine out of the reach of children. Never share your medicine with anyone.   Drugs and Foods to Avoid:   Ask your doctor or pharmacist before using any other medicine, including over-the-counter medicines, vitamins, and herbal products. · Make sure your doctor knows if you are also using tetracycline, a blood thinner (Coumadin®), digitalis (Lanoxin®), or salicylates (aspirin). Warnings While Using This Medicine:   · Check with your doctor before using if you are pregnant, or if you have diabetes, high blood pressure, heart disease, or trouble swallowing. · This medicine might contain phenylalanine (aspartame). This is only a concern if you have a disorder called phenylketonuria (a problem with amino acids). Talk to your doctor before using this medicine. · Laxatives should not be given to children under 10years of age unless ordered by a doctor. · Check with your doctor if you have sudden changes in bowel habits that last for more than 2 weeks. · You should not use this laxative for longer than 1 week unless approved by your doctor. Laxatives may be habit-forming and can harm your bowels if you use them too long. Possible Side Effects While Using This Medicine:   Call your doctor right away if you notice any of these side effects:  · Skin rash  · Itchy skin  · Trouble breathing  · Severe constipation or abdominal pain  · Trouble swallowing  If you notice these less serious side effects, talk with your doctor:   · Loose stools  If you notice other side effects that you think are caused by this medicine, tell your doctor. Call your doctor for medical advice about side effects. You may report side effects to FDA at 3-240-FDA-9761  © 2017 [facility] Information is for End User's use only and may not be sold, redistributed or otherwise used for commercial purposes. The above information is an  only. It is not intended as medical advice for individual conditions or treatments. Talk to your doctor, nurse or pharmacist before following any medical regimen to see if it is safe and effective for you.       I think your pain is likely all due to constipation at this point  Use the bowel prep until your stool is starting to get clear and you feel cleaned out  To keep the constipation from coming back eat plenty of veggies, drink lots of water, and take a fiber supplement daily (benefiber or fiber gummies, for example)  Let me know if that doesn't get rid of your pain so I can refer you for a repeat colonoscopy

## 2018-01-25 NOTE — MR AVS SNAPSHOT
[address] 
[phone] Patient: Magi Hernández MRN: [id number] [place]:[birth year] Visit Information Date & Time Provider Department Dept. Phone Encounter #  
 1/25/2018  2:30 PM Rudolph Francisco, [address] [facility] [phone] [id number] Follow-up Instructions Return in about 2 weeks (around 2/8/2018) for Annual Wellness Visit. Upcoming Health Maintenance Date Due MICROALBUMIN Q1 2/13/2018 MEDICARE YEARLY EXAM 2/14/2018 FOOT EXAM Q1 5/3/2018 HEMOGLOBIN A1C Q6M 7/13/2018 EYE EXAM RETINAL OR DILATED Q1 11/27/2018 LIPID PANEL Q1 1/13/2019 BREAST CANCER SCRN MAMMOGRAM 2/27/2019 GLAUCOMA SCREENING Q2Y 11/27/2019 COLONOSCOPY 2/11/2023 DTaP/Tdap/Td series (2 - Td) 2/13/2027 Allergies as of 1/25/2018  Review Complete On: 1/25/2018 By: Rudolph Francisco MD  
  
 Severity Noted Reaction Type Reactions Aspirin  03/06/2010    Nausea and Vomiting Can take a coated aspirin. Ciprocinonide  03/06/2010    Swelling Crestor [Rosuvastatin]  03/06/2010    Myalgia Sulfa (Sulfonamide Antibiotics)  03/06/2010    Hives Current Immunizations  Reviewed on 10/8/2012 Name Date Pneumococcal Conjugate (PCV-13) 5/3/2017 dT Vaccine 10/25/2000 Not reviewed this visit You Were Diagnosed With   
  
 Codes Comments Constipation, unspecified constipation type    -  Primary ICD-10-CM: K59.00 ICD-9-CM: 564.00 Generalized abdominal pain     ICD-10-CM: R10.84 ICD-9-CM: 789.07 Vitals BP Pulse Temp Resp Height(growth percentile) Weight(growth percentile) 131/81 (BP 1 Location: Left arm, BP Patient Position: Sitting) 98 98.6 °F (37 °C) (Oral) 18 5' 6\" (1.676 m) 217 lb (98.4 kg) LMP SpO2 BMI OB Status Smoking Status 09/23/2009 96% 35.02 kg/m2 Postmenopausal Never Smoker Vitals History BMI and BSA Data Body Mass Index Body Surface Area 35.02 kg/m 2 2.14 m 2 Preferred [facility]acy [facility] [address] [phone] Your Updated Medication List  
  
   
This list is accurate as of: 1/25/18  3:22 PM.  Always use your most recent med list.  
  
  
  
  
 albuterol 90 mcg/actuation inhaler Commonly known as:  PROVENTIL HFA, VENTOLIN HFA, PROAIR HFA Take 1-2 Puffs by inhalation every four (4) hours as needed for Wheezing. atorvastatin 40 mg tablet Commonly known as:  LIPITOR Take 1 Tab by mouth daily. For cholesterol BD INSULIN PEN NEEDLE UF MINI 31 gauge x 3/16\" Ndle Generic drug:  Insulin Needles (Disposable)  
use four times a day for INSULIN INJECTIONS as directed  
  
 calcium carbonate 200 mg calcium (500 mg) Chew Commonly known as:  TUMS Take 1 Tab by mouth daily. glimepiride 4 mg tablet Commonly known as:  AMARYL  
take 1 tablet every morning  
  
 halobetasol 0.05 % ointment Commonly known as:  ULTRAVATE  
  
 latanoprost 0.005 % ophthalmic solution Commonly known as:  XALATAN  
PLACE 1 DROP IN BOTH EYES EVERY EVENING  
  
 metFORMIN 500 mg tablet Commonly known as:  GLUCOPHAGE Take 1,000 mg by mouth two (2) times daily (with meals). NovoLOG Flexpen 100 unit/mL Inpn Generic drug:  insulin aspart 12 Units by SubCUTAneous route Before breakfast, lunch, and dinner. ONETOUCH ULTRA TEST strip Generic drug:  glucose blood VI test strips PEG 3350-Electrolytes 236-22.74-6.74 -5.86 gram suspension Commonly known as:  GO-LYTELY Take 4,000 mL by mouth now for 1 dose. TOUJEO SOLOSTAR 300 unit/mL (1.5 mL) Inpn Generic drug:  insulin glargine INJECT 60 UNITS SUBCUTANEOUSLY ONCE A DAY  
  
 valsartan 80 mg tablet Commonly known as:  DIOVAN Take 1 Tab by mouth daily. Prescriptions Sent to Pharmacy Refills PEG 3350-Electrolytes (GO-LYTELY) 236-22.74-6.74 -5.86 gram suspension 0 Sig: Take 4,000 mL by mouth now for 1 dose. Class: Normal  
 [facility]acy: [facility]  #: [phone] Route: Oral  
  
Follow-up Instructions Return in about 2 weeks (around 2/8/2018) for Annual Wellness Visit. Patient Instructions Constipation: Care Instructions Your Care Instructions Constipation means that you have a hard time passing stools (bowel movements). People pass stools from 3 times a day to once every 3 days. What is normal for you may be different. Constipation may occur with pain in the rectum and cramping. The pain may get worse when you try to pass stools. Sometimes there are small amounts of bright red blood on toilet paper or the surface of stools. This is because of enlarged veins near the rectum (hemorrhoids). A few changes in your diet and lifestyle may help you avoid ongoing constipation. Your doctor may also prescribe medicine to help loosen your stool. Some medicines can cause constipation. These include pain medicines and antidepressants. Tell your doctor about all the medicines you take. Your doctor may want to make a medicine change to ease your symptoms. Follow-up care is a key part of your treatment and safety. Be sure to make and go to all appointments, and call your doctor if you are having problems. It's also a good idea to know your test results and keep a list of the medicines you take. How can you care for yourself at home? · Drink plenty of fluids, enough so that your urine is light yellow or clear like water. If you have kidney, heart, or liver disease and have to limit fluids, talk with your doctor before you increase the amount of fluids you drink. · Include high-fiber foods in your diet each day. These include fruits, vegetables, beans, and whole grains. · Get at least 30 minutes of exercise on most days of the week. Walking is a good choice. You also may want to do other activities, such as running, swimming, cycling, or playing tennis or team sports. · Take a fiber supplement, such as Citrucel or Metamucil, every day. Read and follow all instructions on the label. · Schedule time each day for a bowel movement. A daily routine may help. Take your time having your bowel movement. · Support your feet with a small step stool when you sit on the toilet. This helps flex your hips and places your pelvis in a squatting position. · Your doctor may recommend an over-the-counter laxative to relieve your constipation. Examples are Milk of Magnesia and MiraLax. Read and follow all instructions on the label. Do not use laxatives on a long-term basis. When should you call for help? Call your doctor now or seek immediate medical care if: 
? · You have new or worse belly pain. ? · You have new or worse nausea or vomiting. ? · You have blood in your stools. ? Watch closely for changes in your health, and be sure to contact your doctor if: 
? · Your constipation is getting worse. ? · You do not get better as expected. Where can you learn more? Go to http://vignesh-nini.info/. Enter 21 [phone] in the search box to learn more about \"Constipation: Care Instructions. \" Current as of: March 20, 2017 Content Version: 11.4 © 8613-6351 [email or web address]. Care instructions adapted under license by [employer] (which disclaims liability or warranty for this information). If you have questions about a medical condition or this instruction, always ask your healthcare professional. [place] [address] any warranty or liability for your use of this information. Laxative, Bulk-forming (By mouth) Treats constipation by helping you have a bowel movement.   
Brand Name(s): Bulk-K, Figueroa Psyllium, Citrucel, Citrucel Fiber Shake, Citrucel with Smartfiber, Equalactin, Evac, Fiber, Fiber Choice, Fiber Choice Plus Calcium, Fiber Choice Weight Management, Fiber Ease, Fiber Laxative, Fiber-Caps, Fiber-Lax There may be other brand names for this medicine. When This Medicine Should Not Be Used: You should not use this medicine if you have ever had an allergic reaction to any bulk-forming laxative. You should not use if you have intestinal blockage, severe stomach pain or fever, or rectal bleeding that you don't know the cause of. How to Use This Medicine:  
Liquid, Powder, Granule, Tablet, Chewable Tablet, Delayed Release Tablet, Capsule, Packet, Wafer · Your doctor will tell you how much medicine to use. Do not use more than directed. · Follow the instructions on the medicine label if you are using this medicine without a prescription. · Take each dose with at least 8 ounces (1 cup) of water. You could choke if you do not drink enough water when you take this medicine. Drink at least 6 to 8 full glasses of liquid every day to help soften your stools (bowel movements). · Mix the powder or granules with at least 8 ounces (1 cup) of water or other liquid. Drink this mixture right away. Do not mix with carbonated beverages, such as soda pop or club soda. Read the directions on the package label if you want to use a liquid other than water, because you can use different liquids for different brands of laxatives. Do not swallow the powder or granules dry. · Measure the oral liquid medicine with a marked measuring spoon, oral syringe, or medicine cup. You might need to shake the oral liquid before measuring your dose. · Do not chew, crush, or break the delayed-release tablet. If a dose is missed: · Take a dose as soon as you remember. If it is almost time for your next dose, wait until then and take a regular dose. Do not take extra medicine to make up for a missed dose. How to Store and Dispose of This Medicine: · Store the medicine in a closed container at room temperature, away from heat, moisture, and direct light. Do not freeze the oral liquid. · Ask your pharmacist, doctor, or health caregiver about the best way to dispose of any outdated medicine or medicine no longer needed. · Keep all medicine out of the reach of children. Never share your medicine with anyone. Drugs and Foods to Avoid: Ask your doctor or pharmacist before using any other medicine, including over-the-counter medicines, vitamins, and herbal products. · Make sure your doctor knows if you are also using tetracycline, a blood thinner (Coumadin®), digitalis (Lanoxin®), or salicylates (aspirin). Warnings While Using This Medicine: · Check with your doctor before using if you are pregnant, or if you have diabetes, high blood pressure, heart disease, or trouble swallowing. · This medicine might contain phenylalanine (aspartame). This is only a concern if you have a disorder called phenylketonuria (a problem with amino acids). Talk to your doctor before using this medicine. · Laxatives should not be given to children under 10years of age unless ordered by a doctor. · Check with your doctor if you have sudden changes in bowel habits that last for more than 2 weeks. · You should not use this laxative for longer than 1 week unless approved by your doctor. Laxatives may be habit-forming and can harm your bowels if you use them too long. Possible Side Effects While Using This Medicine:  
Call your doctor right away if you notice any of these side effects: 
· Skin rash · Itchy skin · Trouble breathing · Severe constipation or abdominal pain · Trouble swallowing If you notice these less serious side effects, talk with your doctor: · Loose stools If you notice other side effects that you think are caused by this medicine, tell your doctor. Call your doctor for medical advice about side effects.  You may report side effects to FDA at 1-800-FDA-1088 © 2017 [facility] Information is for End User's use only and may not be sold, redistributed or otherwise used for commercial purposes. The above information is an  only. It is not intended as medical advice for individual conditions or treatments. Talk to your doctor, nurse or pharmacist before following any medical regimen to see if it is safe and effective for you. I think your pain is likely all due to constipation at this point Use the bowel prep until your stool is starting to get clear and you feel cleaned out To keep the constipation from coming back eat plenty of veggies, drink lots of water, and take a fiber supplement daily (benefiber or fiber gummies, for example) Let me know if that doesn't get rid of your pain so I can refer you for a repeat colonoscopy Introducing [facility]! Kareen Carbone introduces [employer] patient portal. Now you can access parts of your medical record, email your doctor's office, and request medication refills online. 1. In your internet browser, go to [email or web address] 2. Click on the First Time User? Click Here link in the Sign In box. You will see the New Member Sign Up page. 3. Enter your [employer] Access Code exactly as it appears below. You will not need to use this code after youve completed the sign-up process. If you do not sign up before the expiration date, you must request a new code. · [employer] Access Code: [email or web address] Expires: 4/13/2018 11:25 AM 
 
4. Enter the last four digits of your Social Security Number (xxxx) and Date of Birth (mm/dd/yyyy) as indicated and click Submit. You will be taken to the next sign-up page. 5. Create a [employer] ID. This will be your [employer] login ID and cannot be changed, so think of one that is secure and easy to remember. 6. Create a [employer] password. You can change your password at any time. 7. Enter your Password Reset Question and Answer. This can be used at a later time if you forget your password. 8. Enter your e-mail address. You will receive e-mail notification when new information is available in [address] 9. Click Sign Up. You can now view and download portions of your medical record. 10. Click the Download Summary menu link to download a portable copy of your medical information. If you have questions, please visit the Frequently Asked Questions section of the [employer] website. Remember, [employer] is NOT to be used for urgent needs. For medical emergencies, dial 911. Now available from your iPhone and Android! Please provide this summary of care documentation to your next provider. Your primary care clinician is listed as [place] [address], [facility]/s . If you have any questions after today's visit, please call [phone].

## 2018-01-25 NOTE — PROGRESS NOTES
Chief Complaint   Patient presents with    Constipation     continues to have issues       Reviewed Record in preparation for visit and have obtained necessary documentation. Identified pt with two pt identifiers (Name @ )    Health Maintenance Due   Topic    Influenza Age 5 to Adult     MICROALBUMIN Q1          1. Have you been to the ER, urgent care clinic since your last visit? Hospitalized since your last visit? No    2. Have you seen or consulted any other health care providers outside of the [address] [place] [address] since your last visit? Include any pap smears or colon screening.  No

## 2018-01-25 NOTE — PROGRESS NOTES
Sacha Kenyon [address] [facility] [address]. [place], [address]  [phone]             Date of visit: 1/25/18   Subjective:      History obtained from:  the patient. Jossue Domínguez is a 78 y.o. female who presents today for continues to have LLQ pain vishnu in middle of the night wlil get cramps. Will feel better after a BM. Feels better for a while but then if she eats anything will get cramps, gas, belching. I had treated her for possible diverticulitis with antibiotics  She wasn't getting better so did CT that showed diverticulosis but no diverticulitis  Has had this once before and had a good clean out and that was the oksana of it    Stools are ribbons, thin  Taking Garcia MOM, taking 30mL most days but not every day.   Last colonoscopy normal 2013  Antibiotics might have helped her the first day only  Sugar yesterday was 113, has been pretty good  Eating some jello (not sugar free)    After the CT had not eaten all day so went to [employer]  Got fried shimp, fish, carrots, greens, yams, deviled egg, some fruit  Had a BM (from the dye) but did not clean her out    Drinking more water, has to force herself, get it cold  Can go back to peach oatmeal (doens't like oatmeal much so couldn't do plain)    Patient Active Problem List    Diagnosis Date Noted    Type 2 diabetes mellitus with nephropathy ([address] 01/13/2018    Type 2 diabetes mellitus without complication, with long-term current use of insulin ([address] 09/29/2017    Situational anxiety 09/29/2017    Dyslipidemia, goal LDL below 100 09/29/2017    ACP (advance care planning) 02/13/2017    Hypertension     Lichen sclerosus     Vitamin D deficiency 10/15/2014    Hyperlipidemia 03/07/2010    ANMLO (obstructive sleep apnea) 03/07/2010    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    Reflux esophagitis 03/07/2010    Degeneration of intervertebral disc, site unspecified 03/07/2010    Cervical spondylosis without myelopathy 03/07/2010    Contact dermatitis and other eczema, due to unspecified cause 03/07/2010    Allergic rhinitis 03/07/2010     Current Outpatient Prescriptions on File Prior to Visit   Medication Sig Dispense Refill    calcium carbonate (TUMS) 200 mg calcium (500 mg) chew Take 1 Tab by mouth daily.  TOUJEO SOLOSTAR 300 unit/mL (1.5 mL) inpn INJECT 60 UNITS SUBCUTANEOUSLY ONCE A DAY  0    albuterol (PROVENTIL HFA, VENTOLIN HFA, PROAIR HFA) 90 mcg/actuation inhaler Take 1-2 Puffs by inhalation every four (4) hours as needed for Wheezing. 1 Inhaler 5    latanoprost (XALATAN) 0.005 % ophthalmic solution PLACE 1 DROP IN BOTH EYES EVERY EVENING  0    valsartan (DIOVAN) 80 mg tablet Take 1 Tab by mouth daily.  atorvastatin (LIPITOR) 40 mg tablet Take 1 Tab by mouth daily. For cholesterol 30 Tab 5    BD INSULIN PEN NEEDLE UF MINI 31 gauge x 3/16\" ndle use four times a day for INSULIN INJECTIONS as directed  1    insulin aspart (NOVOLOG FLEXPEN) 100 unit/mL inpn 12 Units by SubCUTAneous route Before breakfast, lunch, and dinner.  ONETOUCH ULTRA TEST strip   0    halobetasol (ULTRAVATE) 0.05 % ointment   0    glimepiride (AMARYL) 4 mg tablet take 1 tablet every morning 30 Tab 0    metFORMIN (GLUCOPHAGE) 500 mg tablet Take 1,000 mg by mouth two (2) times daily (with meals). No current facility-administered medications on file prior to visit. Allergies   Allergen Reactions    Aspirin Nausea and Vomiting     Can take a coated aspirin.     Ciprocinonide Swelling    Crestor [Rosuvastatin] Myalgia    Sulfa (Sulfonamide Antibiotics) Hives     Past Medical History:   Diagnosis Date    Allergic rhinitis     DDD (degenerative disc disease)     thoracic spine    DJD (degenerative joint disease) of cervical spine     Dyslipidemia, goal LDL below 100 9/29/2017    Eczema     Hypertension     Lichen sclerosus     [facility]    ANMOL (obstructive sleep apnea)     CPAP    Other and unspecified hyperlipidemia     Reflux esophagitis     Situational anxiety 9/29/2017    Type II or unspecified type diabetes mellitus without mention of complication, not stated as uncontrolled      Past Surgical History:   Procedure Laterality Date    HX BACK SURGERY      x 3    HX COLONOSCOPY  2013    HX GYN      benign tumors removed from uterus    HX HEENT      sinus surgery for snoring    HX HIP REPLACEMENT  2004    rt    HX ORTHOPAEDIC Bilateral July 2013    hip replacement    HX TONSIL AND ADENOIDECTOMY      HX TUBAL LIGATION      PALATOPHAYNGOPLASTY  2002    NV DCMPRN [address] [facility] [address]/> LEVELS LUMBAR  2003    L3-4     Family History   Problem Relation Age of Onset    Cancer Maternal Grandfather      lung    Cancer Paternal Grandmother     Cancer Paternal Grandfather      colon    Heart Attack Mother     Dementia Father      alzheimers    Dementia Maternal Grandmother      alzheimers    Breast Cancer Paternal Aunt      Social History   Substance Use Topics    Smoking status: Never Smoker    Smokeless tobacco: Never Used    Alcohol use Yes      Comment: occasional      Social History     Social History Narrative          Review of Systems  Gen: denies fever    GI: denies hematochezia   denies urinary pain        Objective:     Vitals:    01/25/18 1453   BP: 131/81   Pulse: 98   Resp: 18   Temp: 98.6 °F (37 °C)   TempSrc: Oral   SpO2: 96%   Weight: 217 lb (98.4 kg)   Height: 5' 6\" (1.676 m)     Body mass index is 35.02 kg/(m^2).      General: stated age, obese and in NAD  Neck: supple, symmetrical, trachea midline, no adenopathy and thyroid: not enlarged, symmetric, no tenderness/mass/nodules  Lungs:  clear to auscultation w/o rales, rhonchi, wheezes w/normal effort and no use of accessory muscles of respiration   Heart: regular rate and rhythm, S1, S2 normal, no murmur, click, rub or gallop  Abdomen: soft, only mildly tender LLQ, no guarding or rebound, no masses although exam limited by obesity  Ext:  No edema noted. Lymph: no cervical adenopathy appreciated  Skin:  Normal. and no rash or abnormalities   Psych: alert and oriented to person, place, time and situation and Speech: appropriate quality, quantity and organization of sentences     Lab Results   Component Value Date/Time    Hemoglobin A1c 7.9 01/13/2018 11:32 AM    Hemoglobin A1c (POC) 9 11/11/2015 11:12 AM    Hemoglobin A1c, External 9.2 01/21/2016     Lab Results   Component Value Date/Time    Cholesterol, total 147 01/13/2018 11:32 AM    HDL Cholesterol 49 01/13/2018 11:32 AM    LDL, calculated 80 01/13/2018 11:32 AM    VLDL, calculated 18 01/13/2018 11:32 AM    Triglyceride 91 01/13/2018 11:32 AM    CHOL/HDL Ratio 3.5 08/30/2010 10:02 AM     Lab Results   Component Value Date/Time    Sodium 138 01/15/2018 05:48 PM    Potassium 4.7 01/15/2018 05:48 PM    Chloride 96 01/15/2018 05:48 PM    CO2 28 01/15/2018 05:48 PM    Anion gap 10 12/23/2014 04:04 PM    Glucose 145 01/15/2018 05:48 PM    BUN 10 01/15/2018 05:48 PM    Creatinine 1.07 01/15/2018 05:48 PM    BUN/Creatinine ratio 9 01/15/2018 05:48 PM    GFR est AA 63 01/15/2018 05:48 PM    GFR est non-AA 54 01/15/2018 05:48 PM    Calcium 9.7 01/15/2018 05:48 PM    Bilirubin, total 0.4 01/13/2018 11:32 AM    AST (SGOT) 13 01/13/2018 11:32 AM    Alk.  phosphatase 152 01/13/2018 11:32 AM    Protein, total 7.6 01/13/2018 11:32 AM    Albumin 4.3 01/13/2018 11:32 AM    Globulin 4.1 12/23/2014 04:04 PM    A-G Ratio 1.3 01/13/2018 11:32 AM    ALT (SGPT) 14 01/13/2018 11:32 AM     Lab Results   Component Value Date/Time    WBC 10.3 01/13/2018 11:32 AM    HGB 12.2 01/13/2018 11:32 AM    HCT 37.6 01/13/2018 11:32 AM    PLATELET 227 04/14/8658 11:32 AM    MCV 75 01/13/2018 11:32 AM     Lab Results   Component Value Date/Time    TSH 2.460 01/13/2018 11:32 AM    T4, Free 1.05 11/11/2015 12:10 PM     Lab Results   Component Value Date/Time    VITAMIN D, 25-HYDROXY 29.6 12/14/2015 12:05 PM             Assessment/Plan:       ICD-10-CM ICD-9-CM    1. Constipation, unspecified constipation type K59.00 564.00    2. Generalized abdominal pain R10.84 789.07      Patient Instructions     I think your pain is likely all due to constipation at this point  Use the bowel prep until your stool is starting to get clear and you feel cleaned out  To keep the constipation from coming back eat plenty of veggies, drink lots of water, and take a fiber supplement daily (benefiber or fiber gummies, for example)  Let me know if that doesn't get rid of your pain so I can refer you for a repeat colonoscopy    Orders Placed This Encounter    PEG 3350-Electrolytes (GO-LYTELY) 236-22.74-6.74 -5.86 gram suspension     See above plan  Also discussed trying enema, she can if she wants, but I think the bowel prep will be enough  Asked her to update me Friday  Still possible she may have had diverticulitis but finished antibiotics for that and no infection seen on CT. Discussed the diagnosis and plan and she expressed understanding. Follow-up Disposition:  Return in about 2 weeks (around 2/8/2018) for Annual Wellness Visit.   And recheck    Yecenia Carbajal MD

## 2018-02-15 ENCOUNTER — OFFICE VISIT (OUTPATIENT)
Dept: FAMILY MEDICINE CLINIC | Age: 68
End: 2018-02-15

## 2018-02-15 VITALS
WEIGHT: 220.8 LBS | DIASTOLIC BLOOD PRESSURE: 79 MMHG | TEMPERATURE: 98.6 F | HEART RATE: 96 BPM | BODY MASS INDEX: 35.48 KG/M2 | SYSTOLIC BLOOD PRESSURE: 138 MMHG | RESPIRATION RATE: 18 BRPM | OXYGEN SATURATION: 98 % | HEIGHT: 66 IN

## 2018-02-15 DIAGNOSIS — F41.8 SITUATIONAL ANXIETY: ICD-10-CM

## 2018-02-15 DIAGNOSIS — E11.9 TYPE 2 DIABETES MELLITUS WITHOUT COMPLICATION, WITH LONG-TERM CURRENT USE OF INSULIN (HCC): ICD-10-CM

## 2018-02-15 DIAGNOSIS — E11.21 TYPE 2 DIABETES MELLITUS WITH NEPHROPATHY (HCC): ICD-10-CM

## 2018-02-15 DIAGNOSIS — Z00.00 MEDICARE ANNUAL WELLNESS VISIT, SUBSEQUENT: Primary | ICD-10-CM

## 2018-02-15 DIAGNOSIS — K59.00 CONSTIPATION, UNSPECIFIED CONSTIPATION TYPE: ICD-10-CM

## 2018-02-15 DIAGNOSIS — Z79.4 TYPE 2 DIABETES MELLITUS WITHOUT COMPLICATION, WITH LONG-TERM CURRENT USE OF INSULIN (HCC): ICD-10-CM

## 2018-02-15 DIAGNOSIS — E66.9 OBESITY (BMI 30-39.9): ICD-10-CM

## 2018-02-15 DIAGNOSIS — Z12.11 COLON CANCER SCREENING: ICD-10-CM

## 2018-02-15 DIAGNOSIS — I10 ESSENTIAL HYPERTENSION: ICD-10-CM

## 2018-02-15 DIAGNOSIS — G47.33 OSA (OBSTRUCTIVE SLEEP APNEA): ICD-10-CM

## 2018-02-15 RX ORDER — POLYETHYLENE GLYCOL 3350 17 G/17G
17 POWDER, FOR SOLUTION ORAL
COMMUNITY

## 2018-02-15 NOTE — MR AVS SNAPSHOT
[address] 
[phone] Patient: Luis [facility] MRN: [id number] PDZ:[id number] Visit Information Date & Time Provider Department Dept. Phone Encounter #  
 2/15/2018  2:45 PM Marixa Forman, [address] [facility] [phone] [id number] Follow-up Instructions Return in about 6 months (around 8/15/2018) for Follow up. Upcoming Health Maintenance Date Due  
 MEDICARE YEARLY EXAM 2/14/2018 FOOT EXAM Q1 5/3/2018 HEMOGLOBIN A1C Q6M 8/8/2018 EYE EXAM RETINAL OR DILATED Q1 11/27/2018 LIPID PANEL Q1 1/13/2019 MICROALBUMIN Q1 2/8/2019 BREAST CANCER SCRN MAMMOGRAM 2/27/2019 GLAUCOMA SCREENING Q2Y 11/27/2019 COLONOSCOPY 2/11/2023 DTaP/Tdap/Td series (2 - Td) 2/13/2027 Allergies as of 2/15/2018  Review Complete On: 2/15/2018 By: Marixa Forman MD  
  
 Severity Noted Reaction Type Reactions Aspirin  03/06/2010    Nausea and Vomiting Can take a coated aspirin. Ciprocinonide  03/06/2010    Swelling Crestor [Rosuvastatin]  03/06/2010    Myalgia Sulfa (Sulfonamide Antibiotics)  03/06/2010    Hives Current Immunizations  Reviewed on 2/8/2018 Name Date Pneumococcal Conjugate (PCV-13) 5/3/2017 dT Vaccine 10/25/2000 Not reviewed this visit You Were Diagnosed With   
  
 Codes Comments Medicare annual wellness visit, initial    -  Primary ICD-10-CM: Z00.00 ICD-9-CM: V70.0 Constipation, unspecified constipation type     ICD-10-CM: K59.00 ICD-9-CM: 564.00 Obesity (BMI 30-39. 9)     ICD-10-CM: E66.9 ICD-9-CM: 278.00   
 ANMOL (obstructive sleep apnea)     ICD-10-CM: G47.33 
ICD-9-CM: 327.23 Type 2 diabetes mellitus without complication, with long-term current use of insulin (HCC)     ICD-10-CM: E11.9, Z79.4 ICD-9-CM: 250.00, V58.67 Colon cancer screening     ICD-10-CM: Z12.11 ICD-9-CM: V76.51   
 Essential hypertension     ICD-10-CM: I10 
ICD-9-CM: 401.9 Situational anxiety     ICD-10-CM: F41.8 ICD-9-CM: 300.09 Vitals BP Pulse Temp Resp Height(growth percentile) Weight(growth percentile) 138/79 (BP 1 Location: Right arm, BP Patient Position: Sitting) 96 98.6 °F (37 °C) (Oral) 18 5' 6\" (1.676 m) 220 lb 12.8 oz (100.2 kg) LMP SpO2 BMI OB Status Smoking Status 09/23/2009 98% 35.64 kg/m2 Postmenopausal Never Smoker Vitals History BMI and BSA Data Body Mass Index Body Surface Area  
 35.64 kg/m 2 2.16 m 2 Preferred Pharmacy [facility] Name Phone [facility] [address] [phone] Your Updated Medication List  
  
   
This list is accurate as of: 2/15/18  3:56 PM.  Always use your most recent med list.  
  
  
  
  
 albuterol 90 mcg/actuation inhaler Commonly known as:  PROVENTIL HFA, VENTOLIN HFA, PROAIR HFA Take 1-2 Puffs by inhalation every four (4) hours as needed for Wheezing. atorvastatin 40 mg tablet Commonly known as:  LIPITOR Take 1 Tab by mouth daily. For cholesterol BD INSULIN PEN NEEDLE UF MINI 31 gauge x 3/16\" Ndle Generic drug:  Insulin Needles (Disposable)  
use four times a day for INSULIN INJECTIONS as directed  
  
 calcium carbonate 200 mg calcium (500 mg) Chew Commonly known as:  TUMS Take 1 Tab by mouth daily. diphtheria-pertussis (acellular)-tetanus 2.5-8-5 Lf-mcg-Lf/0.5mL Susp susp Commonly known as:  BOOSTRIX TDAP  
0.5 mL by IntraMUSCular route once for 1 dose. glimepiride 4 mg tablet Commonly known as:  AMARYL  
take 1 tablet every morning  
  
 halobetasol 0.05 % ointment Commonly known as:  ULTRAVATE  
  
 latanoprost 0.005 % ophthalmic solution Commonly known as:  XALATAN  
PLACE 1 DROP IN BOTH EYES EVERY EVENING  
  
 metFORMIN 500 mg tablet Commonly known as:  GLUCOPHAGE  
 Take 1,000 mg by mouth two (2) times daily (with meals). MIRALAX 17 gram/dose powder Generic drug:  polyethylene glycol Take 17 g by mouth daily. NovoLOG Flexpen U-100 Insulin 100 unit/mL In Generic drug:  insulin aspart U-100  
12 Units by SubCUTAneous route Before breakfast, lunch, and dinner. ONETOUCH ULTRA TEST strip Generic drug:  glucose blood VI test strips TOUJEO SOLOSTAR U-300 INSULIN 300 unit/mL (1.5 mL) In Generic drug:  insulin glargine INJECT 60 UNITS SUBCUTANEOUSLY ONCE A DAY  
  
 valsartan 80 mg tablet Commonly known as:  DIOVAN Take 1 Tab by mouth daily. varicella-zoster recombinant (PF) 50 mcg/0.5 mL Susr injection Commonly known as:  SHINGRIX (PF)  
0.5 mL by IntraMUSCular route once for 1 dose. Prescriptions Sent to Pharmacy Refills  
 varicella-zoster recombinant, PF, (SHINGRIX, PF,) 50 mcg/0.5 mL susr injection 1 Si.5 mL by IntraMUSCular route once for 1 dose. Class: Normal  
 Pharmacy: [facility] Ph #: [phone] Route: IntraMUSCular  
 diphtheria-pertussis, acellular,-tetanus (BOOSTRIX TDAP) 2.5-8-5 Lf-mcg-Lf/0.5mL susp susp 0 Si.5 mL by IntraMUSCular route once for 1 dose. Class: Normal  
 [facility] Ph #: [phone] Route: IntraMUSCular We Performed the Following REFERRAL TO GASTROENTEROLOGY [DRS37 Custom] Follow-up Instructions Return in about 6 months (around 8/15/2018) for Follow up. Referral Information Referral ID Referred By Referred To  
  
 [id number] [place], [address] [facility]   
   [address] [place] [address] [place], [address] [place] [address]anya Visits Status Start Date End Date 1 New Request 2/15/18 2/15/19  If your referral has a status of pending review or denied, additional information will be sent to support the outcome of this decision. Patient Instructions Well Visit, Over 72: Care Instructions Your Care Instructions Physical exams can help you stay healthy. Your doctor has checked your overall health and may have suggested ways to take good care of yourself. He or she also may have recommended tests. At home, you can help prevent illness with healthy eating, regular exercise, and other steps. Follow-up care is a key part of your treatment and safety. Be sure to make and go to all appointments, and call your doctor if you are having problems. It's also a good idea to know your test results and keep a list of the medicines you take. How can you care for yourself at home? · Reach and stay at a healthy weight. This will lower your risk for many problems, such as obesity, diabetes, heart disease, and high blood pressure. · Get at least 30 minutes of exercise on most days of the week. Walking is a good choice. You also may want to do other activities, such as running, swimming, cycling, or playing tennis or team sports. · Do not smoke. Smoking can make health problems worse. If you need help quitting, talk to your doctor about stop-smoking programs and medicines. These can increase your chances of quitting for good. · Protect your skin from too much sun. When you're outdoors from 10 a.m. to 4 p.m., stay in the shade or cover up with clothing and a hat with a wide brim. Wear sunglasses that block UV rays. Even when it's cloudy, put broad-spectrum sunscreen (SPF 30 or higher) on any exposed skin. · See a dentist one or two times a year for checkups and to have your teeth cleaned. · Wear a seat belt in the car. · Limit alcohol to 2 drinks a day for men and 1 drink a day for women. Too much alcohol can cause health problems. Follow your doctor's advice about when to have certain tests. These tests can spot problems early. For men and women · Cholesterol. Your doctor will tell you how often to have this done based on your overall health and other things that can increase your risk for heart attack and stroke. · Blood pressure. Have your blood pressure checked during a routine doctor visit. Your doctor will tell you how often to check your blood pressure based on your age, your blood pressure results, and other factors. · Diabetes. Ask your doctor whether you should have tests for diabetes. · Vision. Experts recommend that you have yearly exams for glaucoma and other age-related eye problems. · Hearing. Tell your doctor if you notice any change in your hearing. You can have tests to find out how well you hear. · Colon cancer tests. Keep having colon cancer tests as your doctor recommends. You can have one of several types of tests. · Heart attack and stroke risk. At least every 4 to 6 years, you should have your risk for heart attack and stroke assessed. Your doctor uses factors such as your age, blood pressure, cholesterol, and whether you smoke or have diabetes to show what your risk for a heart attack or stroke is over the next 10 years. · Osteoporosis. Talk to your doctor about whether you should have a bone density test to find out whether you have thinning bones. Also ask your doctor about whether you should take calcium and vitamin D supplements. For women · Pap test and pelvic exam. You may no longer need a Pap test. Talk with your doctor about whether to stop or continue to have Pap tests. · Breast exam and mammogram. Ask how often you should have a mammogram, which is an X-ray of your breasts. A mammogram can spot breast cancer before it can be felt and when it is easiest to treat. · Thyroid disease. Talk to your doctor about whether to have your thyroid checked as part of a regular physical exam. Women have an increased chance of a thyroid problem. For men · Prostate exam. Talk to your doctor about whether you should have a blood test (called a PSA test) for prostate cancer. Experts disagree on whether men should have this test. Some experts recommend that you discuss the benefits and risks of the test with your doctor. · Abdominal aortic aneurysm. Ask your doctor whether you should have a test to check for an aneurysm. You may need a test if you ever smoked or if your parent, brother, sister, or child has had an aneurysm. When should you call for help? Watch closely for changes in your health, and be sure to contact your doctor if you have any problems or symptoms that concern you. Where can you learn more? Go to http://vignesh-nini.info/. Enter A181 in the search box to learn more about \"Well Visit, Over 65: Care Instructions. \" Current as of: May 12, 2017 Content Version: 11.4 © 7122-1831 Healthwise, Incorporated. Care instructions adapted under license by [employer] (which disclaims liability or warranty for this information). If you have questions about a medical condition or this instruction, always ask your healthcare professional. [place] [address] any warranty or liability for your use of this information. Dr. Murali Cole, [facility] counselor, substance abuse counselor [address] Phone: [phone] [facility], [address], [address] [place] [address] [address] [place]  
[address] Damian Ginger, [address] Phone [phone] Fax [phone] [facility] Vannessa Esteban, Ph.D. 
350 [facility] Phone: [phone] FAX: [phone] 
[address] Phone: [phone] FAX: [phone] 
201 [facility], [address] [facility] 7 [address] [facility] [phone] FAX: [phone] [address] [facility], [address] [facility] [phone] FAX: [phone] [facility], [address] [facility], [address] [facility] Jessica Armstrong, [address] [facility] [address] [facility] 33 
[phone] [facility] [place] - Loren Diehl, Ph.D 
[address] [facility] [address] [place], [address] [facility] [address]  
[phone] [facility] [address] 1099 [facility] Center [place], [address] 
[phone] [facility] Office 551 [facility], 15 [facility] 
[phone] [facility], [address] [place] [address], [place], [address] [phone] [facility] ([place] near [facility]) [phone] [address] Location 1230 [facility] 33 47 [facility] [address] [place] [address], [address] [place], [facility] [address] 
[phone] (Preventive care checklist to be included in patient instructions) Discussed today Done Previously Not Needed X -23 in May -13 last may  Pneumococcal vaccines X declines   Flu vaccine  
  x Hepatitis B vaccine (if at risk) X rx to pharm   Shingles vaccine X rx to pharm   TDAP vaccine  
 x  Mammogram  
  x Pap smear X refer again x  Colorectal cancer screening  
  x Low-dose CT for lung cancer screening X 2013  Bone density test  
 x  Glaucoma screening  
 x  Cholesterol test  
 x  Diabetes screening test   
 x  Diabetes self-management class  
 x  Nutritionist referral for diabetes or renal disease Introducing [facility]! Emilie Daly introduces [employer] patient portal. Now you can access parts of your medical record, email your doctor's office, and request medication refills online. 1. In your internet browser, go to [email or web address] 2. Click on the First Time User? Click Here link in the Sign In box.  You will see the New Member Sign Up page. 3. Enter your Ang[employer]ish Access Code exactly as it appears below. You will not need to use this code after youve completed the sign-up process. If you do not sign up before the expiration date, you must request a new code. · Angelfish Access Code: [email or web address] Expires: 4/13/2018 11:25 AM 
 
4. Enter the last four digits of your Social Security Number (xxxx) and Date of Birth (mm/dd/yyyy) as indicated and click Submit. You will be taken to the next sign-up page. 5. Create a Angelfish ID. This will be your Angelfish login ID and cannot be changed, so think of one that is secure and easy to remember. 6. Create a Angelfish password. You can change your password at any time. 7. Enter your Password Reset Question and Answer. This can be used at a later time if you forget your password. 8. Enter your e-mail address. You will receive e-mail notification when new information is available in [address] 9. Click Sign Up. You can now view and download portions of your medical record. 10. Click the Download Summary menu link to download a portable copy of your medical information. If you have questions, please visit the Frequently Asked Questions section of the Angelfish website. Remember, Ang[employer]ish is NOT to be used for urgent needs. For medical emergencies, dial 911. Now available from your iPhone and Android! Please provide this summary of care documentation to your next provider. Your primary care clinician is listed as [place] [address], [facility]/s . If you have any questions after today's visit, please call [phone].

## 2018-02-15 NOTE — PROGRESS NOTES
Sacha Kenyon [address] [facility]  [id number] [place] [address] [address] [facility], [address]  [phone]             Date of visit: 2/15/18       This is an Subsequent Medicare Annual Wellness Visit (AWV)    I have reviewed the patient's medical history in detail and updated the computerized patient record. Jai Ortega is a 78 y.o. female   History obtained from: the patient.     Concerns today   -saw endocrinologist, labs were ok  -see scanned    -stress with nephew Christiano 6 yrs old going through a hard time  Might be going to [facility] with dad this summer which would worry her  -sleeping more but at the wrong time  Gets sleepy after she eats her dinner  Sometimes drowsy other times during the day    Really hard loss of good friend 3 years ago with cancer, her dad  within a month of him, she was taking care of both  After they  she didn't know who she was--had been a caregiver for a long time    -LLQ pain gone, the miralax worked but still needing it  Wasn't constipated before so would like to get colonoscopy  Has been 5 years    -going to [facility] in April and wants back to be better--will be seeing her spine specialist soon, hurts in right low back and radiates down leg    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    Reflux esophagitis K21.0    Degeneration of intervertebral disc, site unspecified [id number]    Cervical spondylosis without myelopathy M47.812    Contact dermatitis and other eczema, due to unspecified cause L25.9    Allergic rhinitis J30.9    Vitamin D deficiency N50.0    Lichen sclerosus M55.1    Hypertension I10    ACP (advance care planning) Z71.89    Type 2 diabetes mellitus without complication, with long-term current use of insulin ([address] E11.9, Z79.4    Situational anxiety F41.8    Dyslipidemia, goal LDL below 100 E78.5    Type 2 diabetes mellitus with nephropathy ([facility] E11.21     Past Medical History:   Diagnosis Date    Allergic rhinitis     DDD (degenerative disc disease)     thoracic spine    DJD (degenerative joint disease) of cervical spine     Dyslipidemia, goal LDL below 100 9/29/2017    Eczema     Hypertension     Lichen sclerosus     [facility]    ANMOL (obstructive sleep apnea)     CPAP    Other and unspecified hyperlipidemia     Reflux esophagitis     Situational anxiety 9/29/2017    Type II or unspecified type diabetes mellitus without mention of complication, not stated as uncontrolled       Past Surgical History:   Procedure Laterality Date    HX BACK SURGERY      x 3    HX COLONOSCOPY  2013    HX GYN      benign tumors removed from uterus    HX HEENT      sinus surgery for snoring    HX HIP REPLACEMENT  2004    rt    HX ORTHOPAEDIC Bilateral July 2013    hip replacement    HX TONSIL AND ADENOIDECTOMY      HX TUBAL LIGATION      PALATOPHAYNGOPLASTY  2002    IN DCMPRN PERQ NUCLEUS PULPOSUS 1/> LEVELS LUMBAR  2003    L3-4     Allergies   Allergen Reactions    Aspirin Nausea and Vomiting     Can take a coated aspirin.  Ciprocinonide Swelling    Crestor [Rosuvastatin] Myalgia    Sulfa (Sulfonamide Antibiotics) Hives     Current Outpatient Prescriptions   Medication Sig Dispense Refill    polyethylene glycol (MIRALAX) 17 gram/dose powder Take 17 g by mouth daily.  calcium carbonate (TUMS) 200 mg calcium (500 mg) chew Take 1 Tab by mouth daily.  TOUJEO SOLOSTAR 300 unit/mL (1.5 mL) inpn INJECT 60 UNITS SUBCUTANEOUSLY ONCE A DAY  0    albuterol (PROVENTIL HFA, VENTOLIN HFA, PROAIR HFA) 90 mcg/actuation inhaler Take 1-2 Puffs by inhalation every four (4) hours as needed for Wheezing. 1 Inhaler 5    latanoprost (XALATAN) 0.005 % ophthalmic solution PLACE 1 DROP IN BOTH EYES EVERY EVENING  0    valsartan (DIOVAN) 80 mg tablet Take 1 Tab by mouth daily.       atorvastatin (LIPITOR) 40 mg tablet Take 1 Tab by mouth daily. For cholesterol 30 Tab 5    BD INSULIN PEN NEEDLE UF MINI 31 gauge x 3/16\" ndle use four times a day for INSULIN INJECTIONS as directed  1    insulin aspart (NOVOLOG FLEXPEN) 100 unit/mL inpn 12 Units by SubCUTAneous route Before breakfast, lunch, and dinner.  ONETOUCH ULTRA TEST strip   0    glimepiride (AMARYL) 4 mg tablet take 1 tablet every morning 30 Tab 0    metFORMIN (GLUCOPHAGE) 500 mg tablet Take 1,000 mg by mouth two (2) times daily (with meals).  halobetasol (ULTRAVATE) 0.05 % ointment   0     Family History   Problem Relation Age of Onset    Cancer Maternal Grandfather      lung    Cancer Paternal Grandmother     Cancer Paternal Grandfather      colon    Heart Attack Mother     Dementia Father      alzheimers    Dementia Maternal Grandmother      alzheimers    Breast Cancer Paternal Aunt      Social History   Substance Use Topics    Smoking status: Never Smoker    Smokeless tobacco: Never Used    Alcohol use Yes      Comment: occasional       Specialists/Care Team   [facility]. Can Danis has established care with the following healthcare providers:  Patient Care Team:  Ab Petersen MD as PCP - [address], [place] (Endocrinology)  Mylene Goldberg, RN as Ambulatory Care Navigator  Gyn at [facility] (used to be Dr. Deo Robles who left)  Dr. Osmel Mcallister for eyes  Doctor for her back    Health Risk Assessment     Demographics   female  78 y.o. General Health Questions   -During the past 4 weeks:   -how would you rate your health in general? Better now   -how often have you been bothered by feeling dizzy when standing up? never   -how much have you been bothered by bodily pain?  A lot with back, going to follow up with her spine specialist   -Have you noticed any hearing difficulties? no   -has your physical and emotional health limited your social activities with family or friends? sometimes    Emotional Health Questions -Do you have a history of depression, anxiety, or emotional problems? no  -Over the past 2 weeks, have you felt down, depressed or hopeless? Some for past 3 years with grief  -Over the past 2 weeks, have you felt little interest or pleasure in doing things? Some in past 3 years    Health Habits   Please describe your diet habits: trying to eat better, trying to stop snacking and healthier snacks  Do you get 5 servings of fruits or vegetables daily? Maybe, loves fruits but has to limit due to sugar  Do you exercise regularly? Walks in stores for maybe an hour but has to stop a lot due to back pain    Activities of Daily Living and Functional Status   -Do you need help with eating, walking, dressing, bathing, toileting, the phone, transportation, shopping, preparing meals, housework, laundry, medications or managing money? no  -In the past four weeks, was someone available to help you if you needed and wanted help with anything? yes  -Are you confident are you that you can control and manage most of your health problems? yes  -Have you been given information to help you keep track of your medications? yes  -How often do you have trouble taking your medications as prescribed? never    Fall Risk and Home Safety   Have you fallen 2 or more times in the past year? No, just once outside this summer tripped on something in the yard, hit her right knee but ok now  Does your home have rugs in the hallway, lack grab bars in the bathroom, lack handrails on the stairs or have poor lighting? No, everything on one floor although doesn't have grab bars  Do you have smoke detectors and check them regularly?  yes  Do you have difficulties driving a car? no  Do you always fasten your seat belt when you are in a car? yes    Review of Systems (if indicated for problems addressed today)   Card: denies chest pain  Pulm: denies shortness of breath        Physical Examination     Vitals:    02/15/18 1507   BP: 138/79   Pulse: 96   Resp: 18 Temp: 98.6 °F (37 °C)   TempSrc: Oral   SpO2: 98%   Weight: 220 lb 12.8 oz (100.2 kg)   Height: 5' 6\" (1.676 m)     Body mass index is 35.64 kg/(m^2). No exam data present  Was the patient's timed Up & Go test unsteady or longer than 30 seconds? no    Evaluation of Cognitive Function   Mood/affect:  happy  Orientation: normal  Appearance: age appropriate  Family member/caregiver input: none    Additional exam if indicated for problems addressed today:  General: stated age, obese and in NAD  Neck: supple, symmetrical, trachea midline, no adenopathy and thyroid: not enlarged, symmetric, no tenderness/mass/nodules  Lungs:  clear to auscultation w/o rales, rhonchi, wheezes w/normal effort and no use of accessory muscles of respiration   Heart: regular rate and rhythm, S1, S2 normal, no murmur, click, rub or gallop  Abdomen: soft, nontender, no masses  Ext:  No edema noted.    Lymph: no cervical adenopathy appreciated  Skin:  Normal. and no rash or abnormalities   Psych: alert and oriented to person, place, time and situation and Speech: appropriate quality, quantity and organization of sentences     Advice/Referrals/Counseling (as indicated)   Education and counseling provided for any problems identified above: diet    Preventive Services     Health Maintenance   Topic Date Due    FOOT EXAM Q1  05/03/2018    HEMOGLOBIN A1C Q6M  08/08/2018    EYE EXAM RETINAL OR DILATED Q1  11/27/2018    LIPID PANEL Q1  01/13/2019    MICROALBUMIN Q1  02/08/2019    MEDICARE YEARLY EXAM  02/16/2019    BREAST CANCER SCRN MAMMOGRAM  02/27/2019    GLAUCOMA SCREENING Q2Y  11/27/2019    COLONOSCOPY  02/11/2023    DTaP/Tdap/Td series (2 - Td) 02/13/2027    Hepatitis C Screening  Addressed    OSTEOPOROSIS SCREENING (DEXA)  Completed    ZOSTER VACCINE AGE 60>  Addressed    Pneumococcal 65+ High/Highest Risk  Addressed    Influenza Age 5 to Adult  Addressed      (Preventive care checklist to be included in patient instructions)  Discussed today Done Previously Not Needed    X -23 due in May -13  Pneumococcal vaccines   X declines   Flu vaccine     x Hepatitis B vaccine (if at risk)   X maybe sent rx   Shingles vaccine   X maybe sent rx   TDAP vaccine    x  Mammogram     x Pap smear   X refer again x  Colorectal cancer screening     x Low-dose CT for lung cancer screening    X 2013, normal per patient  Bone density test    x  Glaucoma screening    x  Cholesterol test    x  Diabetes screening test     x  Diabetes self-management class    x  Nutritionist referral for diabetes or renal disease     Discussion of Advance Directive   Discussed with Ger Murray her ability to prepare and advance directive in the case that an injury or illness causes her to be unable to make health care decisions. Interested in making one. Gave her Honoring Choices folder    Assessment/Plan   Z00.00    ICD-10-CM ICD-9-CM    1. Medicare annual wellness visit, subsequent Z00.00 V70.0    2. Constipation, unspecified constipation type K59.00 564.00 REFERRAL TO GASTROENTEROLOGY   3. Obesity (BMI 30-39. 9) E66.9 278.00    4. ANMOL (obstructive sleep apnea) G47.33 327.23    5. Type 2 diabetes mellitus without complication, with long-term current use of insulin (HCC) E11.9 250.00     Z79.4 V58.67    6. Colon cancer screening Z12.11 V76.51 REFERRAL TO GASTROENTEROLOGY   7. Essential hypertension I10 401.9    8. Situational anxiety F41.8 300.09    9.  Type 2 diabetes mellitus with nephropathy (HCC) E11.21 250.40      583.81    doing pretty well other than stress, let her talk about that for a while  encoruaged counseling either alone or with her nephew  Very stressful situation, she actually jamaal very well considering  Constipation doing well on daily miralax but getting repeat colonoscopy as this is a new problem  Encouraged preventive care as above    Orders Placed This Encounter    [facility]    polyethylene glycol (MIRALAX) 17 gram/dose powder    varicella-zoster recombinant, PF, (SHINGRIX, PF,) 50 mcg/0.5 mL susr injection    diphtheria-pertussis, acellular,-tetanus (BOOSTRIX TDAP) 2.5-8-5 Lf-mcg-Lf/0.5mL susp susp       Follow-up Disposition:  Return in about 6 months (around 8/15/2018) for Follow up.     Sarina Perez MD

## 2018-02-15 NOTE — PROGRESS NOTES
Chief Complaint   Patient presents with    Annual Wellness Visit    Constipation     Miralax has helped        Reviewed Record in preparation for visit and have obtained necessary documentation. Identified pt with two pt identifiers (Name @ )    Health Maintenance Due   Topic    MEDICARE YEARLY EXAM          1. Have you been to the ER, urgent care clinic since your last visit? Hospitalized since your last visit? No    2. Have you seen or consulted any other health care providers outside of the [address] [place] [address] since your last visit? Include any pap smears or colon screening.  No

## 2018-02-15 NOTE — ACP (ADVANCE CARE PLANNING)
Discussed with Ricardo [place]. Robby Lopez her ability to prepare and advance directive in the case that an injury or illness causes her to be unable to make health care decisions. Interested in making one.  Gave her Honoring Choices folder

## 2018-02-15 NOTE — PATIENT INSTRUCTIONS
Well Visit, Over 72: Care Instructions  Your Care Instructions    Physical exams can help you stay healthy. Your doctor has checked your overall health and may have suggested ways to take good care of yourself. He or she also may have recommended tests. At home, you can help prevent illness with healthy eating, regular exercise, and other steps. Follow-up care is a key part of your treatment and safety. Be sure to make and go to all appointments, and call your doctor if you are having problems. It's also a good idea to know your test results and keep a list of the medicines you take. How can you care for yourself at home? · Reach and stay at a healthy weight. This will lower your risk for many problems, such as obesity, diabetes, heart disease, and high blood pressure. · Get at least 30 minutes of exercise on most days of the week. Walking is a good choice. You also may want to do other activities, such as running, swimming, cycling, or playing tennis or team sports. · Do not smoke. Smoking can make health problems worse. If you need help quitting, talk to your doctor about stop-smoking programs and medicines. These can increase your chances of quitting for good. · Protect your skin from too much sun. When you're outdoors from 10 a.m. to 4 p.m., stay in the shade or cover up with clothing and a hat with a wide brim. Wear sunglasses that block UV rays. Even when it's cloudy, put broad-spectrum sunscreen (SPF 30 or higher) on any exposed skin. · See a dentist one or two times a year for checkups and to have your teeth cleaned. · Wear a seat belt in the car. · Limit alcohol to 2 drinks a day for men and 1 drink a day for women. Too much alcohol can cause health problems. Follow your doctor's advice about when to have certain tests. These tests can spot problems early. For men and women  · Cholesterol.  Your doctor will tell you how often to have this done based on your overall health and other things that can increase your risk for heart attack and stroke. · Blood pressure. Have your blood pressure checked during a routine doctor visit. Your doctor will tell you how often to check your blood pressure based on your age, your blood pressure results, and other factors. · Diabetes. Ask your doctor whether you should have tests for diabetes. · Vision. Experts recommend that you have yearly exams for glaucoma and other age-related eye problems. · Hearing. Tell your doctor if you notice any change in your hearing. You can have tests to find out how well you hear. · Colon cancer tests. Keep having colon cancer tests as your doctor recommends. You can have one of several types of tests. · Heart attack and stroke risk. At least every 4 to 6 years, you should have your risk for heart attack and stroke assessed. Your doctor uses factors such as your age, blood pressure, cholesterol, and whether you smoke or have diabetes to show what your risk for a heart attack or stroke is over the next 10 years. · Osteoporosis. Talk to your doctor about whether you should have a bone density test to find out whether you have thinning bones. Also ask your doctor about whether you should take calcium and vitamin D supplements. For women  · Pap test and pelvic exam. You may no longer need a Pap test. Talk with your doctor about whether to stop or continue to have Pap tests. · Breast exam and mammogram. Ask how often you should have a mammogram, which is an X-ray of your breasts. A mammogram can spot breast cancer before it can be felt and when it is easiest to treat. · Thyroid disease. Talk to your doctor about whether to have your thyroid checked as part of a regular physical exam. Women have an increased chance of a thyroid problem. For men  · Prostate exam. Talk to your doctor about whether you should have a blood test (called a PSA test) for prostate cancer.  Experts disagree on whether men should have this test. Some experts recommend that you discuss the benefits and risks of the test with your doctor. · Abdominal aortic aneurysm. Ask your doctor whether you should have a test to check for an aneurysm. You may need a test if you ever smoked or if your parent, brother, sister, or child has had an aneurysm. When should you call for help? Watch closely for changes in your health, and be sure to contact your doctor if you have any problems or symptoms that concern you. Where can you learn more? Go to http://vignesh-nini.info/. Enter C757 in the search box to learn more about \"Well Visit, Over 65: Care Instructions. \"  Current as of: May 12, 2017  Content Version: 11.4  © 8992-1506 [facility]. Care instructions adapted under license by [employer] (which disclaims liability or warranty for this information). If you have questions about a medical condition or this instruction, always ask your healthcare professional. [place] [address] any warranty or liability for your use of this information. Dr. Po Richardson,  [facility] counselor, substance abuse counselor  [place]., [address] [place] [address]  Phone: [phone]    [facility], [address], [facility]0 [address] Chilo Fine [address] [place], [place] [address]TL   [address], [place] [address]  Phone [phone]  Fax [phone]  [employer]. Seb Pinedo, Ph.D.  Stephanie Marin Office  Phone: [phone]  FAX: [phone]  [address]  Phone: [phone]  FAX: [phone]  201 [facility], [address] [place] [address] 130[address] [facility] Office  [phone]  FAX: [phone] [address] [place] [address], [address] Office  [phone]  FAX: 800 [facility], 295 [facility], [address] [place] [address] Etta Dyer, [facility] [address] [facility]   [place], [facility]  [phone]    [employer] of [address], Ph.D  [address] [facility] [place] [facility] [address], [address]   [phone]    The [address] Office  [facility]  [place], [address]  [phone]    [facility] Office  [address], [address], 70 [facility] 7833, [place], [address]   [phone]    [facility] ([facility])  [phone]    [facility] [place] Location  [address], [facility] 33    [address] [facility] and [address], [address] [place] [address], [address] [facility]  [phone]      (Preventive care checklist to be included in patient instructions)  Discussed today Done Previously Not Needed    X -23 in May -13 last may  Pneumococcal vaccines   X declines   Flu vaccine     x Hepatitis B vaccine (if at risk)   X rx to pharm   Shingles vaccine   X rx to pharm   TDAP vaccine    x  Mammogram     x Pap smear   X refer again x  Colorectal cancer screening     x Low-dose CT for lung cancer screening    X 2013  Bone density test    x  Glaucoma screening    x  Cholesterol test    x  Diabetes screening test     x  Diabetes self-management class    x  Nutritionist referral for diabetes or renal disease

## 2018-02-19 ENCOUNTER — TELEPHONE (OUTPATIENT)
Dept: FAMILY MEDICINE CLINIC | Age: 68
End: 2018-02-19

## 2018-02-19 NOTE — TELEPHONE ENCOUNTER
Patient is calling requesting her CAT scan that she had done. Patient states that she has an appt with  gastranolgy on 2/21/18 at 11:30 am and Dr. Clarita Caldera to see the CAT scan.     Fax: [phone]    Pts number: [phone]  2/19/18

## 2018-02-20 DIAGNOSIS — E78.01 FAMILIAL HYPERCHOLESTEROLEMIA: ICD-10-CM

## 2018-02-21 RX ORDER — ATORVASTATIN CALCIUM 40 MG/1
TABLET, FILM COATED ORAL
Qty: 30 TAB | Refills: 5 | Status: SHIPPED | OUTPATIENT
Start: 2018-02-21 | End: 2018-02-26

## 2018-02-26 RX ORDER — INSULIN ASPART 100 [IU]/ML
16-20 INJECTION, SOLUTION INTRAVENOUS; SUBCUTANEOUS
Status: ON HOLD | COMMUNITY
End: 2019-12-18

## 2018-02-26 RX ORDER — ATORVASTATIN CALCIUM 40 MG/1
40 TABLET, FILM COATED ORAL
Status: ON HOLD | COMMUNITY
End: 2019-12-18

## 2018-02-26 RX ORDER — FUROSEMIDE 40 MG/1
40 TABLET ORAL
COMMUNITY
End: 2018-06-08 | Stop reason: SDUPTHER

## 2018-02-26 RX ORDER — GLIMEPIRIDE 4 MG/1
4 TABLET ORAL
COMMUNITY
End: 2020-05-14

## 2018-02-27 ENCOUNTER — ANESTHESIA EVENT (OUTPATIENT)
Dept: ENDOSCOPY | Age: 68
End: 2018-02-27
Payer: MEDICARE

## 2018-02-27 ENCOUNTER — ANESTHESIA (OUTPATIENT)
Dept: ENDOSCOPY | Age: 68
End: 2018-02-27
Payer: MEDICARE

## 2018-02-27 ENCOUNTER — HOSPITAL ENCOUNTER (OUTPATIENT)
Age: 68
Setting detail: OUTPATIENT SURGERY
Discharge: HOME | End: 2018-02-27
Attending: INTERNAL MEDICINE | Admitting: INTERNAL MEDICINE
Payer: MEDICARE

## 2018-02-27 VITALS
BODY MASS INDEX: 35.36 KG/M2 | HEART RATE: 92 BPM | TEMPERATURE: 98.1 F | DIASTOLIC BLOOD PRESSURE: 88 MMHG | WEIGHT: 220 LBS | RESPIRATION RATE: 10 BRPM | SYSTOLIC BLOOD PRESSURE: 175 MMHG | HEIGHT: 66 IN | OXYGEN SATURATION: 100 %

## 2018-02-27 LAB
COLONOSCOPY, EXTERNAL: NORMAL
GLUCOSE BLD STRIP.AUTO-MCNC: 117 MG/DL (ref 65–100)
SERVICE CMNT-IMP: ABNORMAL

## 2018-02-27 PROCEDURE — 77030011640 HC PAD GRND REM COVD -A: Performed by: INTERNAL MEDICINE

## 2018-02-27 PROCEDURE — 77030009426 HC FCPS BIOP ENDOSC BSC -B: Performed by: INTERNAL MEDICINE

## 2018-02-27 PROCEDURE — 82962 GLUCOSE BLOOD TEST: CPT

## 2018-02-27 PROCEDURE — 77030027957 HC TBNG IRR ENDOGTR BUSS -B: Performed by: INTERNAL MEDICINE

## 2018-02-27 PROCEDURE — 76040000019: Performed by: INTERNAL MEDICINE

## 2018-02-27 PROCEDURE — 88305 TISSUE EXAM BY PATHOLOGIST: CPT | Performed by: INTERNAL MEDICINE

## 2018-02-27 PROCEDURE — 74011250636 HC RX REV CODE- 250/636

## 2018-02-27 PROCEDURE — 76060000031 HC ANESTHESIA FIRST 0.5 HR: Performed by: INTERNAL MEDICINE

## 2018-02-27 PROCEDURE — 74011000250 HC RX REV CODE- 250

## 2018-02-27 RX ORDER — DEXTROMETHORPHAN/PSEUDOEPHED 2.5-7.5/.8
1.2 DROPS ORAL
Status: DISCONTINUED | OUTPATIENT
Start: 2018-02-27 | End: 2018-02-27 | Stop reason: HOSPADM

## 2018-02-27 RX ORDER — EPINEPHRINE 0.1 MG/ML
1 INJECTION INTRACARDIAC; INTRAVENOUS
Status: DISCONTINUED | OUTPATIENT
Start: 2018-02-27 | End: 2018-02-27 | Stop reason: HOSPADM

## 2018-02-27 RX ORDER — ATROPINE SULFATE 0.1 MG/ML
0.5 INJECTION INTRAVENOUS
Status: DISCONTINUED | OUTPATIENT
Start: 2018-02-27 | End: 2018-02-27 | Stop reason: HOSPADM

## 2018-02-27 RX ORDER — LIDOCAINE HYDROCHLORIDE 20 MG/ML
INJECTION, SOLUTION EPIDURAL; INFILTRATION; INTRACAUDAL; PERINEURAL AS NEEDED
Status: DISCONTINUED | OUTPATIENT
Start: 2018-02-27 | End: 2018-02-27 | Stop reason: HOSPADM

## 2018-02-27 RX ORDER — PROPOFOL 10 MG/ML
INJECTION, EMULSION INTRAVENOUS AS NEEDED
Status: DISCONTINUED | OUTPATIENT
Start: 2018-02-27 | End: 2018-02-27 | Stop reason: HOSPADM

## 2018-02-27 RX ORDER — MIDAZOLAM HYDROCHLORIDE 1 MG/ML
.25-1 INJECTION, SOLUTION INTRAMUSCULAR; INTRAVENOUS
Status: DISCONTINUED | OUTPATIENT
Start: 2018-02-27 | End: 2018-02-27 | Stop reason: HOSPADM

## 2018-02-27 RX ORDER — FENTANYL CITRATE 50 UG/ML
200 INJECTION, SOLUTION INTRAMUSCULAR; INTRAVENOUS
Status: DISCONTINUED | OUTPATIENT
Start: 2018-02-27 | End: 2018-02-27 | Stop reason: HOSPADM

## 2018-02-27 RX ORDER — SODIUM CHLORIDE 9 MG/ML
100 INJECTION, SOLUTION INTRAVENOUS CONTINUOUS
Status: DISCONTINUED | OUTPATIENT
Start: 2018-02-27 | End: 2018-02-27 | Stop reason: HOSPADM

## 2018-02-27 RX ORDER — SODIUM CHLORIDE 9 MG/ML
INJECTION, SOLUTION INTRAVENOUS
Status: DISCONTINUED | OUTPATIENT
Start: 2018-02-27 | End: 2018-02-27 | Stop reason: HOSPADM

## 2018-02-27 RX ORDER — FLUMAZENIL 0.1 MG/ML
0.2 INJECTION INTRAVENOUS
Status: DISCONTINUED | OUTPATIENT
Start: 2018-02-27 | End: 2018-02-27 | Stop reason: HOSPADM

## 2018-02-27 RX ORDER — SODIUM CHLORIDE 0.9 % (FLUSH) 0.9 %
5-10 SYRINGE (ML) INJECTION EVERY 8 HOURS
Status: DISCONTINUED | OUTPATIENT
Start: 2018-02-27 | End: 2018-02-27 | Stop reason: HOSPADM

## 2018-02-27 RX ORDER — SODIUM CHLORIDE 0.9 % (FLUSH) 0.9 %
5-10 SYRINGE (ML) INJECTION AS NEEDED
Status: DISCONTINUED | OUTPATIENT
Start: 2018-02-27 | End: 2018-02-27 | Stop reason: HOSPADM

## 2018-02-27 RX ORDER — NALOXONE HYDROCHLORIDE 0.4 MG/ML
0.4 INJECTION, SOLUTION INTRAMUSCULAR; INTRAVENOUS; SUBCUTANEOUS
Status: DISCONTINUED | OUTPATIENT
Start: 2018-02-27 | End: 2018-02-27 | Stop reason: HOSPADM

## 2018-02-27 RX ADMIN — PROPOFOL 50 MG: 10 INJECTION, EMULSION INTRAVENOUS at 10:23

## 2018-02-27 RX ADMIN — PROPOFOL 30 MG: 10 INJECTION, EMULSION INTRAVENOUS at 10:33

## 2018-02-27 RX ADMIN — PROPOFOL 30 MG: 10 INJECTION, EMULSION INTRAVENOUS at 10:31

## 2018-02-27 RX ADMIN — PROPOFOL 30 MG: 10 INJECTION, EMULSION INTRAVENOUS at 10:35

## 2018-02-27 RX ADMIN — PROPOFOL 30 MG: 10 INJECTION, EMULSION INTRAVENOUS at 10:28

## 2018-02-27 RX ADMIN — PROPOFOL 30 MG: 10 INJECTION, EMULSION INTRAVENOUS at 10:26

## 2018-02-27 RX ADMIN — LIDOCAINE HYDROCHLORIDE 40 MG: 20 INJECTION, SOLUTION EPIDURAL; INFILTRATION; INTRACAUDAL; PERINEURAL at 10:22

## 2018-02-27 RX ADMIN — PROPOFOL 30 MG: 10 INJECTION, EMULSION INTRAVENOUS at 10:37

## 2018-02-27 RX ADMIN — SODIUM CHLORIDE: 9 INJECTION, SOLUTION INTRAVENOUS at 10:20

## 2018-02-27 NOTE — H&P
The patient is a 78year old female who presents with a complaint of Abdominal Pain. The patient presents for consultation at the request of Dr. Aleah Reyes). The onset of the abdominal pain has been acute and has been occurring for weeks. The abdominal pain is described as is described as being located in the left lower quadrant .  The symptoms have been associated with abdominal distention and constipation,  while the symptoms have not been associated with amenorrhea, anorexia, bloating, bloody stools, black stools, bulky stools, bone pain, chest pain, dark urine, diarrhea, dysuria, early satiety, family history of colon cancer, fever, heartburn, hematemesis, hematuria, jaundice, melena, nausea, passing worms, pica, use of alcohol, vaginal bleeding, vaginal discharge, vomiting, weight loss or other. Note for \"Abdominal Pain\": she c/o last month of LLQ pain with worsening constipation, given cipro and flagyl, CT scan was negative for divertiuclitis, the pain radiated to her back, she has also h/o back pain and chronic constipation. she is now on daily miralax, last colonoscopy in 2013      Problem List/Past Medical (Valescia A. Ephriam Koyanagi; 2/21/2018 11:32 AM)  Hypertension    Hypercholesterolemia    Diabetes Mellitus      Past Surgical History (Valescia A. Ephriam Koyanagi; 2/21/2018 11:32 AM)  Tonsillectomy    Tubal Ligation    Back Surgery    History of hip surgery (V45.89  Z98.890)   Bilateral.    Allergies (Valescia A. Ephriam Koyanagi; 2/21/2018 11:32 AM)  No Known Allergies  [02/16/2018]:  No Known Drug Allergies  [02/16/2018]: Medication History (Valescia A. Ephriam Koyanagi; 2/21/2018 11:35 AM)  MiraLax  (Oral) Active. Glimepiride  (4MG Tablet, 1 Oral daily) Active. MetFORMIN HCl  (500MG Tablet, 1 Oral bid) Active. Valsartan  (80MG Tablet, 1 Oral daily) Active. Atorvastatin Calcium  (40MG Tablet, 1 Oral daily) Active. Tums  (500MG Tablet Chewable, Oral bid) Active. Vitamin D  (2000UNIT Capsule, 1 Oral daily) Active.   Eva Miss (100UNIT/ML Solution, 12-14 units at lunch Subcutaneous 16-18 units at dinner) Active. Toujeo SoloStar  (300UNIT/ML Soln Pen-inj, Subcutaneous) Active. Aspirin  (81MG Tablet, Oral) Active. Latanoprost  (0.005% Solution, 1 Ophthalmic daily) Active. Halobetasol Propionate  (0.05% Ointment, External) Active. Medications Reconciled     Family History (Jon Chung; 2/21/2018 11:32 AM)  Heart Disease   Father, Mother. Kidney problem (593.9  N28.9)   Father. Stroke   Father. Tuberculosis (011.90  A15.9)   Father. Social History (Jon Chung; 2/21/2018 11:32 AM)  Tobacco Use   Never smoker. Alcohol Use   Occasional alcohol use. Marital status   Single. Employment status   Retired. Blood Transfusion   No.    Diagnostic Studies History (Jon Vaughan; 2/21/2018 11:36 AM)  Colonoscopy      Health Maintenance History (Jon Chung; 2/21/2018 11:32 AM)  Flu Vaccine   none  Pneumovax   Date: 2017. Other Problems (Jon Chung; 2/21/2018 11:32 AM)  Colon cancer screening (V76.51  Z12.11)          Review of Systems (Jon Chung; 2/21/2018 11:36 AM)  General Not Present- Chronic Fatigue, Poor Appetite, Weight Gain and Weight Loss. Skin Not Present- Itching, Rash and Skin Color Changes. HEENT Not Present- Hearing Loss and Vertigo. Respiratory Not Present- Difficulty Breathing and TB exposure. Cardiovascular Not Present- Chest Pain, Use of Antibiotics before Dental Procedures and Use of Blood Thinners. Gastrointestinal Present- See HPI. Musculoskeletal Present- Arthritis. Not Present- Hip Replacement Surgery and Knee Replacement Surgery. Neurological Not Present- Weakness. Psychiatric Not Present- Depression. Endocrine Not Present- Diabetes and Thyroid Problems. Hematology Not Present- Anemia. Vitals (Jon Chung; 2/21/2018 11:39 AM)  2/21/2018 11:32 AM  Weight: 221 lb   Height: 66 in   Body Surface Area: 2.09 m²   Body Mass Index: 35.67 kg/m²    Pulse: 80 (Regular)    Resp.: 20 (Unlabored)     BP: 156/80 (Sitting, Left Arm, Standard)              Physical Exam Tor Torres MD; 2/21/2018 6:22 PM)  General  Mental Status - Alert. General Appearance - Cooperative, Pleasant, Not in acute distress. Orientation - Oriented X3. Build & Nutrition - Well nourished and Well developed. Integumentary  General Characteristics  Overall examination of the patient's skin reveals - no rashes, no bruises and no spider angiomas. Color - normal coloration of skin. Head and Neck  Neck  Global Assessment - full range of motion and supple, no bruit auscultated on the right, no bruit auscultated on the left, non-tender, no lymphadenopathy. Thyroid  Gland Characteristics - normal size and consistency. Eye  Eyeball - Left - No Exophthalmos. Eyeball - Right - No Exophthalmos. Sclera/Conjunctiva - Left - No Jaundice. Sclera/Conjunctiva - Right - No Jaundice. Chest and Lung Exam  Chest and lung exam reveals  - quiet, even and easy respiratory effort with no use of accessory muscles. Auscultation  Breath sounds - Normal. Adventitious sounds - No Adventitious sounds. Cardiovascular  Auscultation  Rhythm - Regular, No Tachycardia, No Bradycardia . Heart Sounds - Normal heart sounds , S1 WNL and S2 WNL, No S3, No Summation Gallop. Murmurs & Other Heart Sounds - Auscultation of the heart reveals - No Murmurs. Abdomen  Palpation/Percussion  Tenderness - Non-Tender. Rebound tenderness - No rebound. Rigidity (guarding) - No Rigidity. Dullness to percussion - No abnormal dullness to percussion. Liver - No hepatosplenomegaly. Abdominal Mass Palpable - No masses. Other Characteristics - No Ascites. Auscultation  Auscultation of the abdomen reveals - Bowel sounds normal, No Abdominal bruits and No Succussion splash. Rectal - Did not examine. Peripheral Vascular  Upper Extremity  Inspection - Left - Normal - No Clubbing, No Cyanosis, No Edema, Pulses Intact.  Right - Normal - No Clubbing, No Cyanosis, No Edema, Pulses Intact. Palpation - Edema - Left - No edema. Right - No edema. Lower Extremity  Inspection - Left - Inspection Normal. Right - Inspection Normal. Palpation - Edema - Left - No edema. Right - No edema. Neurologic  Neurologic evaluation reveals  - Cranial nerves grossly intact, no focal neurologic deficits. Motor  Involuntary Movements - Asterixis - not present. Musculoskeletal  Global Assessment  Gait and Station - normal gait and station. Assessment & Plan Tessie Washington MD; 2/21/2018 6:23 PM)  Abdominal pain (789.00  R10.9)  Left lower quadrant pain (789.04  R10.32)  Impression: she c/o last month of LLQ pain with worsening constipation, given cipro and flagyl, CT scan was negative for divertiuclitis, the pain radiated to her back, she has also h/o back pain and chronic constipation. she is now on daily miralax, last colonoscopy in 2013  colonoscopy to r/o any colonic neoplasm  high fiber diet  stay on miralax  Current Plans  Colonoscopy (19870) (Discussed risks and benefits with the patient to include:; perforation, post polypectomy, or post biopsy bleeding, missed lesions, and sedation reactions.)  Started Suprep Bowel Prep Kit 17.5-3.13-1.6GM/180ML, 1 (one) KIt container Milliliter as directed, 354 Milliliter, 1 day starting 02/21/2018, No Refill. Pt Education - How to access health information online: discussed with patient and provided information. Patient is to call me for any questions or concerns. Constipation (564.00  K59.00)  Date of Surgery Update:  Chintan Baron was seen and examined. History and physical has been reviewed. The patient has been examined.  There have been no significant clinical changes since the completion of the originally dated History and Physical.    Signed By: Yaneth Moore MD     February 27, 2018 10:15 AM

## 2018-02-27 NOTE — ANESTHESIA POSTPROCEDURE EVALUATION
Post-Anesthesia Evaluation and Assessment    Patient: Maria C Comer MRN: [id number]  SSN: [id number]    YOB: 1950  Age: 78 y.o. Sex: female       Cardiovascular Function/Vital Signs  Visit Vitals    /88    Pulse 92    Temp 36.7 °C (98.1 °F)    Resp 10    Ht 5' 6\" (1.676 m)    Wt 99.8 kg (220 lb)    SpO2 100%    Breastfeeding No    BMI 35.51 kg/m2       Patient is status post MAC anesthesia for Procedure(s):  COLONOSCOPY  ENDOSCOPIC POLYPECTOMY. Nausea/Vomiting: None    Postoperative hydration reviewed and adequate. Pain:  Pain Scale 1: Numeric (0 - 10) (02/27/18 1110)  Pain Intensity 1: 0 (02/27/18 1110)   Managed    Neurological Status: At baseline    Mental Status and Level of Consciousness: Arousable    Pulmonary Status:   O2 Device: Room air (02/27/18 1110)   Adequate oxygenation and airway patent    Complications related to anesthesia: None    Post-anesthesia assessment completed.  No concerns    Signed By: Jp Villegas DO     February 27, 2018

## 2018-02-27 NOTE — PROCEDURES
[address]  [address] [facility], [address] [place] [address]      Colonoscopy Operative Report    Daniel Chopra  [id number]  1950      Procedure Type:   Colonoscopy with polypectomy (hot biopsy)     Indications:    Abdominal pain, LLQ       Pre-operative Diagnosis: see indication above    Post-operative Diagnosis:  See findings below    :  Bia Dowling MD      Referring Provider: Kristie Marx MD      Sedation:  MAC anesthesia Propofol      Procedure Details:  After informed consent was obtained with all risks and benefits of procedure explained and preoperative exam completed, the patient was taken to the endoscopy suite and placed in the left lateral decubitus position. Upon sequential sedation as per above, a digital rectal exam was performed demonstrating internal hemorrhoids. The Olympus videocolonoscope  was inserted in the rectum and carefully advanced to the cecum, which was identified by the ileocecal valve and appendiceal orifice. The cecum was identified by the ileocecal valve and appendiceal orifice. The quality of preparation was good. The colonoscope was slowly withdrawn with careful evaluation between folds. Retroflexion in the rectum was completed . Findings:   Rectum: normal  Sigmoid:     - Diverticulosis of mild degree    9 mm polyp removed by hot biopsy   Descending Colon: normal  Transverse Colon: normal  Ascending Colon: normal  Cecum: normal  Terminal Ileum: normal      Specimen Removed:  as above    Complications: None. EBL:  None. Impression:    see findings    Recommendations: --Await pathology.       Recommendation for next colonscopy in 5 years  High fiber diet  F/u in 2 months    Signed By: Bia Dowling MD     2/27/2018  10:39 AM

## 2018-02-27 NOTE — PROGRESS NOTES

## 2018-02-27 NOTE — ANESTHESIA PREPROCEDURE EVALUATION
Anesthetic History   No history of anesthetic complications            Review of Systems / Medical History  Patient summary reviewed and pertinent labs reviewed    Pulmonary        Sleep apnea: CPAP           Neuro/Psych   Within defined limits           Cardiovascular    Hypertension              Exercise tolerance: >4 METS     GI/Hepatic/Renal     GERD           Endo/Other    Diabetes: poorly controlled, type 2, using insulin         Other Findings              Physical Exam    Airway  Mallampati: III  TM Distance: 4 - 6 cm  Neck ROM: normal range of motion   Mouth opening: Normal     Cardiovascular    Rhythm: regular  Rate: normal         Dental  No notable dental hx       Pulmonary  Breath sounds clear to auscultation               Abdominal         Other Findings            Anesthetic Plan    ASA: 3  Anesthesia type: MAC            Anesthetic plan and risks discussed with: Patient

## 2018-02-27 NOTE — IP AVS SNAPSHOT
[address] 
[phone] Patient: Magi Hernández MRN: [id number] FEA:6/68/1816 About your hospitalization You were admitted on:  February 27, 2018 You last received care in the:  [facility] ENDOSCOPY You were discharged on:  February 27, 2018 Why you were hospitalized Your primary diagnosis was:  Not on File Follow-up Information None Discharge Orders None A check koko indicates which time of day the medication should be taken. My Medications CONTINUE taking these medications Instructions Each Dose to Equal  
 Morning Noon Evening Bedtime  
 atorvastatin 40 mg tablet Commonly known as:  LIPITOR Your last dose was: Your next dose is: Take 40 mg by mouth nightly. 40 mg  
    
   
   
   
  
 BD INSULIN PEN NEEDLE UF MINI 31 gauge x 3/16\" Ndle Generic drug:  Insulin Needles (Disposable) Your last dose was: Your next dose is:    
   
   
 use four times a day for INSULIN INJECTIONS as directed ENTERIC COATED ASPIRIN PO Your last dose was: Your next dose is: Take 81 mg by mouth nightly. 81 mg  
    
   
   
   
  
 furosemide 40 mg tablet Commonly known as:  LASIX Your last dose was: Your next dose is: Take 40 mg by mouth nightly. 40 mg  
    
   
   
   
  
 glimepiride 4 mg tablet Commonly known as:  AMARYL Your last dose was: Your next dose is: Take 4 mg by mouth nightly. 4 mg  
    
   
   
   
  
 halobetasol 0.05 % ointment Commonly known as:  Ba Body Your last dose was: Your next dose is:    
   
   
 as needed. LATANOPROST OP Your last dose was: Your next dose is:    
   
   
 Administer 1 Drop to both eyes every evening. 1 Drop metFORMIN 500 mg tablet Commonly known as:  GLUCOPHAGE Your last dose was: Your next dose is: Take 1,000 mg by mouth two (2) times daily (with meals). 1000 mg MIRALAX 17 gram/dose powder Generic drug:  polyethylene glycol Your last dose was: Your next dose is: Take 17 g by mouth daily. 17 g * NovoLOG Flexpen U-100 Insulin 100 unit/mL Inpn Generic drug:  insulin aspart U-100 Your last dose was: Your next dose is:    
   
   
 14-18 Units by SubCUTAneous route daily (with dinner). 14-18 Units * NovoLOG Flexpen U-100 Insulin 100 unit/mL Inpn Generic drug:  insulin aspart U-100 Your last dose was: Your next dose is:    
   
   
 14-16 Units by SubCUTAneous route daily (with lunch). 14-16 Units ONETOUCH ULTRA TEST strip Generic drug:  glucose blood VI test strips Your last dose was: Your next dose is:    
   
   
      
   
   
   
  
 TOUJEO SOLOSTAR U-300 INSULIN 300 unit/mL (1.5 mL) Inpn Generic drug:  insulin glargine Your last dose was: Your next dose is:    
   
   
 68 Units by SubCUTAneous route nightly. 68 Units TUMS PO Your last dose was: Your next dose is: Take 2 Tabs by mouth two (2) times a day. 2 Tab  
    
   
   
   
  
 valsartan 80 mg tablet Commonly known as:  DIOVAN Your last dose was: Your next dose is: Take 80 mg by mouth nightly. 80 mg  
    
   
   
   
  
 VITAMIN D3 PO Your last dose was: Your next dose is: Take 2,000 Units by mouth daily. 2000 Units * Notice: This list has 2 medication(s) that are the same as other medications prescribed for you.  Read the directions carefully, and ask your doctor or other care provider to review them with you. Discharge Instructions [address] 
[address] [facility], [address]00 [facility] COLON DISCHARGE INSTRUCTIONS Luis Tapia [id number] 1950 Discomfort: 
Redness at IV site- apply warm compress to area; if redness or soreness persist- contact your physician There may be a slight amount of blood passed from the rectum Gaseous discomfort- walking, belching will help relieve any discomfort You may not operate a vehicle for 12 hours You may not engage in an occupation involving machinery or appliances for rest of today You may not drink alcoholic beverages for at least 12 hours Avoid making any critical decisions for at least 24 hour DIET: 
You may resume your regular diet  however -  remember your colon is empty and a heavy meal will produce gas. Avoid these foods:  vegetables, fried / greasy foods, carbonated drinks ACTIVITY: 
You may  resume your normal daily activities it is recommended that you spend the remainder of the day resting -  avoid any strenuous activity. CALL M.D. ANY SIGN OF: Increasing pain, nausea, vomiting Abdominal distension (swelling) New increased bleeding (oral or rectal) Fever (chills) Pain in chest area Bloody discharge from nose or mouth Shortness of breath Follow-up Instructions: 
 Call Dr. Mariaelena Munson for any questions or problems at [phone] and follow up with him in 6 to 8 weeks High fiber diet ENDOSCOPY FINDINGS: 
 Your colonoscopy showed one small polyp I removed and mild diverticulosis. Telephone # [phone] Signed By: Mariaelena Munson MD   
 2/27/2018  10:43 AM 
  
 
DISCHARGE SUMMARY from Nurse The following personal items collected during your admission are returned to you:  
Dental Appliance: Dental Appliances: None Vision: Visual Aid: None Hearing Aid:   
Jewelry:   
Clothing:   
Other Valuables: Valuables sent to safe: MyChart Activation Thank you for requesting access to Return Path. Please follow the instructions below to securely access and download your online medical record. Return Path allows you to send messages to your doctor, view your test results, renew your prescriptions, schedule appointments, and more. How Do I Sign Up? 1. In your internet browser, go to [email or web address] 
2. Click on the First Time User? Click Here link in the Sign In box. You will be redirect to the New Member Sign Up page. 3. Enter your Return Path Access Code exactly as it appears below. You will not need to use this code after youve completed the sign-up process. If you do not sign up before the expiration date, you must request a new code. Return Path Access Code: [email or web address] Expires: 2018 11:25 AM (This is the date your Return Path access code will ) 4. Enter the last four digits of your Social Security Number (xxxx) and Date of Birth (mm/dd/yyyy) as indicated and click Submit. You will be taken to the next sign-up page. 5. Create a Return Path ID. This will be your Return Path login ID and cannot be changed, so think of one that is secure and easy to remember. 6. Create a Return Path password. You can change your password at any time. 7. Enter your Password Reset Question and Answer. This can be used at a later time if you forget your password. 8. Enter your e-mail address. You will receive e-mail notification when new information is available in [address] 9. Click Sign Up. You can now view and download portions of your medical record. 10. Click the Download Summary menu link to download a portable copy of your medical information. Additional Information If you have questions, please visit the Frequently Asked Questions section of the Return Path website at [email or web address] Remember, Return Path is NOT to be used for urgent needs. For medical emergencies, dial 911. Colon Polyps: Care Instructions Your Care Instructions Colon polyps are growths in the colon or the rectum. The cause of most colon polyps is not known, and most people who get them do not have any problems. But a certain kind can turn into cancer. For this reason, regular testing for colon polyps is important for people age 48 and older and anyone who has an increased risk for colon cancer. Polyps are usually found through routine colon cancer screening tests. Although most colon polyps are not cancerous, they are usually removed and then tested for cancer. Screening for colon cancer saves lives because the cancer can usually be cured if it is caught early. If you have a polyp that is the type that can turn into cancer, you may need more tests to examine your entire colon. The doctor will remove any other polyps that he or she finds, and you will be tested more often. Follow-up care is a key part of your treatment and safety. Be sure to make and go to all appointments, and call your doctor if you are having problems. It's also a good idea to know your test results and keep a list of the medicines you take. How can you care for yourself at home? Regular exams to look for colon polyps are the best way to prevent polyps from turning into colon cancer. These can include stool tests, sigmoidoscopy, colonoscopy, and CT colonography. Talk with your doctor about a testing schedule that is right for you. To prevent polyps There is no home treatment that can prevent colon polyps. But these steps may help lower your risk for cancer. · Stay active. Being active can help you get to and stay at a healthy weight. Try to exercise on most days of the week. Walking is a good choice. · Eat well. Choose a variety of vegetables, fruits, legumes (such as peas and beans), fish, poultry, and whole grains. · Do not smoke.  If you need help quitting, talk to your doctor about stop-smoking programs and medicines. These can increase your chances of quitting for good. · If you drink alcohol, limit how much you drink. Limit alcohol to 2 drinks a day for men and 1 drink a day for women. When should you call for help? Call your doctor now or seek immediate medical care if: 
? · You have severe belly pain. ? · Your stools are maroon or very bloody. ? Watch closely for changes in your health, and be sure to contact your doctor if: 
? · You have a fever. ? · You have nausea or vomiting. ? · You have a change in bowel habits (new constipation or diarrhea). ? · Your symptoms get worse or are not improving as expected. Where can you learn more? Go to http://vignesh-nini.info/. Enter 95 [id number] in the search box to learn more about \"Colon Polyps: Care Instructions. \" Current as of: May 12, 2017 Content Version: 11.4 © 2738-8879 [employer]. Care instructions adapted under license by CallYourPrice (which disclaims liability or warranty for this information). If you have questions about a medical condition or this instruction, always ask your healthcare professional. [place] [address] any warranty or liability for your use of this information. Diverticulosis: Care Instructions Your Care Instructions In diverticulosis, pouches called diverticula form in the wall of the large intestine (colon). The pouches do not cause any pain or other symptoms. Most people who have diverticulosis do not know they have it. But the pouches sometimes bleed, and if they become infected, they can cause pain and other symptoms. When this happens, it is called diverticulitis. Diverticula form when pressure pushes the wall of the colon outward at certain weak points. A diet that is too low in fiber can cause diverticula. Follow-up care is a key part of your treatment and safety.  Be sure to make and go to all appointments, and call your doctor if you are having problems. It's also a good idea to know your test results and keep a list of the medicines you take. How can you care for yourself at home? · Include fruits, leafy green vegetables, beans, and whole grains in your diet each day. These foods are high in fiber. · Take a fiber supplement, such as Citrucel or Metamucil, every day if needed. Read and follow all instructions on the label. · Drink plenty of fluids, enough so that your urine is light yellow or clear like water. If you have kidney, heart, or liver disease and have to limit fluids, talk with your doctor before you increase the amount of fluids you drink. · Get at least 30 minutes of exercise on most days of the week. Walking is a good choice. You also may want to do other activities, such as running, swimming, cycling, or playing tennis or team sports. · Cut out foods that cause gas, pain, or other symptoms. When should you call for help? Call your doctor now or seek immediate medical care if: 
? · You have belly pain. ? · You pass maroon or very bloody stools. ? · You have a fever. ? · You have nausea and vomiting. ? · You have unusual changes in your bowel movements or abdominal swelling. ? · You have burning pain when you urinate. ? · You have abnormal vaginal discharge. ? · You have shoulder pain. ? · You have cramping pain that does not get better when you have a bowel movement or pass gas. ? · You pass gas or stool from your urethra while urinating. ? Watch closely for changes in your health, and be sure to contact your doctor if you have any problems. Where can you learn more? Go to http://vignesh-nini.info/. Enter S938 in the search box to learn more about \"Diverticulosis: Care Instructions. \" Current as of: May 12, 2017 Content Version: 11.4 © 6202-3459 Healthwise, Incorporated.  Care instructions adapted under license by Amber Hagan (which disclaims liability or warranty for this information). If you have questions about a medical condition or this instruction, always ask your healthcare professional. [address] any warranty or liability for your use of this information. ACO Transitions of Care Introducing [employer] [address] offers a voluntary care coordination program to provide high quality service and care to [facility] fee-for-service beneficiaries. Marcin Bauer was designed to help you enhance your health and well-being through the following services: ? Transitions of Care  support for individuals who are transitioning from one care setting to another (example: Hospital to home). ? Chronic and Complex Care Coordination  support for individuals and caregivers of those with serious or chronic illnesses or with more than one chronic (ongoing) condition and those who take a number of different medications. If you meet specific medical criteria, a [address] [place] [address] Rd may call you directly to coordinate your care with your primary care physician and your other care providers. For questions about the [facility] programs, please, contact your physicians office. For general questions or additional information about Accountable Care Organizations: 
Please visit www.medicare.gov/acos. html or call 1-800-MEDICARE (6-232.783.5029) TTY users should call 8-143.160.9737. Introducing [facility]! Cecilio Vázquez introduces [employer] patient portal. Now you can access parts of your medical record, email your doctor's office, and request medication refills online. 1. In your internet browser, go to [email or web address] 2. Click on the First Time User? Click Here link in the Sign In box. You will see the New Member Sign Up page. 3. Enter your [employer] Access Code exactly as it appears below. You will not need to use this code after youve completed the sign-up process. If you do not sign up before the expiration date, you must request a new code. · [employer] Access Code: [email or web address] Expires: 4/13/2018 11:25 AM 
 
4. Enter the last four digits of your Social Security Number (xxxx) and Date of Birth (mm/dd/yyyy) as indicated and click Submit. You will be taken to the next sign-up page. 5. Create a [employer] ID. This will be your [employer] login ID and cannot be changed, so think of one that is secure and easy to remember. 6. Create a [employer] password. You can change your password at any time. 7. Enter your Password Reset Question and Answer. This can be used at a later time if you forget your password. 8. Enter your e-mail address. You will receive e-mail notification when new information is available in [address] 9. Click Sign Up. You can now view and download portions of your medical record. 10. Click the Download Summary menu link to download a portable copy of your medical information. If you have questions, please visit the Frequently Asked Questions section of the [employer] website. Remember, [employer] is NOT to be used for urgent needs. For medical emergencies, dial 911. Now available from your iPhone and Android! Providers Seen During Your Hospitalization Provider Specialty Primary office phone Ck Jose MD Gastroenterology [phone] Your Primary Care Physician (PCP) Primary Care Physician Office Phone Office Fax Poncho Lazar [phone] You are allergic to the following Allergen Reactions Aspirin Nausea and Vomiting Can take a coated aspirin. Ciprocinonide Swelling Crestor (Rosuvastatin) Myalgia Sulfa (Sulfonamide Antibiotics) Hives Recent Documentation Height Weight Breastfeeding? BMI OB Status Smoking Status 1.676 m 99.8 kg No 35.51 kg/m2 Postmenopausal Never Smoker Emergency Contacts Name Discharge Info Relation Home Work Mobile Sabiha Garcia DISCHARGE CAREGIVER [3] Other Relative [6] [phone] Patient Belongings The following personal items are in your possession at time of discharge: 
  Dental Appliances: None  Visual Aid: None Please provide this summary of care documentation to your next provider. Signatures-by signing, you are acknowledging that this After Visit Summary has been reviewed with you and you have received a copy. Patient Signature:  ____________________________________________________________ Date:  ____________________________________________________________  
  
Gisela Keto Provider Signature:  ____________________________________________________________ Date:  ____________________________________________________________

## 2018-02-27 NOTE — PROGRESS NOTES
Diana Jules  1950  [id number]    Situation:  Verbal report received from: Xenia Jamil  Procedure: Procedure(s):  COLONOSCOPY  ENDOSCOPIC POLYPECTOMY    Background:    Preoperative diagnosis: CONSTIPATION  Postoperative diagnosis: 1.- Mild Diverticulosis  2.- Sigmoid Colon Polyp    :  Rodríguez Eugene  Assistant(s): Endoscopy Technician-1: Celestino Mosher  Endoscopy RN-1: Fernando Carranza RN    Specimens:   ID Type Source Tests Collected by Time Destination   1 : Sigmoid Colon Polyp Preservative   Fausto Amaral MD 2/27/2018 1036 Pathology     H. Pylori  no    Assessment:  Intra-procedure medications       Anesthesia gave intra-procedure sedation and medications, see anesthesia flow sheet yes    Intravenous fluids: NS@ KVO     Vital signs stable     Abdominal assessment: round and soft     Recommendation:  Discharge patient per MD order    Family or Friend  Permission to share finding with family or friend yes

## 2018-02-27 NOTE — DISCHARGE INSTRUCTIONS
[facility]    COLON DISCHARGE INSTRUCTIONS    Freeman Sotelo  [id number]  1950    Discomfort:  Redness at IV site- apply warm compress to area; if redness or soreness persist- contact your physician  There may be a slight amount of blood passed from the rectum  Gaseous discomfort- walking, belching will help relieve any discomfort  You may not operate a vehicle for 12 hours  You may not engage in an occupation involving machinery or appliances for rest of today  You may not drink alcoholic beverages for at least 12 hours  Avoid making any critical decisions for at least 24 hour  DIET:  You may resume your regular diet - however -  remember your colon is empty and a heavy meal will produce gas. Avoid these foods:  vegetables, fried / greasy foods, carbonated drinks     ACTIVITY:  You may  resume your normal daily activities it is recommended that you spend the remainder of the day resting -  avoid any strenuous activity. CALL M.D. ANY SIGN OF:   Increasing pain, nausea, vomiting  Abdominal distension (swelling)  New increased bleeding (oral or rectal)  Fever (chills)  Pain in chest area  Bloody discharge from nose or mouth  Shortness of breath      Follow-up Instructions:   Call Dr. Ck Jose for any questions or problems at [phone] and follow up with him in 6 to 8 weeks   High fiber diet          ENDOSCOPY FINDINGS:   Your colonoscopy showed one small polyp I removed and mild diverticulosis. Telephone # [phone]      Signed By: Ck Jose MD     2/27/2018  10:43 AM       DISCHARGE SUMMARY from Nurse    The following personal items collected during your admission are returned to you:   Dental Appliance: Dental Appliances: None  Vision: Visual Aid: None  Hearing Aid:    Jewelry:    Clothing:    Other Valuables:    Valuables sent to safe: MyChart Activation    Thank you for requesting access to [employer].  Please follow the instructions below to securely access and download your online medical record. Xeneta allows you to send messages to your doctor, view your test results, renew your prescriptions, schedule appointments, and more. How Do I Sign Up? 1. In your internet browser, go to [email or web address]  2. Click on the First Time User? Click Here link in the Sign In box. You will be redirect to the New Member Sign Up page. 3. Enter your Xeneta Access Code exactly as it appears below. You will not need to use this code after youve completed the sign-up process. If you do not sign up before the expiration date, you must request a new code. Xeneta Access Code: [email or web address]  Expires: 2018 11:25 AM (This is the date your Xeneta access code will )    4. Enter the last four digits of your Social Security Number (xxxx) and Date of Birth (mm/dd/yyyy) as indicated and click Submit. You will be taken to the next sign-up page. 5. Create a Xeneta ID. This will be your Xeneta login ID and cannot be changed, so think of one that is secure and easy to remember. 6. Create a Xeneta password. You can change your password at any time. 7. Enter your Password Reset Question and Answer. This can be used at a later time if you forget your password. 8. Enter your e-mail address. You will receive e-mail notification when new information is available in [address] 9. Click Sign Up. You can now view and download portions of your medical record. 10. Click the Download Summary menu link to download a portable copy of your medical information. Additional Information    If you have questions, please visit the Frequently Asked Questions section of the Xeneta website at [email or web address] [email or web address] Remember, Xeneta is NOT to be used for urgent needs. For medical emergencies, dial 911. Colon Polyps: Care Instructions  Your Care Instructions    Colon polyps are growths in the colon or the rectum. The cause of most colon polyps is not known, and most people who get them do not have any problems. But a certain kind can turn into cancer. For this reason, regular testing for colon polyps is important for people age 48 and older and anyone who has an increased risk for colon cancer. Polyps are usually found through routine colon cancer screening tests. Although most colon polyps are not cancerous, they are usually removed and then tested for cancer. Screening for colon cancer saves lives because the cancer can usually be cured if it is caught early. If you have a polyp that is the type that can turn into cancer, you may need more tests to examine your entire colon. The doctor will remove any other polyps that he or she finds, and you will be tested more often. Follow-up care is a key part of your treatment and safety. Be sure to make and go to all appointments, and call your doctor if you are having problems. It's also a good idea to know your test results and keep a list of the medicines you take. How can you care for yourself at home? Regular exams to look for colon polyps are the best way to prevent polyps from turning into colon cancer. These can include stool tests, sigmoidoscopy, colonoscopy, and CT colonography. Talk with your doctor about a testing schedule that is right for you. To prevent polyps  There is no home treatment that can prevent colon polyps. But these steps may help lower your risk for cancer. · Stay active. Being active can help you get to and stay at a healthy weight. Try to exercise on most days of the week. Walking is a good choice. · Eat well. Choose a variety of vegetables, fruits, legumes (such as peas and beans), fish, poultry, and whole grains. · Do not smoke. If you need help quitting, talk to your doctor about stop-smoking programs and medicines. These can increase your chances of quitting for good. · If you drink alcohol, limit how much you drink.  Limit alcohol to 2 drinks a day for men and 1 drink a day for women. When should you call for help? Call your doctor now or seek immediate medical care if:  ? · You have severe belly pain. ? · Your stools are maroon or very bloody. ? Watch closely for changes in your health, and be sure to contact your doctor if:  ? · You have a fever. ? · You have nausea or vomiting. ? · You have a change in bowel habits (new constipation or diarrhea). ? · Your symptoms get worse or are not improving as expected. Where can you learn more? Go to http://vignesh-nini.info/. Enter 95 258991 in the search box to learn more about \"Colon Polyps: Care Instructions. \"  Current as of: May 12, 2017  Content Version: 11.4  © 1949-6126 [employer] Care instructions adapted under license by [employer] (which disclaims liability or warranty for this information). If you have questions about a medical condition or this instruction, always ask your healthcare professional. [place] [address] any warranty or liability for your use of this information. Diverticulosis: Care Instructions  Your Care Instructions  In diverticulosis, pouches called diverticula form in the wall of the large intestine (colon). The pouches do not cause any pain or other symptoms. Most people who have diverticulosis do not know they have it. But the pouches sometimes bleed, and if they become infected, they can cause pain and other symptoms. When this happens, it is called diverticulitis. Diverticula form when pressure pushes the wall of the colon outward at certain weak points. A diet that is too low in fiber can cause diverticula. Follow-up care is a key part of your treatment and safety. Be sure to make and go to all appointments, and call your doctor if you are having problems. It's also a good idea to know your test results and keep a list of the medicines you take.   How can you care for yourself at home?  · Include fruits, leafy green vegetables, beans, and whole grains in your diet each day. These foods are high in fiber. · Take a fiber supplement, such as Citrucel or Metamucil, every day if needed. Read and follow all instructions on the label. · Drink plenty of fluids, enough so that your urine is light yellow or clear like water. If you have kidney, heart, or liver disease and have to limit fluids, talk with your doctor before you increase the amount of fluids you drink. · Get at least 30 minutes of exercise on most days of the week. Walking is a good choice. You also may want to do other activities, such as running, swimming, cycling, or playing tennis or team sports. · Cut out foods that cause gas, pain, or other symptoms. When should you call for help? Call your doctor now or seek immediate medical care if:  ? · You have belly pain. ? · You pass maroon or very bloody stools. ? · You have a fever. ? · You have nausea and vomiting. ? · You have unusual changes in your bowel movements or abdominal swelling. ? · You have burning pain when you urinate. ? · You have abnormal vaginal discharge. ? · You have shoulder pain. ? · You have cramping pain that does not get better when you have a bowel movement or pass gas. ? · You pass gas or stool from your urethra while urinating. ? Watch closely for changes in your health, and be sure to contact your doctor if you have any problems. Where can you learn more? Go to http://vignesh-nini.info/. Enter M897 in the search box to learn more about \"Diverticulosis: Care Instructions. \"  Current as of: May 12, 2017  Content Version: 11.4  © 1026-9690 [employer] Care instructions adapted under license by [employer] (which disclaims liability or warranty for this information).  If you have questions about a medical condition or this instruction, always ask your healthcare professional. [employer] disclaims any warranty or liability for your use of this information.

## 2018-06-07 ENCOUNTER — TELEPHONE (OUTPATIENT)
Dept: FAMILY MEDICINE CLINIC | Age: 68
End: 2018-06-07

## 2018-06-07 NOTE — TELEPHONE ENCOUNTER
----- Message from Uzma Lopez sent at 6/7/2018  4:12 PM EDT -----  Regarding: Dr. Jessica Almendarez H/[phone]   Pt would like a call back with the name and phone# of her OBGYN (on file).

## 2018-06-07 NOTE — TELEPHONE ENCOUNTER
----- Message from Deion Padilla sent at 6/7/2018  4:05 PM EDT -----  Regarding: Dr. Ruiz Barba H/[phone]  Pt would like a call back with an appt for tomorrow, 06/08/18 due to swelling both lower legs and feet for the past wk.

## 2018-06-07 NOTE — TELEPHONE ENCOUNTER
Called and spoke with patient. Advised that She saw Dr. Hines Ours at [facility] [place].

## 2018-06-08 ENCOUNTER — OFFICE VISIT (OUTPATIENT)
Dept: FAMILY MEDICINE CLINIC | Age: 68
End: 2018-06-08

## 2018-06-08 VITALS
BODY MASS INDEX: 36.48 KG/M2 | WEIGHT: 227 LBS | OXYGEN SATURATION: 96 % | SYSTOLIC BLOOD PRESSURE: 148 MMHG | HEIGHT: 66 IN | RESPIRATION RATE: 18 BRPM | HEART RATE: 88 BPM | TEMPERATURE: 98.6 F | DIASTOLIC BLOOD PRESSURE: 79 MMHG

## 2018-06-08 DIAGNOSIS — E66.01 SEVERE OBESITY (BMI 35.0-39.9): ICD-10-CM

## 2018-06-08 DIAGNOSIS — R60.0 PEDAL EDEMA: Primary | ICD-10-CM

## 2018-06-08 RX ORDER — FUROSEMIDE 40 MG/1
40 TABLET ORAL
Qty: 30 TAB | Refills: 0
Start: 2018-06-08 | End: 2020-06-17

## 2018-06-08 RX ORDER — LATANOPROST 50 UG/ML
1 SOLUTION/ DROPS OPHTHALMIC
Refills: 0 | COMMUNITY
Start: 2018-05-20

## 2018-06-08 NOTE — PROGRESS NOTES
Chief Complaint   Patient presents with    Foot Swelling     x 2 weeks     1. Have you been to the ER, urgent care clinic since your last visit? Hospitalized since your last visit? No    2. Have you seen or consulted any other health care providers outside of the [facility] since your last visit? Include any pap smears or colon screening.  No

## 2018-06-08 NOTE — PROGRESS NOTES
HISTORY OF PRESENT ILLNESS  Yvonne Greenberg is a 78 y.o. female. HPI: Patient complaints of pedal edema x 2 weeks. It is worse after sitting for a long time. Walking helps to reduce edema. She doesn't work and sits most of the day watching TV. She denies chest pain, SOB, or cough. She is obese, doesn't watch her diet, doesn't exercise. Past Medical History:   Diagnosis Date    Allergic rhinitis     DDD (degenerative disc disease)     thoracic spine    DJD (degenerative joint disease) of cervical spine     Dyslipidemia, goal LDL below 100 9/29/2017    Eczema     Hypertension     Ill-defined condition     bulging disc/pinched nerve    [place]hen sclerosus     [place] [facility] [place]    ANMOL (obstructive sleep apnea)     CPAP - does not use regularly    Other and unspecified hyperlipidemia     Reflux esophagitis     Situational anxiety 9/29/2017    Type II or unspecified type diabetes mellitus without mention of complication, not stated as uncontrolled      Past Surgical History:   Procedure Laterality Date    COLONOSCOPY N/A 2/27/2018    COLON[facility]ri [facility]cd[facility]nell [facility] ENDOSCOPY    HX BACK SURGERY      x 3 - total of 3 back surgeries as of 2/26/2018    HX COLONOSCOPY  2013    HX GYN      benign tumors removed from uterus    HX HEENT      sinus surgery for snoring    HX HIP REPLACEMENT  2004    rt    HX ORTHOPAEDIC Bilateral July 2013    hip replacement    HX TONSIL AND ADENOIDECTOMY      pt does not think her adenoids were removed    HX TUBAL LIGATION      PALATOPHAYNGOPLASTY  2002    ME DCMPRN PERQ NUCLEUS PULPOSUS 1/> LEVELS LUMBAR  2003    L3-4     Allergies   Allergen Reactions    Aspirin Nausea and Vomiting     Can take a coated aspirin.     Ciprocinonide Swelling    Crestor [Rosuvastatin] Myalgia    Sulfa (Sulfonamide Antibiotics) Hives     Current Outpatient Prescriptions:     furosemide (LASIX) 40 mg tablet, Take 1 Tab by mouth nightly., Disp: 30 Tab, Rfl: 0   ENTERIC COATED ASPIRIN PO, Take 81 mg by mouth nightly., Disp: , Rfl:     CALCIUM CARBONATE (TUMS PO), Take 2 Tabs by mouth two (2) times a day., Disp: , Rfl:     atorvastatin (LIPITOR) 40 mg tablet, Take 40 mg by mouth nightly., Disp: , Rfl:     glimepiride (AMARYL) 4 mg tablet, Take 4 mg by mouth nightly., Disp: , Rfl:     CHOLECALCIFEROL, VITAMIN D3, (VITAMIN D3 PO), Take 2,000 Units by mouth daily. , Disp: , Rfl:     insulin aspart U-100 (NOVOLOG FLEXPEN U-100 INSULIN) 100 unit/mL inpn, 14-18 Units by SubCUTAneous route daily (with dinner). , Disp: , Rfl:     insulin glargine (TOUJEO SOLOSTAR U-300 INSULIN) 300 unit/mL (1.5 mL) inpn, 68 Units by SubCUTAneous route nightly., Disp: , Rfl:     LATANOPROST OP, Administer 1 Drop to both eyes every evening., Disp: , Rfl:     polyethylene glycol (MIRALAX) 17 gram/dose powder, Take 17 g by mouth daily. , Disp: , Rfl:     valsartan (DIOVAN) 80 mg tablet, Take 80 mg by mouth nightly., Disp: , Rfl:     BD INSULIN PEN NEEDLE UF MINI 31 gauge x 3/16\" ndle, use four times a day for INSULIN INJECTIONS as directed, Disp: , Rfl: 1    insulin aspart (NOVOLOG FLEXPEN) 100 unit/mL inpn, 14-16 Units by SubCUTAneous route daily (with lunch). , Disp: , Rfl:     ONETOUCH ULTRA TEST strip, , Disp: , Rfl: 0    halobetasol (ULTRAVATE) 0.05 % ointment, as needed. , Disp: , Rfl: 0    metFORMIN (GLUCOPHAGE) 500 mg tablet, Take 1,000 mg by mouth two (2) times daily (with meals). , Disp: , Rfl:     latanoprost (XALATAN) 0.005 % ophthalmic solution, , Disp: , Rfl: 0  Review of Systems   Constitutional: Negative. Respiratory: Negative. Cardiovascular: Positive for leg swelling. Negative for chest pain, palpitations and orthopnea. Gastrointestinal: Negative. Blood pressure 148/79, pulse 88, temperature 98.6 °F (37 °C), temperature source Oral, resp. rate 18, height 5' 6\" (1.676 m), weight 227 lb (103 kg), last menstrual period 09/23/2009, SpO2 96 %.       Body mass index is 36.64 kg/(m^2). Physical Exam   Constitutional: No distress. HENT:   Mouth/Throat: Oropharynx is clear and moist.   Neck: Normal range of motion. Neck supple. Cardiovascular: Normal rate and regular rhythm. No murmur heard. Bilateral (1 to 2) pedal edema   Pulmonary/Chest: Effort normal and breath sounds normal.   Abdominal: Soft. Bowel sounds are normal.   Nursing note and vitals reviewed. ASSESSMENT and PLAN  Diagnoses and all orders for this visit:    1. Pedal edema  -     furosemide (LASIX) 40 mg tablet; Take 1 Tab by mouth nightly.     2. Severe obesity (BMI 35.0-39.9) (HCC)       Normal BMI discussed, advised to watch diet and exercise    Follow up in two weeks  Pt was given an after visit summary which includes diagnosis, current medicines and vital and voiced understanding of treatment plan

## 2018-08-28 PROBLEM — D12.6 COLON ADENOMA: Status: ACTIVE | Noted: 2018-08-28

## 2018-10-15 ENCOUNTER — HOSPITAL ENCOUNTER (OUTPATIENT)
Dept: MAMMOGRAPHY | Age: 68
Discharge: HOME | End: 2018-10-15
Attending: FAMILY MEDICINE
Payer: MEDICARE

## 2018-10-15 DIAGNOSIS — Z12.39 SCREENING BREAST EXAMINATION: ICD-10-CM

## 2018-10-15 PROCEDURE — 77063 BREAST TOMOSYNTHESIS BI: CPT

## 2019-01-09 RX ORDER — LOSARTAN POTASSIUM AND HYDROCHLOROTHIAZIDE 25; 100 MG/1; MG/1
1 TABLET ORAL
COMMUNITY
End: 2019-02-19

## 2019-01-09 NOTE — PERIOP NOTES
[address]                  
[address] [facility], [address] [facility] MAIN OR                                  [phone] MAIN PRE OP                          [phone]                                                                                AMBULATORY PRE OP          [phone] PRE-ADMISSION TESTING    21  Surgery Date:  1/18/2019 Is surgery arrival time given by surgeon? NO If [facility] staff will call you between 3 and 7pm the day before your surgery with your arrival time. (If your surgery is on a Monday, we will call you the Friday before.) Call [phone] after 7pm Monday-Friday if you did not receive your arrival time. INSTRUCTIONS BEFORE YOUR SURGERY When You 
Arrive Arrive at the 2nd [address] [facility] on the day of your surgery Have your insurance card, photo ID, and any copayment (if needed) Food 
 and  
Drink NO food or drink after midnight the night before surgery This means NO water, gum, mints, coffee, juice, etc. 
No alcohol (beer, wine, liquor) 24 hours before and after surgery Medications to TAKE Morning of Surgery MEDICATIONS TO TAKE THE MORNING OF SURGERY WITH A SIP OF WATER:  
None Bring your CPAP to hospital with you Medications To 
STOP      7 days before surgery ? Non-Steroidal anti-inflammatory Drugs (NSAID's): for example, Ibuprofen (Advil, Motrin), Naproxen (Aleve) ? Aspirin, if taking for pain ? Herbal supplements, vitamins, and fish oil 
? Other: 
(Pain medications not listed above, including Tylenol may be taken) Blood Thinners ? If you take  Aspirin, Plavix, Coumadin, or any blood-thinning or anti-blood clot medicine, talk to the doctor who prescribed the medications for pre-operative instructions. Bathing Clothing Jewelry Valuables ?   If you shower the morning of surgery, please do not apply anything to your skin (lotions, powders, deodorant, or makeup, especially mascara) ? Follow all special bath instructions (for total joint replacement, spine and bowel surgeries) ? Do not shave or trim anywhere 24 hours before surgery ? Wear your hair loose or down; no pony-tails, buns, or metal hair clips ? Wear loose, comfortable, clean clothes ? Wear glasses instead of contacts ? Leave money, valuables, and jewelry, including body piercings, at home Going Home       or Spending the Night ? SAME-DAY SURGERY: You must have a responsible adult drive you home and stay with you 24 hours after surgery ? ADMITS: If your doctor is keeping you into the hospital after surgery, leave personal belongings/luggage in your car until you have a hospital room number. Hospital discharge time is 12 noon Drivers must be here before 12 noon unless you are told differently Special Instructions Diabetic patients: 
If allowed by your surgeon, eat a good protein snack before midnight the night before your surgery Call Dr Padma Bhatia ask how much long acting insulin to take the night before surgery DO NOT TAKE ANY DIABETIC MEDICINE THE MORNING OF SURGERY Check your blood sugar the morning of surgery and if it is low you may use glucose tabs Bring glucose tabs with you on way to hospital incase your sugar is low on ride to hospital 
 
  
 
 
Follow all instructions so your surgery wont be cancelled. Please, be on time. If a situation occurs and you are delayed the day of surgery, call [phone]. If your physical condition changes (like a fever, cold, flu, etc.) call your surgeon. The patient was contacted  via phone. The patient verbalizes understanding of all instructions and does not  need reinforcement.

## 2019-01-15 ENCOUNTER — ANESTHESIA EVENT (OUTPATIENT)
Dept: SURGERY | Age: 69
End: 2019-01-15
Payer: MEDICARE

## 2019-01-16 ENCOUNTER — ANESTHESIA (OUTPATIENT)
Dept: SURGERY | Age: 69
End: 2019-01-16
Payer: MEDICARE

## 2019-01-16 ENCOUNTER — HOSPITAL ENCOUNTER (OUTPATIENT)
Age: 69
Setting detail: OUTPATIENT SURGERY
Discharge: HOME | End: 2019-01-16
Attending: OBSTETRICS & GYNECOLOGY | Admitting: OBSTETRICS & GYNECOLOGY
Payer: MEDICARE

## 2019-01-16 VITALS
HEART RATE: 95 BPM | WEIGHT: 220 LBS | OXYGEN SATURATION: 97 % | RESPIRATION RATE: 14 BRPM | TEMPERATURE: 97.7 F | HEIGHT: 66 IN | DIASTOLIC BLOOD PRESSURE: 63 MMHG | BODY MASS INDEX: 35.36 KG/M2 | SYSTOLIC BLOOD PRESSURE: 158 MMHG

## 2019-01-16 PROBLEM — N95.0 POSTMENOPAUSAL BLEEDING: Status: ACTIVE | Noted: 2019-01-16

## 2019-01-16 LAB
ABO + RH BLD: NORMAL
ANION GAP SERPL CALC-SCNC: 11 MMOL/L (ref 5–15)
BASOPHILS # BLD: 0 K/UL (ref 0–0.1)
BASOPHILS NFR BLD: 0 % (ref 0–1)
BLOOD GROUP ANTIBODIES SERPL: NORMAL
BUN SERPL-MCNC: 15 MG/DL (ref 6–20)
BUN/CREAT SERPL: 13 (ref 12–20)
CALCIUM SERPL-MCNC: 9.4 MG/DL (ref 8.5–10.1)
CHLORIDE SERPL-SCNC: 101 MMOL/L (ref 97–108)
CO2 SERPL-SCNC: 29 MMOL/L (ref 21–32)
CREAT SERPL-MCNC: 1.2 MG/DL (ref 0.55–1.02)
DIFFERENTIAL METHOD BLD: ABNORMAL
EOSINOPHIL # BLD: 0.1 K/UL (ref 0–0.4)
EOSINOPHIL NFR BLD: 1 % (ref 0–7)
ERYTHROCYTE [DISTWIDTH] IN BLOOD BY AUTOMATED COUNT: 16 % (ref 11.5–14.5)
GLUCOSE BLD STRIP.AUTO-MCNC: 179 MG/DL (ref 65–100)
GLUCOSE BLD STRIP.AUTO-MCNC: 209 MG/DL (ref 65–100)
GLUCOSE SERPL-MCNC: 210 MG/DL (ref 65–100)
HCT VFR BLD AUTO: 37.2 % (ref 35–47)
HGB BLD-MCNC: 11.3 G/DL (ref 11.5–16)
IMM GRANULOCYTES # BLD AUTO: 0 K/UL (ref 0–0.04)
IMM GRANULOCYTES NFR BLD AUTO: 0 % (ref 0–0.5)
LYMPHOCYTES # BLD: 1.6 K/UL (ref 0.8–3.5)
LYMPHOCYTES NFR BLD: 16 % (ref 12–49)
MCH RBC QN AUTO: 24.1 PG (ref 26–34)
MCHC RBC AUTO-ENTMCNC: 30.4 G/DL (ref 30–36.5)
MCV RBC AUTO: 79.5 FL (ref 80–99)
MONOCYTES # BLD: 0.6 K/UL (ref 0–1)
MONOCYTES NFR BLD: 6 % (ref 5–13)
NEUTS SEG # BLD: 7.8 K/UL (ref 1.8–8)
NEUTS SEG NFR BLD: 76 % (ref 32–75)
NRBC # BLD: 0 K/UL (ref 0–0.01)
NRBC BLD-RTO: 0 PER 100 WBC
PLATELET # BLD AUTO: 307 K/UL (ref 150–400)
PMV BLD AUTO: 10.6 FL (ref 8.9–12.9)
POTASSIUM SERPL-SCNC: 4.1 MMOL/L (ref 3.5–5.1)
RBC # BLD AUTO: 4.68 M/UL (ref 3.8–5.2)
SERVICE CMNT-IMP: ABNORMAL
SERVICE CMNT-IMP: ABNORMAL
SODIUM SERPL-SCNC: 141 MMOL/L (ref 136–145)
SPECIMEN EXP DATE BLD: NORMAL
WBC # BLD AUTO: 10.2 K/UL (ref 3.6–11)

## 2019-01-16 PROCEDURE — 77030020782 HC GWN BAIR PAWS FLX 3M -B

## 2019-01-16 PROCEDURE — 80048 BASIC METABOLIC PNL TOTAL CA: CPT

## 2019-01-16 PROCEDURE — 82962 GLUCOSE BLOOD TEST: CPT

## 2019-01-16 PROCEDURE — 77030033137 HC TBNG OUTFLO AQUILEX ST HOLO -B: Performed by: OBSTETRICS & GYNECOLOGY

## 2019-01-16 PROCEDURE — 77030005537 HC CATH URETH BARD -A: Performed by: OBSTETRICS & GYNECOLOGY

## 2019-01-16 PROCEDURE — 76210000021 HC REC RM PH II 0.5 TO 1 HR: Performed by: OBSTETRICS & GYNECOLOGY

## 2019-01-16 PROCEDURE — 77030032490 HC SLV COMPR SCD KNE COVD -B

## 2019-01-16 PROCEDURE — 86900 BLOOD TYPING SEROLOGIC ABO: CPT

## 2019-01-16 PROCEDURE — 77030018836 HC SOL IRR NACL ICUM -A: Performed by: OBSTETRICS & GYNECOLOGY

## 2019-01-16 PROCEDURE — 77030033136 HC TBNG INFLO AQUILEX ST HOLO -C: Performed by: OBSTETRICS & GYNECOLOGY

## 2019-01-16 PROCEDURE — 85025 COMPLETE CBC W/AUTO DIFF WBC: CPT

## 2019-01-16 PROCEDURE — 77030026438 HC STYL ET INTUB CARD -A: Performed by: ANESTHESIOLOGY

## 2019-01-16 PROCEDURE — 74011000250 HC RX REV CODE- 250

## 2019-01-16 PROCEDURE — 74011250636 HC RX REV CODE- 250/636: Performed by: ANESTHESIOLOGY

## 2019-01-16 PROCEDURE — 74011250636 HC RX REV CODE- 250/636

## 2019-01-16 PROCEDURE — 77030003666 HC NDL SPINAL BD -A: Performed by: OBSTETRICS & GYNECOLOGY

## 2019-01-16 PROCEDURE — 76210000006 HC OR PH I REC 0.5 TO 1 HR: Performed by: OBSTETRICS & GYNECOLOGY

## 2019-01-16 PROCEDURE — 76010000138 HC OR TIME 0.5 TO 1 HR: Performed by: OBSTETRICS & GYNECOLOGY

## 2019-01-16 PROCEDURE — 36415 COLL VENOUS BLD VENIPUNCTURE: CPT

## 2019-01-16 PROCEDURE — 88305 TISSUE EXAM BY PATHOLOGIST: CPT

## 2019-01-16 PROCEDURE — 76060000032 HC ANESTHESIA 0.5 TO 1 HR: Performed by: OBSTETRICS & GYNECOLOGY

## 2019-01-16 PROCEDURE — 77030032490 HC SLV COMPR SCD KNE COVD -B: Performed by: OBSTETRICS & GYNECOLOGY

## 2019-01-16 PROCEDURE — 77030008684 HC TU ET CUF COVD -B: Performed by: ANESTHESIOLOGY

## 2019-01-16 RX ORDER — IBUPROFEN 800 MG/1
800 TABLET ORAL
Qty: 20 TAB | Refills: 1 | Status: SHIPPED | OUTPATIENT
Start: 2019-01-16 | End: 2019-12-18

## 2019-01-16 RX ORDER — SODIUM CHLORIDE, SODIUM LACTATE, POTASSIUM CHLORIDE, CALCIUM CHLORIDE 600; 310; 30; 20 MG/100ML; MG/100ML; MG/100ML; MG/100ML
150 INJECTION, SOLUTION INTRAVENOUS CONTINUOUS
Status: DISCONTINUED | OUTPATIENT
Start: 2019-01-16 | End: 2019-01-16 | Stop reason: HOSPADM

## 2019-01-16 RX ORDER — ALBUTEROL SULFATE 0.83 MG/ML
2.5 SOLUTION RESPIRATORY (INHALATION) AS NEEDED
Status: DISCONTINUED | OUTPATIENT
Start: 2019-01-16 | End: 2019-01-16 | Stop reason: HOSPADM

## 2019-01-16 RX ORDER — SODIUM CHLORIDE, SODIUM LACTATE, POTASSIUM CHLORIDE, CALCIUM CHLORIDE 600; 310; 30; 20 MG/100ML; MG/100ML; MG/100ML; MG/100ML
125 INJECTION, SOLUTION INTRAVENOUS CONTINUOUS
Status: DISCONTINUED | OUTPATIENT
Start: 2019-01-16 | End: 2019-01-16 | Stop reason: HOSPADM

## 2019-01-16 RX ORDER — DIPHENHYDRAMINE HYDROCHLORIDE 50 MG/ML
12.5 INJECTION, SOLUTION INTRAMUSCULAR; INTRAVENOUS AS NEEDED
Status: DISCONTINUED | OUTPATIENT
Start: 2019-01-16 | End: 2019-01-16 | Stop reason: HOSPADM

## 2019-01-16 RX ORDER — FENTANYL CITRATE 50 UG/ML
INJECTION, SOLUTION INTRAMUSCULAR; INTRAVENOUS AS NEEDED
Status: DISCONTINUED | OUTPATIENT
Start: 2019-01-16 | End: 2019-01-16 | Stop reason: HOSPADM

## 2019-01-16 RX ORDER — SUCCINYLCHOLINE CHLORIDE 20 MG/ML
INJECTION INTRAMUSCULAR; INTRAVENOUS AS NEEDED
Status: DISCONTINUED | OUTPATIENT
Start: 2019-01-16 | End: 2019-01-16 | Stop reason: HOSPADM

## 2019-01-16 RX ORDER — MIDAZOLAM HYDROCHLORIDE 1 MG/ML
INJECTION, SOLUTION INTRAMUSCULAR; INTRAVENOUS AS NEEDED
Status: DISCONTINUED | OUTPATIENT
Start: 2019-01-16 | End: 2019-01-16 | Stop reason: HOSPADM

## 2019-01-16 RX ORDER — PROPOFOL 10 MG/ML
INJECTION, EMULSION INTRAVENOUS AS NEEDED
Status: DISCONTINUED | OUTPATIENT
Start: 2019-01-16 | End: 2019-01-16 | Stop reason: HOSPADM

## 2019-01-16 RX ORDER — LIDOCAINE HYDROCHLORIDE 10 MG/ML
0.1 INJECTION, SOLUTION EPIDURAL; INFILTRATION; INTRACAUDAL; PERINEURAL AS NEEDED
Status: DISCONTINUED | OUTPATIENT
Start: 2019-01-16 | End: 2019-01-16 | Stop reason: HOSPADM

## 2019-01-16 RX ORDER — HYDROMORPHONE HYDROCHLORIDE 1 MG/ML
0.5 INJECTION, SOLUTION INTRAMUSCULAR; INTRAVENOUS; SUBCUTANEOUS
Status: DISCONTINUED | OUTPATIENT
Start: 2019-01-16 | End: 2019-01-16 | Stop reason: HOSPADM

## 2019-01-16 RX ORDER — IBUPROFEN 800 MG/1
800 TABLET ORAL
Qty: 20 TAB | Refills: 1 | OUTPATIENT
Start: 2019-01-16 | End: 2019-01-16 | Stop reason: SDUPTHER

## 2019-01-16 RX ORDER — ONDANSETRON 2 MG/ML
4 INJECTION INTRAMUSCULAR; INTRAVENOUS AS NEEDED
Status: DISCONTINUED | OUTPATIENT
Start: 2019-01-16 | End: 2019-01-16 | Stop reason: HOSPADM

## 2019-01-16 RX ORDER — LIDOCAINE HYDROCHLORIDE 20 MG/ML
INJECTION, SOLUTION EPIDURAL; INFILTRATION; INTRACAUDAL; PERINEURAL AS NEEDED
Status: DISCONTINUED | OUTPATIENT
Start: 2019-01-16 | End: 2019-01-16 | Stop reason: HOSPADM

## 2019-01-16 RX ORDER — ROCURONIUM BROMIDE 10 MG/ML
INJECTION, SOLUTION INTRAVENOUS AS NEEDED
Status: DISCONTINUED | OUTPATIENT
Start: 2019-01-16 | End: 2019-01-16 | Stop reason: HOSPADM

## 2019-01-16 RX ORDER — ONDANSETRON 2 MG/ML
INJECTION INTRAMUSCULAR; INTRAVENOUS AS NEEDED
Status: DISCONTINUED | OUTPATIENT
Start: 2019-01-16 | End: 2019-01-16 | Stop reason: HOSPADM

## 2019-01-16 RX ADMIN — SUCCINYLCHOLINE CHLORIDE 20 MG: 20 INJECTION INTRAMUSCULAR; INTRAVENOUS at 12:49

## 2019-01-16 RX ADMIN — FENTANYL CITRATE 50 MCG: 50 INJECTION, SOLUTION INTRAMUSCULAR; INTRAVENOUS at 12:37

## 2019-01-16 RX ADMIN — FENTANYL CITRATE 50 MCG: 50 INJECTION, SOLUTION INTRAMUSCULAR; INTRAVENOUS at 12:30

## 2019-01-16 RX ADMIN — SODIUM CHLORIDE, SODIUM LACTATE, POTASSIUM CHLORIDE, AND CALCIUM CHLORIDE 150 ML/HR: 600; 310; 30; 20 INJECTION, SOLUTION INTRAVENOUS at 10:36

## 2019-01-16 RX ADMIN — ONDANSETRON 4 MG: 2 INJECTION INTRAMUSCULAR; INTRAVENOUS at 13:02

## 2019-01-16 RX ADMIN — SODIUM CHLORIDE, SODIUM LACTATE, POTASSIUM CHLORIDE, AND CALCIUM CHLORIDE: 600; 310; 30; 20 INJECTION, SOLUTION INTRAVENOUS at 13:00

## 2019-01-16 RX ADMIN — PROPOFOL 40 MG: 10 INJECTION, EMULSION INTRAVENOUS at 12:47

## 2019-01-16 RX ADMIN — PROPOFOL 40 MG: 10 INJECTION, EMULSION INTRAVENOUS at 12:53

## 2019-01-16 RX ADMIN — SUCCINYLCHOLINE CHLORIDE 40 MG: 20 INJECTION INTRAMUSCULAR; INTRAVENOUS at 12:32

## 2019-01-16 RX ADMIN — PROPOFOL 40 MG: 10 INJECTION, EMULSION INTRAVENOUS at 12:58

## 2019-01-16 RX ADMIN — LIDOCAINE HYDROCHLORIDE 60 MG: 20 INJECTION, SOLUTION EPIDURAL; INFILTRATION; INTRACAUDAL; PERINEURAL at 12:59

## 2019-01-16 RX ADMIN — LIDOCAINE HYDROCHLORIDE 60 MG: 20 INJECTION, SOLUTION EPIDURAL; INFILTRATION; INTRACAUDAL; PERINEURAL at 12:30

## 2019-01-16 RX ADMIN — PROPOFOL 20 MG: 10 INJECTION, EMULSION INTRAVENOUS at 12:49

## 2019-01-16 RX ADMIN — ROCURONIUM BROMIDE 10 MG: 10 INJECTION, SOLUTION INTRAVENOUS at 12:31

## 2019-01-16 RX ADMIN — LIDOCAINE HYDROCHLORIDE 60 MG: 20 INJECTION, SOLUTION EPIDURAL; INFILTRATION; INTRACAUDAL; PERINEURAL at 12:31

## 2019-01-16 RX ADMIN — PROPOFOL 200 MG: 10 INJECTION, EMULSION INTRAVENOUS at 12:31

## 2019-01-16 RX ADMIN — MIDAZOLAM HYDROCHLORIDE 2 MG: 1 INJECTION, SOLUTION INTRAMUSCULAR; INTRAVENOUS at 12:26

## 2019-01-16 RX ADMIN — SUCCINYLCHOLINE CHLORIDE 100 MG: 20 INJECTION INTRAMUSCULAR; INTRAVENOUS at 12:31

## 2019-01-16 NOTE — OP NOTES
HYSTEROSCOPY D & C  FULL OP NOTE        DATE OF PROCEDURE:  1/16/2019    PREOPERATIVE DIAGNOSIS:  POST MENOPAUSAL BLEEDING    POSTOPERATIVE DIAGNOSIS:  POST MENOPAUSAL BLEEDING    PROCEDURE:  Hysteroscopy, D&C    SURGEON:  Martin Wright MD    ASSISTANT:  None    ANESTHESIA: General endotracheal anesthesia. EBL:    Minimal    Net fluid deficit: 300 mL    FINDINGS: Stenotic cervix, no evidence of uterine polyp    Specimen:  Endometrial curettings    PROCEDURE: Patient was placed on the operating table in the supine position. Time out was done to confirm the operating procedure, surgeon, patient and site. Once confirmed by the team, procedure was started. Patient was placed under General. She was prepped and draped in the usual fashion for vaginal surgery. Cervix was visualized with the aid of a weighted vaginal speculum and grasped with a single-tooth tenaculum. The external os was noted to be extremely stenotic. Serial lacrimal duct dilators were used to dilate the cervix followed by a hemostatic which allowed for the smallest Garcia dilator to be inserted. The cervix was then dilated to 23-Romanian and uterus sounded to 7cm. The hysteroscope was inserted and the uterus had fluffy endometrial tissue noted and no obvious polyp though visualization was difficult due to the tissue. A sharp curetting was performed and endometrial curettings were sent to pathology. There was no bleeding. Instruments were removed. The patient went to the recovery room in satisfactory condition.

## 2019-01-16 NOTE — ANESTHESIA PREPROCEDURE EVALUATION
Anesthetic History No history of anesthetic complications Review of Systems / Medical History Patient summary reviewed, nursing notes reviewed and pertinent labs reviewed Pulmonary Sleep apnea: CPAP Neuro/Psych Within defined limits Cardiovascular Hypertension: well controlled Hyperlipidemia GI/Hepatic/Renal 
  
GERD: well controlled Endo/Other Diabetes: well controlled, type 2, using insulin Morbid obesity and arthritis Other Findings Physical Exam 
 
Airway Mallampati: III 
TM Distance: 4 - 6 cm Neck ROM: normal range of motion Mouth opening: Normal 
 
 Cardiovascular Regular rate and rhythm,  S1 and S2 normal,  no murmur, click, rub, or gallop Rhythm: regular Rate: normal 
 
 
 
 Dental 
No notable dental hx Pulmonary Breath sounds clear to auscultation Abdominal 
GI exam deferred Other Findings Anesthetic Plan ASA: 3 Anesthesia type: general 
 
 
 
 
Induction: Intravenous Anesthetic plan and risks discussed with: Patient

## 2019-01-16 NOTE — DISCHARGE INSTRUCTIONS
After Care Instructions For Your D&C      1. You may resume your usual diet once the nausea resolves. Initially, try sips of warm fluids and a bland diet. 2. Avoid heavy lifting and straining. Gradually increase your activity. First, try walking and doing light activity around the house. Resume your normal habits if no significant discomfort or bleeding develops. Most women can return to work within one to four days after this procedure. 3. You may take showers. Avoid using a tub bath, swimming pool or hot tub until after your check-up. 4. Do not place anything in your vagina until after your postoperative visit. Do not   douche, use tampons, or have intercourse because this may cause bleeding and   infection. 5. You may initially experience a heavy bloody discharge. This should not be more than your menstrual flow. Over the next several days, the flow should steadily decrease. 6. Typically following the procedure, there is little or no pain. You may feel cramps in your lower abdomen. Tylenol may relieve mild cramping. If pain medication does not improve your symptoms, you should contact your physician. 7. Contact the office if you have excessive bleeding (saturating a pad an hour for two hours or passing large clots). It is also necessary to speak with your physician if you develop chills, a temperature greater than 100.4, difficulty voiding or burning on urination. 8. Your physician may want to see you in the office after your D&C. Please call for an appointment if this has not already been arranged. Our office phone number is [phone]. If appropriate, the microscopic results from your procedure will be discussed at this follow-up visit.           DISCHARGE SUMMARY from your Nurse    The following personal items collected during your admission are returned to you:   Dental Appliance: Dental Appliances: None  Vision: Visual Aid: Glasses(reading)  Hearing Aid:    Jewelry: Jewelry: None  Clothing: Clothing: Other (comment)(clothes, coat, and purse in locker)  Other Valuables:    Valuables sent to safe: Personal Items Sent to Safe: Money, cards and cell phone    PATIENT INSTRUCTIONS:    After general anesthesia or intravenous sedation, for 24 hours or while taking prescription Narcotics:  · Limit your activities  · Do not drive and operate hazardous machinery  · Do not make important personal or business decisions  · Do  not drink alcoholic beverages  · If you have not urinated within 8 hours after discharge, please contact your surgeon on call. Report the following to your surgeon:  · Excessive pain, swelling, redness or odor of or around the surgical area  · Temperature over 100.5  · Nausea and vomiting lasting longer than 4 hours or if unable to take medications  · Any signs of decreased circulation or nerve impairment to extremity: change in color, persistent  numbness, tingling, coldness or increase pain  · Any questions    COUGH AND DEEP BREATHE    Breathing deep and coughing are very important exercises to do after surgery. Deep breathing and coughing open the little air tubes and air sacks in your lungs. You take deep breaths every day. You may not even notice - it is just something you do when you sigh or yawn. It is a natural exercise you do to keep these air passages open. After surgery, take deep breaths and cough, on purpose. Coughing and deep breathing help prevent bronchitis and pneumonia after surgery. If you had chest or belly surgery, use a pillow as a \"hug buddy\" and hold it tightly to your chest or belly when you cough. DIRECTIONS:  6. Take 10 to 15 slow deep breaths every hour while awake. 7. Breathe in deeply, and hold it for 2 seconds. 8. Exhale slowly through puckered lips, like blowing up a balloon. 9. After every 4th or 5th deep breath, hug your pillow to your chest or belly and give a hard, deep cough. Yes, it will probably hurt.   But doing this exercise is very important part of healing after surgery. Take your pain medicine to help you do this exercise without too much pain. IF YOU HAVE BEEN DIAGNOSED WITH SLEEP APNEA, PLEASE USE YOUR SLEEP APNEA DEVICE OR CPAP MACHINE WHEN YOU INTEND TO NAP AFTER TAKING PAIN MEDICATION. Ankle Pumps    Ankle pumps increase the circulation of oxygenated blood to your lower extremities and decrease your risk for circulation problems such as blood clots. They also stretch the muscles, tendons and ligaments in your foot and ankle, and prevent joint contracture in the ankle and foot, especially after surgeries on the legs. It is important to do ankle pump exercises regularly after surgery because immobility increases your risk for developing a blood clot. Your doctor may also have you take an Aspirin for the next few days as well. If your doctor did not ask you to take an Aspirin, consult with him before starting Aspirin therapy on your own. Slowly point your foot forward, feeling the muscles on the top of your lower leg stretch, and hold this position for 5 seconds. Next, pull your foot back toward you as far as possible, stretching the calf muscles, and hold that position for 5 seconds. Repeat with the other foot. Perform 10 repetitions every hour while awake for both ankles if possible (down and then up with the foot once is one repetition). You should feel gentle stretching of the muscles in your lower leg when doing this exercise. If you feel pain, or your range of motion is limited, don't  Push too hard. Only go the limit your joint and muscles will let you go. If you have increasing pain, progressively worsening leg warmth or swelling, STOP the exercise and call your doctor.      Below is information about the medications your doctor is prescribing after your visit:    Other information in your discharge envelope:  []     PRESCRIPTIONS  []     PHYSICAL THERAPY PRESCRIPTION  []     APPOINTMENT CARDS  []     Regional Anesthesia Pamphlet for block or block with On-Q Catheter from Anesthesia Service  []     Medical device information sheets/pamphlets from their    []     School/work excuse note. []     /parent work excuse note. These are general instructions for a healthy lifestyle:    *  Please give a list of your current medications to your Primary Care Provider. *  Please update this list whenever your medications are discontinued, doses are      changed, or new medications (including over-the-counter products) are added. *  Please carry medication information at all times in case of emergency situations. About Smoking  No smoking / No tobacco products / Avoid exposure to second hand smoke    Surgeon General's Warning:  Quitting smoking now greatly reduces serious risk to your health. Obesity, smoking, and sedentary lifestyle greatly increases your risk for illness and disease. A healthy diet, regular physical exercise & weight monitoring are important for maintaining a healthy lifestyle. Congestive Heart Failure  You may be retaining fluid if you have a history of heart failure or if you experience any of the following symptoms:  Weight gain of 3 pounds or more overnight or 5 pounds in a week, increased swelling in our hands or feet or shortness of breath while lying flat in bed. Please call your doctor as soon as you notice any of these symptoms; do not wait until your next office visit. Recognize signs and symptoms of STROKE:  F - face looks uneven  A - arms unable to move or move even  S - speech slurred or non-existent  T - time-call 911 as soon as signs and symptoms begin-DO NOT go         Back to bed or wait to see if you get better-TIME IS BRAIN. Warning signs of HEART ATTACK  Call 911 if you have these symptoms    · Chest discomfort.   Most heart attacks involve discomfort in the center of the chest that lasts more than a few minutes, or that goes away and comes back. It can feel like uncomfortable pressure, squeezing, fullness, or pain. · Discomfort in other areas of the upper body. Symptoms can include pain or discomfort in one or both        Arms, the back, neck, jaw, or stomach. ·  Shortness of breath with or without chest discomfort. · Other signs may include breaking out in a cold sweat, nausea, or lightheadedness    Don't wait more than five minutes to call 911 - MINUTES MATTER! Fast action can save your life. Calling 911 is almost always the fastest way to get lifesaving treatment. Emergency Medical Services staff can begin treatment when they arrive - up to an hour sooner than if someone gets to the hospital by car. [facility] MEDICATION AND SIDE EFFECT GUIDE    The [facility] MEDICATION AND SIDE EFFECT GUIDE was provided to the PATIENT AND CARE PROVIDER.   Information provided includes instruction about drug purpose and common side effects for the following medications:    MOTRIN

## 2019-01-16 NOTE — ANESTHESIA POSTPROCEDURE EVALUATION
Procedure(s): HYSTEROSCOPY, DILATION AND CURRETAGE,. Anesthesia Post Evaluation Multimodal analgesia: multimodal analgesia used between 6 hours prior to anesthesia start to PACU discharge Patient location during evaluation: bedside Patient participation: complete - patient participated Level of consciousness: awake Pain management: adequate Airway patency: patent Anesthetic complications: no 
Cardiovascular status: acceptable Respiratory status: acceptable Hydration status: acceptable Post anesthesia nausea and vomiting:  none Visit Vitals /72 Pulse 98 Temp 36.4 °C (97.5 °F) Resp 20 Ht 5' 6\" (1.676 m) Wt 99.8 kg (220 lb) SpO2 100% BMI 35.51 kg/m²

## 2019-01-16 NOTE — H&P
Gynecology History and Physical 
 
Name: Floyd Buck MRN: [id number] SSN: [id number] YOB: 1950  Age: 80 y.o. Sex: female Subjective: Chief complaint:  Postmenopausal bleeding Kerri Lozada is a 80 y.o.  female with a history of postmenopausal bleeding. Previous workup included an ultrasound in the office showing a thickened endometrial stripe and a possible anterior endometrial polyp. Endometrial biospy in the office was unsuccessful. She is admitted for Procedure(s) (LRB): 
HYSTEROSCOPY, DILATION AND CURRETAGE, MYOSURE POLYPECTOMY (N/A) The current method of family planning is post menopausal status. She has a h/o IDDM2, CHTN controlled on meds and ANMOL (sleeps with CPAP). Also has back surgery scheduled next week Past Medical History:  
Diagnosis Date  Allergic rhinitis  DDD (degenerative disc disease) thoracic spine  DJD (degenerative joint disease) of cervical spine  Dyslipidemia, goal LDL below 100 9/29/2017  Eczema  Hypertension  Ill-defined condition   
 bulging disc/pinched nerve low back Having surgery CJW 1/22/19  Ill-defined condition   
 hiatal hernia  Ill-defined condition   
 glaucoma  Ill-defined condition   
 hx diverticulosis  Lichen sclerosus   
 dx at [facility]  ANMOL (obstructive sleep apnea) CPAP - does not use regularly  Other and unspecified hyperlipidemia  Reflux esophagitis  Situational anxiety 9/29/2017  Type II or unspecified type diabetes mellitus without mention of complication, not stated as uncontrolled Past Surgical History:  
Procedure Laterality Date  COLONOSCOPY N/A 2/27/2018 COLONOSCOPY performed by Rica Alfaro MD at [address] HX BACK SURGERY    
 x 3 - total of 3 back surgeries as of 2/26/2018  HX COLONOSCOPY  2013  HX GYN    
 benign tumors removed from uterus  HX HEENT    
 sinus surgery for snoring & partial Uvelectomy  HX HEENT    
 tonsils  HX HIP REPLACEMENT  2004  
 rt  HX ORTHOPAEDIC Bilateral July 2013  
 hip replacement  HX TONSIL AND ADENOIDECTOMY pt does not think her adenoids were removed  HX TUBAL LIGATION    
 PALATOPHAYNGOPLASTY  2002  [place] DCMPRN PERQ NUCLEUS PULPOSUS 1/> LEVELS LUMBAR  2003 L3-4 OB History No data available Allergies Allergen Reactions  Aspirin Nausea and Vomiting Can take a coated aspirin.  Ciprocinonide Swelling Pt. States not allergic to this med.  Crestor [Rosuvastatin] Myalgia Pt. Denies allergy .  Sulfa (Sulfonamide Antibiotics) Hives Pt. Denies allergy Prior to Admission medications Medication Sig Start Date End Date Taking? Authorizing Provider  
losartan-hydroCHLOROthiazide (HYZAAR) 100-25 mg per tablet Take 1 Tab by mouth daily (with lunch). Yes Provider, Historical  
chlorpheniramine-HYDROcodone 5-4 mg/5 mL soln Take 2.5 mL by mouth nightly as needed for Cough. Yes Provider, Historical  
naproxen sodium/pseudoephedrin (ALEVE COLD AND SINUS PO) Take 1 Tab by mouth daily as needed. Yes Provider, Historical  
guaiFENesin (MUCINEX) 1,200 mg Ta12 ER tablet Take 1,200 mg by mouth two (2) times daily as needed for Congestion. Yes Provider, Historical  
latanoprost (XALATAN) 0.005 % ophthalmic solution Administer 1 Drop to both eyes nightly. 5/20/18  Yes Provider, Historical  
furosemide (LASIX) 40 mg tablet Take 1 Tab by mouth nightly. 6/8/18  Yes Song Rascon NP  
ENTERIC COATED ASPIRIN PO Take 81 mg by mouth nightly. Yes Provider, Historical  
CALCIUM CARBONATE (TUMS PO) Take 2 Tabs by mouth two (2) times a day. Yes Provider, Historical  
atorvastatin (LIPITOR) 40 mg tablet Take 40 mg by mouth nightly. Yes Provider, Historical  
glimepiride (AMARYL) 4 mg tablet Take 4 mg by mouth nightly.    Yes Provider, Historical  
CHOLECALCIFEROL, VITAMIN D3, (VITAMIN D3 PO) Take 2,000 Units by mouth daily. Yes Provider, Historical  
insulin aspart U-100 (NOVOLOG FLEXPEN U-100 INSULIN) 100 unit/mL inpn 16-20 Units by SubCUTAneous route daily (with dinner). 16 units For  - 169 
18 units for  -199 
20 units for BS > 200 If BS < 120 takes what she thinks she needs between 8 - 10 units   Yes Provider, Historical  
insulin glargine (TOUJEO SOLOSTAR U-300 INSULIN) 300 unit/mL (1.5 mL) inpn 60 Units by SubCUTAneous route nightly. Yes Provider, Historical  
polyethylene glycol (MIRALAX) 17 gram/dose powder Take 17 g by mouth nightly as needed. Yes Provider, Historical  
BD INSULIN PEN NEEDLE UF MINI 31 gauge x 3/16\" ndle use four times a day for INSULIN INJECTIONS as directed 11/21/16  Yes Provider, Historical  
insulin aspart (NOVOLOG FLEXPEN) 100 unit/mL inpn 14-18 Units by SubCUTAneous route daily (with lunch). 14 units for BS = 120 - 149 
18 units BS for 150 or greater If blood sugar less than 120  Takes amount she thinks she needs 8 units 2/13/17  Yes Garry, [address] [place] [address],[facility], NP  
ONETOUCH ULTRA TEST strip  10/21/15  Yes Provider, Historical  
halobetasol (ULTRAVATE) 0.05 % ointment Apply  to affected area as needed. 8/14/15  Yes Provider, Historical  
metFORMIN (GLUCOPHAGE) 500 mg tablet Take 1,000 mg by mouth two (2) times daily (with meals). Yes Provider, Historical  
  
Family History Problem Relation Age of Onset  Cancer Maternal Grandfather   
     lung  Cancer Paternal Grandfather   
     colon  Heart Attack Mother  Dementia Father   
     alzheimers  Heart Disease Father  Kidney Disease Father  Dementia Maternal Grandmother   
     alzheimers  Breast Cancer Paternal Aunt  Other Sister   
     hysterectomy  Other Brother   
     shingles  Stroke Sister   
     mini  Other Nephew   
     balance disorder Social History Socioeconomic History  Marital status: SINGLE Spouse name: Not on file  Number of children: Not on file  Years of education: Not on file  Highest education level: Not on file Social Needs  Financial resource strain: Not on file  Food insecurity - worry: Not on file  Food insecurity - inability: Not on file  Transportation needs - medical: Not on file  Transportation needs - non-medical: Not on file Occupational History  Not on file Tobacco Use  Smoking status: Never Smoker  Smokeless tobacco: Never Used Substance and Sexual Activity  Alcohol use: Yes Comment: occasional  
 Drug use: No  
 Sexual activity: Not on file Other Topics Concern   Service No  
 Blood Transfusions No  
 Caffeine Concern No  
 Occupational Exposure No  
 Hobby Hazards No  
 Sleep Concern Yes  Stress Concern Yes  Weight Concern Yes  Special Diet No  
 Back Care Yes Comment: 3 back surgeries  Exercise No  
 Bike Helmet Not Asked Comment: n/a [address] Yes  Self-Exams Yes Social History Narrative  Not on file Review of Systems Negative other than those noted in HPI Objective:  
 
Vitals:  
 01/09/19 1013 01/16/19 1024 BP:  157/90 Pulse:  90 Resp:  18 Temp:  98.4 °F (36.9 °C) SpO2:  98% Weight: 99.8 kg (220 lb) Height: 5' 6\" (1.676 m) Physical Exam  
 
Gen: NAD 
CV: RRR Resp: CTAB Abd: obese, soft, nontender, nondistended, + bowel sounds Pelvic: deferred Assessment:  
 
Postmenopausal bleeding and possible endometrial polyp vs hyperplasia vs neoplasm here for surgical endometrial sampling. Plan: 1. Procedure(s) (LRB): 
HYSTEROSCOPY, DILATION AND CURRETAGE, MYOSURE POLYPECTOMY (N/A) Discussed the risks of surgery including the risks of bleeding, infection, deep vein thrombosis, and surgical injuries to internal organs including but not limited to the bowels, bladder, rectum, and female reproductive organs.  The patient understands the risks; any and all questions were answered to the patient's satisfaction. Signed By:  Martin Wright MD   
 January 16, 2019

## 2019-02-19 ENCOUNTER — HOSPITAL ENCOUNTER (OUTPATIENT)
Dept: LAB | Age: 69
Discharge: HOME | End: 2019-02-19
Payer: MEDICARE

## 2019-02-19 ENCOUNTER — OFFICE VISIT (OUTPATIENT)
Dept: FAMILY MEDICINE CLINIC | Age: 69
End: 2019-02-19

## 2019-02-19 VITALS
OXYGEN SATURATION: 97 % | HEART RATE: 85 BPM | SYSTOLIC BLOOD PRESSURE: 146 MMHG | HEIGHT: 66 IN | RESPIRATION RATE: 18 BRPM | DIASTOLIC BLOOD PRESSURE: 74 MMHG | BODY MASS INDEX: 34.55 KG/M2 | TEMPERATURE: 98.5 F | WEIGHT: 215 LBS

## 2019-02-19 DIAGNOSIS — N30.90 BLADDER INFECTION: Primary | ICD-10-CM

## 2019-02-19 DIAGNOSIS — K21.9 GASTROESOPHAGEAL REFLUX DISEASE WITHOUT ESOPHAGITIS: ICD-10-CM

## 2019-02-19 DIAGNOSIS — I10 HTN, GOAL BELOW 140/90: ICD-10-CM

## 2019-02-19 DIAGNOSIS — R39.15 URINARY URGENCY: ICD-10-CM

## 2019-02-19 LAB
BILIRUB UR QL STRIP: NEGATIVE
GLUCOSE UR-MCNC: NEGATIVE MG/DL
KETONES P FAST UR STRIP-MCNC: NEGATIVE MG/DL
PH UR STRIP: 5 [PH] (ref 4.6–8)
PROT UR QL STRIP: NORMAL
SP GR UR STRIP: 1.02 (ref 1–1.03)
UA UROBILINOGEN AMB POC: NORMAL (ref 0.2–1)
URINALYSIS CLARITY POC: CLEAR
URINALYSIS COLOR POC: YELLOW
URINE BLOOD POC: NEGATIVE
URINE LEUKOCYTES POC: NORMAL
URINE NITRITES POC: NEGATIVE

## 2019-02-19 PROCEDURE — 87086 URINE CULTURE/COLONY COUNT: CPT

## 2019-02-19 RX ORDER — OMEPRAZOLE 40 MG/1
40 CAPSULE, DELAYED RELEASE ORAL DAILY
Qty: 30 CAP | Refills: 3 | Status: SHIPPED | OUTPATIENT
Start: 2019-02-19 | End: 2019-12-18

## 2019-02-19 RX ORDER — CIPROFLOXACIN 250 MG/1
250 TABLET, FILM COATED ORAL EVERY 12 HOURS
Qty: 14 TAB | Refills: 0 | Status: SHIPPED | OUTPATIENT
Start: 2019-02-19 | End: 2019-02-22

## 2019-02-19 RX ORDER — OLMESARTAN MEDOXOMIL AND HYDROCHLOROTHIAZIDE 20/12.5 20; 12.5 MG/1; MG/1
1 TABLET ORAL DAILY
Qty: 30 TAB | Refills: 3 | Status: SHIPPED | OUTPATIENT
Start: 2019-02-19 | End: 2019-02-22

## 2019-02-19 NOTE — PROGRESS NOTES
HISTORY OF PRESENT ILLNESS  Tsering Maldonado is a 80 y.o. female. HPI: Patient is complaining of urinary urinary urgency, frequency and dysuria x 3 weeks. Denies abnormal vaginal discharge  She is also complaining of GERD and has been taking Tums without help, requesting another medication .  She reports that she has been having bad acid reflux   She takes Losartan 100/25 for blood pressure, will change it to benicar  Past Medical History:   Diagnosis Date    Allergic rhinitis     DDD (degenerative disc disease)     thoracic spine    DJD (degenerative joint disease) of cervical spine     Dyslipidemia, goal LDL below 100 9/29/2017    Eczema     Hypertension     Ill-defined condition     bulging disc/pinched nerve low back Having surgery CJW 1/22/19    Ill-defined condition     hiatal hernia    Ill-defined condition     glaucoma    Ill-defined condition     hx diverticulosis    Lichen sclerosus     dx at [facility]    ANMOL (obstructive sleep apnea)     CPAP - does not use regularly    Other and unspecified hyperlipidemia     Reflux esophagitis     Situational anxiety 9/29/2017    Type II or unspecified type diabetes mellitus without mention of complication, not stated as uncontrolled      Past Surgical History:   Procedure Laterality Date    COLONOSCOPY N/A 2/27/2018    [facility] ENDOSCOPY    HX BACK SURGERY      x 3 - total of 3 back surgeries as of 2/26/2018    HX COLONOSCOPY  2013    HX GYN      benign tumors removed from uterus    HX HEENT      sinus surgery for snoring & partial Uvelectomy    HX HEENT      tonsils    HX HIP REPLACEMENT  2004    rt    HX ORTHOPAEDIC Bilateral July 2013    hip replacement    HX TONSIL AND ADENOIDECTOMY      pt does not think her adenoids were removed    HX TUBAL LIGATION      PALATOPHAYNGOPLASTY  2002    [place] DCMPRN PERQ NUCLEUS PULPOSUS 1/> LEVELS LUMBAR  2003    L3-4     Allergies   Allergen Reactions    Aspirin Nausea and Vomiting     Can take a coated aspirin.  Ciprocinonide Swelling     Pt. States not allergic to this med.  Crestor [Rosuvastatin] Myalgia     Pt. Denies allergy .  Sulfa (Sulfonamide Antibiotics) Hives     Pt. Denies allergy       Current Outpatient Medications:     ciprofloxacin HCl (CIPRO) 250 mg tablet, Take 1 Tab by mouth every twelve (12) hours for 7 days. , Disp: 14 Tab, Rfl: 0    omeprazole (PRILOSEC) 40 mg capsule, Take 1 Cap by mouth daily. , Disp: 30 Cap, Rfl: 3    olmesartan-hydroCHLOROthiazide (BENICAR HCT) 20-12.5 mg per tablet, Take 1 Tab by mouth daily. , Disp: 30 Tab, Rfl: 3    ibuprofen (MOTRIN) 800 mg tablet, Take 1 Tab by mouth every eight (8) hours as needed for Pain., Disp: 20 Tab, Rfl: 1    chlorpheniramine-HYDROcodone 5-4 mg/5 mL soln, Take 2.5 mL by mouth nightly as needed for Cough. , Disp: , Rfl:     naproxen sodium/pseudoephedrin (ALEVE COLD AND SINUS PO), Take 1 Tab by mouth daily as needed. , Disp: , Rfl:     latanoprost (XALATAN) 0.005 % ophthalmic solution, Administer 1 Drop to both eyes nightly., Disp: , Rfl: 0    furosemide (LASIX) 40 mg tablet, Take 1 Tab by mouth nightly., Disp: 30 Tab, Rfl: 0    ENTERIC COATED ASPIRIN PO, Take 81 mg by mouth nightly., Disp: , Rfl:     atorvastatin (LIPITOR) 40 mg tablet, Take 40 mg by mouth nightly., Disp: , Rfl:     glimepiride (AMARYL) 4 mg tablet, Take 4 mg by mouth nightly., Disp: , Rfl:     CHOLECALCIFEROL, VITAMIN D3, (VITAMIN D3 PO), Take 2,000 Units by mouth daily. , Disp: , Rfl:     insulin aspart U-100 (NOVOLOG FLEXPEN U-100 INSULIN) 100 unit/mL inpn, 16-20 Units by SubCUTAneous route daily (with dinner).  16 units For  - 169 18 units for  -199 20 units for BS > 200  If BS < 120 takes what she thinks she needs between 8 - 10 units, Disp: , Rfl:     insulin glargine (TOUJEO SOLOSTAR U-300 INSULIN) 300 unit/mL (1.5 mL) inpn, 60 Units by SubCUTAneous route nightly., Disp: , Rfl:    polyethylene glycol (MIRALAX) 17 gram/dose powder, Take 17 g by mouth nightly as needed. , Disp: , Rfl:     BD INSULIN PEN NEEDLE UF MINI 31 gauge x 3/16\" ndle, use four times a day for INSULIN INJECTIONS as directed, Disp: , Rfl: 1    insulin aspart (NOVOLOG FLEXPEN) 100 unit/mL inpn, 14-18 Units by SubCUTAneous route daily (with lunch). 14 units for BS = 120 - 149 18 units BS for 150 or greater  If blood sugar less than 120  Takes amount she thinks she needs 8 units, Disp: , Rfl:     ONETOUCH ULTRA TEST strip, , Disp: , Rfl: 0    halobetasol (ULTRAVATE) 0.05 % ointment, Apply  to affected area as needed. , Disp: , Rfl: 0    metFORMIN (GLUCOPHAGE) 500 mg tablet, Take 1,000 mg by mouth two (2) times daily (with meals). , Disp: , Rfl:   Review of Systems   Constitutional: Negative. Respiratory: Negative. Cardiovascular: Negative. Gastrointestinal: Positive for heartburn. Genitourinary: Positive for dysuria, frequency and urgency. Blood pressure 146/74, pulse 85, temperature 98.5 °F (36.9 °C), temperature source Oral, resp. rate 18, height 5' 6\" (1.676 m), weight 215 lb (97.5 kg), last menstrual period 09/23/2009, SpO2 97 %. Physical Exam   Constitutional: No distress. HENT:   Mouth/Throat: Oropharynx is clear and moist.   Neck: Normal range of motion. Neck supple. Cardiovascular: Normal rate and regular rhythm. No murmur heard. Pulmonary/Chest: Effort normal and breath sounds normal.   Abdominal: Soft. Bowel sounds are normal.   Genitourinary:   Genitourinary Comments: UA is positive for blood and infection   Nursing note and vitals reviewed. ASSESSMENT and PLAN  Diagnoses and all orders for this visit:    1. Bladder infection  -     ciprofloxacin HCl (CIPRO) 250 mg tablet; Take 1 Tab by mouth every twelve (12) hours for 7 days. -     CULTURE, URINE    2. HTN, goal below 140/90  -     olmesartan-hydroCHLOROthiazide (BENICAR HCT) 20-12.5 mg per tablet; Take 1 Tab by mouth daily.     3. Gastroesophageal reflux disease without esophagitis  -     omeprazole (PRILOSEC) 40 mg capsule; Take 1 Cap by mouth daily.     4. Urinary urgency  -     AMB POC URINALYSIS DIP STICK MANUAL W/O MICRO  Pt was given an after visit summary which includes diagnosis, current medicines and vital and voiced understanding of treatment plan

## 2019-02-19 NOTE — PROGRESS NOTES
Chief Complaint   Patient presents with    Urgency     For about 3 weeks.  Medication Evaluation     Endocinologist Prescribed this for about 4 months. 1. Have you been to the ER, urgent care clinic since your last visit? Hospitalized since your last visit? No    2. Have you seen or consulted any other health care providers outside of the [address] [place] [address] since your last visit? Include any pap smears or colon screening.  No

## 2019-02-21 LAB — BACTERIA UR CULT: NORMAL

## 2019-02-22 ENCOUNTER — TELEPHONE (OUTPATIENT)
Dept: FAMILY MEDICINE CLINIC | Age: 69
End: 2019-02-22

## 2019-02-22 RX ORDER — AMLODIPINE BESYLATE 5 MG/1
5 TABLET ORAL DAILY
Qty: 30 TAB | Refills: 0 | Status: SHIPPED | OUTPATIENT
Start: 2019-02-22 | End: 2019-05-02 | Stop reason: SDUPTHER

## 2019-02-22 NOTE — TELEPHONE ENCOUNTER
TEOFLIO Yee LPN   Caller: Unspecified (Today,  1:06 PM)             Medication changed to Norvasc      Patient informed of change

## 2019-02-22 NOTE — TELEPHONE ENCOUNTER
----- Message from Leigh Ann Mejia sent at 2/22/2019 12:52 PM EST -----  Regarding: /Telephone  Pt requesting a call back regarding allergic reaction from one of the Rxs (Rx\"Olmesartan 20/12.5 mg\" , Rx\"Ometrazole 40 mg\" , and Rx \"Ciprofloxacin hcl 250 mg\") due to swollen upper lip. Best contact:[phone]

## 2019-02-22 NOTE — TELEPHONE ENCOUNTER
Patient states lip started swollen last night. yest was first day she took cipro. Also started benicar on 02/20/19    She is using benadryl with little relief.  Top lip still swollen  Has not taken any meds today

## 2019-05-03 RX ORDER — AMLODIPINE BESYLATE 5 MG/1
TABLET ORAL
Qty: 30 TAB | Refills: 0 | Status: SHIPPED | OUTPATIENT
Start: 2019-05-03 | End: 2019-09-13 | Stop reason: SDUPTHER

## 2019-05-27 DIAGNOSIS — E78.01 FAMILIAL HYPERCHOLESTEROLEMIA: ICD-10-CM

## 2019-05-28 RX ORDER — ATORVASTATIN CALCIUM 40 MG/1
TABLET, FILM COATED ORAL
Qty: 30 TAB | Refills: 1 | Status: SHIPPED | OUTPATIENT
Start: 2019-05-28 | End: 2020-03-28

## 2019-09-13 RX ORDER — AMLODIPINE BESYLATE 5 MG/1
TABLET ORAL
Qty: 90 TAB | Refills: 1 | Status: SHIPPED | OUTPATIENT
Start: 2019-09-13 | End: 2021-01-19

## 2019-12-18 ENCOUNTER — ANESTHESIA EVENT (OUTPATIENT)
Dept: ENDOSCOPY | Age: 69
End: 2019-12-18
Payer: MEDICARE

## 2019-12-18 ENCOUNTER — HOSPITAL ENCOUNTER (OUTPATIENT)
Age: 69
Setting detail: OUTPATIENT SURGERY
Discharge: HOME | End: 2019-12-18
Attending: INTERNAL MEDICINE | Admitting: INTERNAL MEDICINE
Payer: MEDICARE

## 2019-12-18 ENCOUNTER — ANESTHESIA (OUTPATIENT)
Dept: ENDOSCOPY | Age: 69
End: 2019-12-18
Payer: MEDICARE

## 2019-12-18 VITALS
RESPIRATION RATE: 17 BRPM | TEMPERATURE: 98.3 F | HEART RATE: 97 BPM | WEIGHT: 215 LBS | OXYGEN SATURATION: 98 % | BODY MASS INDEX: 34.55 KG/M2 | HEIGHT: 66 IN | DIASTOLIC BLOOD PRESSURE: 96 MMHG | SYSTOLIC BLOOD PRESSURE: 142 MMHG

## 2019-12-18 LAB
GLUCOSE BLD STRIP.AUTO-MCNC: 154 MG/DL (ref 65–100)
SERVICE CMNT-IMP: ABNORMAL

## 2019-12-18 PROCEDURE — 76040000019: Performed by: INTERNAL MEDICINE

## 2019-12-18 PROCEDURE — 74011250636 HC RX REV CODE- 250/636: Performed by: NURSE ANESTHETIST, CERTIFIED REGISTERED

## 2019-12-18 PROCEDURE — 82962 GLUCOSE BLOOD TEST: CPT

## 2019-12-18 PROCEDURE — 76060000031 HC ANESTHESIA FIRST 0.5 HR: Performed by: INTERNAL MEDICINE

## 2019-12-18 PROCEDURE — 88305 TISSUE EXAM BY PATHOLOGIST: CPT

## 2019-12-18 PROCEDURE — 77030021593 HC FCPS BIOP ENDOSC BSC -A: Performed by: INTERNAL MEDICINE

## 2019-12-18 PROCEDURE — 74011000250 HC RX REV CODE- 250: Performed by: NURSE ANESTHETIST, CERTIFIED REGISTERED

## 2019-12-18 RX ORDER — LIDOCAINE HYDROCHLORIDE 20 MG/ML
INJECTION, SOLUTION EPIDURAL; INFILTRATION; INTRACAUDAL; PERINEURAL AS NEEDED
Status: DISCONTINUED | OUTPATIENT
Start: 2019-12-18 | End: 2019-12-18 | Stop reason: HOSPADM

## 2019-12-18 RX ORDER — SODIUM CHLORIDE 9 MG/ML
INJECTION, SOLUTION INTRAVENOUS
Status: DISCONTINUED | OUTPATIENT
Start: 2019-12-18 | End: 2019-12-18 | Stop reason: HOSPADM

## 2019-12-18 RX ORDER — PROPOFOL 10 MG/ML
INJECTION, EMULSION INTRAVENOUS AS NEEDED
Status: DISCONTINUED | OUTPATIENT
Start: 2019-12-18 | End: 2019-12-18 | Stop reason: HOSPADM

## 2019-12-18 RX ADMIN — PROPOFOL 150 MG: 10 INJECTION, EMULSION INTRAVENOUS at 14:46

## 2019-12-18 RX ADMIN — LIDOCAINE HYDROCHLORIDE 100 MG: 20 INJECTION, SOLUTION EPIDURAL; INFILTRATION; INTRACAUDAL; PERINEURAL at 14:46

## 2019-12-18 RX ADMIN — SODIUM CHLORIDE: 900 INJECTION, SOLUTION INTRAVENOUS at 14:30

## 2019-12-18 NOTE — PERIOP NOTES

## 2019-12-18 NOTE — DISCHARGE INSTRUCTIONS
Jitendra Shaikh 81 Education   Patient Education   Patient Education        Esophagitis: Care Instructions  Your Care Instructions    Esophagitis (say \"ih-sof-uh-JY-tus\") is irritation of the esophagus, the tube that carries food from your throat to your stomach. Acid reflux is the most common cause of this condition. When you have reflux, stomach acid and juices flow upward. This can cause pain or a burning feeling in your chest. You may have a sore throat. It may be hard to swallow. Other causes of this condition include some medicines and supplements. Allergies or an infection can also cause it. Your doctor will ask about your symptoms and past health. He or she might do tests to find the cause of your symptoms. Treatment depends on what is causing the problem. Treatment might include changing your diet or taking medicine to relieve your symptoms. It might also include changing a medicine that is causing your symptoms. If you have reflux, medicine that reduces the stomach acid helps your body heal. It might take 1 to 3 weeks to heal.  Follow-up care is a key part of your treatment and safety. Be sure to make and go to all appointments, and call your doctor if you are having problems. It's also a good idea to know your test results and keep a list of the medicines you take. How can you care for yourself at home? · If you have acid reflux, your doctor may recommend that you:  ? Eat several small meals instead of two or three large meals. After you eat, wait 2 to 3 hours before you lie down. ? Avoid chocolate, mint, alcohol, and spicy foods. ? Don't smoke or use smokeless tobacco. Smoking can make this condition worse. If you need help quitting, talk to your doctor about stop-smoking programs and medicines. These can increase your chances of quitting for good. ? Raise the head of your bed 6 to 8 inches if you have symptoms at night. ?  Lose weight if you are overweight. ? Take an over-the-counter antacid, such as Maalox, Mylanta, or Tums. Be careful when you take over-the-counter antacid medicines. Many of these medicines have aspirin in them. Read the label to make sure that you are not taking more than the recommended dose. Too much aspirin can be harmful. ? Take stronger acid reducers. Examples are famotidine (such as Pepcid), omeprazole (such as Prilosec), and ranitidine (such as Zantac). · If your condition is caused by infection, allergy, or other problems, use the medicine or treatments that your doctor recommends. · Be safe with medicines. Take your medicines exactly as prescribed. Call your doctor if you think you are having a problem with your medicine. When should you call for help? Call your doctor now or seek immediate medical care if:    · You have new or worse belly pain.     · You are vomiting.    Watch closely for changes in your health, and be sure to contact your doctor if:    · You have new or worse symptoms of reflux.     · You have trouble or pain swallowing.     · You are losing weight.     · You do not get better as expected. Where can you learn more? Go to http://vignesh-nini.info/. Enter F990 in the search box to learn more about \"Esophagitis: Care Instructions. \"  Current as of: November 7, 2018  Content Version: 12.2  © 6960-5475 [employer]. Care instructions adapted under license by [employer] (which disclaims liability or warranty for this information). If you have questions about a medical condition or this instruction, always ask your healthcare professional. [place] [address] any warranty or liability for your use of this information. Peptic Ulcer Disease: Care Instructions  Your Care Instructions    Peptic ulcers are sores on the inside of the stomach or the small intestine (such as a duodenal ulcer).  They are most often caused by an infection with bacteria or from use of nonsteroidal anti-inflammatory drugs (NSAIDs). NSAIDs include aspirin, ibuprofen (Advil), and naproxen (Aleve). Your doctor may have prescribed medicine to reduce stomach acid. You also may need to take antibiotics if your peptic ulcers are caused by an infection. You can help yourself heal and keep ulcers from coming back by making some changes in your lifestyle. Quit smoking. Limit alcohol. Follow-up care is a key part of your treatment and safety. Be sure to make and go to all appointments, and call your doctor if you are having problems. It's also a good idea to know your test results and keep a list of the medicines you take. How can you care for yourself at home? · Be safe with medicines. Take your medicines exactly as prescribed. Call your doctor if you think you are having a problem with your medicine. · Do not take aspirin or other NSAIDs such as ibuprofen (Advil or Motrin) or naproxen (Aleve). Ask your doctor what you can take for pain. · Do not smoke. Smoking can make ulcers worse. If you need help quitting, talk to your doctor about stop-smoking programs and medicines. These can increase your chances of quitting for good. · Drink in moderation or avoid drinking alcohol. · Eat a balanced diet of small, frequent meals. See a dietitian if you need help planning your meals. When should you call for help? Call 911 anytime you think you may need emergency care. For example, call if:    · You vomit blood or what looks like coffee grounds.     · You pass maroon or very bloody stools.    Call your doctor now or seek immediate medical care if:    · You have new or worse belly pain.     · Your stools are black and look like tar, or they have streaks of blood.     · You vomit.    Watch closely for changes in your health, and be sure to contact your doctor if:    · You do not get better as expected. Where can you learn more?   Go to http://vignesh-nini.info/. Enter O427 in the search box to learn more about \"Peptic Ulcer Disease: Care Instructions. \"  Current as of: November 7, 2018  Content Version: 12.2  © 9532-1132 [employer]. Care instructions adapted under license by [employer] (which disclaims liability or warranty for this information). If you have questions about a medical condition or this instruction, always ask your healthcare professional. [place] [address] any warranty or liability for your use of this information. Hiatal Hernia: Care Instructions  Your Care Instructions  A hiatal hernia occurs when part of the stomach bulges into the chest cavity. A hiatal hernia may allow stomach acid and juices to back up into the esophagus (acid reflux). This can cause a feeling of burning, warmth, heat, or pain behind the breastbone. This feeling may often occur after you eat, soon after you lie down, or when you bend forward, and it may come and go. You also may have a sour taste in your mouth. These symptoms are commonly known as heartburn or reflux. But not all hiatal hernias cause symptoms. Follow-up care is a key part of your treatment and safety. Be sure to make and go to all appointments, and call your doctor if you are having problems. It's also a good idea to know your test results and keep a list of the medicines you take. How can you care for yourself at home? · Take your medicines exactly as prescribed. Call your doctor if you think you are having a problem with your medicine. · Do not take aspirin or other nonsteroidal anti-inflammatory drugs (NSAIDs), such as ibuprofen (Advil, Motrin) or naproxen (Aleve), unless your doctor says it is okay. Ask your doctor what you can take for pain. · Your doctor may recommend over-the-counter medicine. For mild or occasional indigestion, antacids such as Tums, Gaviscon, Maalox, or Mylanta may help.  Your doctor also may recommend over-the-counter acid reducers, such as famotidine (Pepcid AC), cimetidine (Tagamet HB), ranitidine (Zantac 75 and Zantac 150), or omeprazole (Prilosec). Read and follow all instructions on the label. If you use these medicines often, talk with your doctor. · Change your eating habits. ? It's best to eat several small meals instead of two or three large meals. ? After you eat, wait 2 to 3 hours before you lie down. Late-night snacks aren't a good idea. ? Chocolate, mint, and alcohol can make heartburn worse. They relax the valve between the esophagus and the stomach. ? Spicy foods, foods that have a lot of acid (like tomatoes and oranges), and coffee can make heartburn symptoms worse in some people. If your symptoms are worse after you eat a certain food, you may want to stop eating that food to see if your symptoms get better. · Do not smoke or chew tobacco.  · If you get heartburn at night, raise the head of your bed 6 to 8 inches by putting the frame on blocks or placing a foam wedge under the head of your mattress. (Adding extra pillows does not work.)  · Do not wear tight clothing around your middle. · Lose weight if you need to. Losing just 5 to 10 pounds can help. When should you call for help? Call your doctor now or seek immediate medical care if:    · You have new or worse belly pain.     · You are vomiting.    Watch closely for changes in your health, and be sure to contact your doctor if:    · You have new or worse symptoms of indigestion.     · You have trouble or pain swallowing.     · You are losing weight.     · You do not get better as expected. Where can you learn more? Go to http://vignesh-nini.info/. Enter G260 in the search box to learn more about \"Hiatal Hernia: Care Instructions. \"  Current as of: November 7, 2018  Content Version: 12.2  © 6173-7697 [employer].  Care instructions adapted under license by [employer] (which disclaims liability or warranty for this information). If you have questions about a medical condition or this instruction, always ask your healthcare professional. [place] [address] any warranty or liability for your use of this information. [address] [facility], [address] [place] [address]    EGD DISCHARGE INSTRUCTIONS    Caleb Smith  [id number]  1950    Discomfort:  Sore throat-  warm salt water gargle  redness at IV site- apply warm compress to area; if redness or soreness persist- contact your physician  Gaseous discomfort- walking, belching will help relieve any discomfort  You may not operate a vehicle for 12 hours  You may not engage in an occupation involving machinery or appliances for rest of today  You may not drink alcoholic beverages for at least 12 hours  Avoid making any critical decisions for at least 24 hour  DIET  You may resume your regular diet - however -  remember your colon is empty and a heavy meal will produce gas. Avoid these foods:  vegetables, fried / greasy foods, carbonated drinks    ACTIVITY  You may resume your normal daily activities   Spend the remainder of the day resting -  avoid any strenuous activity. CALL M.D. ANY SIGN OF   Increasing pain, nausea, vomiting  Abdominal distension (swelling)  New increased bleeding (oral or rectal)  Fever (chills)  Pain in chest area    Shortness of breath    Follow-up Instructions:   Call Dr. Shirley Thacker for any questions or problems and follow up with him in 6 weeks  Telephone # [phone]  To start the already prescribed nexium and pepcid, please pick them up from your pharmacy  Avoid NSAIDS    ENDOSCOPY FINDINGS:   Your endoscopy showed inflammation with small non bleeding ulcers in your stomach. You also have small hiatal hernia and esophagitis ( inflammation in your esophagus from acid reflux), biopsies taken.     Signed By: Shirley Thacker MD     12/18/2019  3:02 PM

## 2019-12-18 NOTE — H&P
The patient is a 77year old female who presents with a complaint of Acid Reflux. The patient presents due to new symptoms. The onset of the acid reflux has been gradual and has been occurring in an increasing pattern for 8 months. The course has been worsening. The acid reflux is described as a moderate to severe burning, indigestion, waterbrash and frequent throat clearing. The disease is described as being located in the retrosternal area. Aggravating factors include tomato-based products and  the acid reflux is relieved by antacids. The symptoms have been associated with nausea, pulmonary symptoms, voice changes and waterbrash,  while the symptoms have not been associated with abdominal distention, abdominal pain, dysphagia or melena. The acid reflux is characterized as not well controlled. The patient's current medication use includes: antacids, H2 antagonist and omeprazole (Prilosec) and is  not considered effective by patient .  Failed treatments have included: PPI is once a day. Note for \"Acid Reflux\": She has worsening reflux since February, no improvement on Omeprazole. This year she had D&C, back surgery, and BRINA. She has seen an ENT and says there was nothing to do. She has ANMOL and uses CPAP. She reports issues with getting choked and food going down the wrong pipe since tonsillectomy 15-20 years ago. She has most improvement with Tums, no relief with PPI or H2 blockers. Problem List/Past Medical (Roxy Romero; 11/8/2019 2:51 PM)  Hypertension    Hypercholesterolemia    Diabetes Mellitus    Abdominal pain (789.00  R10.9)    Left lower quadrant pain (789.04  R10.32)    Constipation (564.00  K59.00)      Past Surgical History (Roxy Romero; 11/8/2019 2:51 PM)  Hysterectomy; Total    Tonsillectomy    Back Surgery    Tubal Ligation    History of hip surgery (V45.89  Z98.890)   Bilateral.    Allergies (Roxy Romero; 11/8/2019 2:51 PM)  Seasonal    No Known Drug Allergies  [11/08/2019]:    Medication History (Roxy Darling; 11/8/2019 2:51 PM)  Omeprazole  (40MG Capsule DR, Oral) Active. amLODIPine Besylate  (5MG Tablet, Oral) Active. MiraLax  (Oral) Active. Glimepiride  (4MG Tablet, 1 Oral daily) Active. MetFORMIN HCl  (500MG Tablet, 1 Oral bid) Active. Atorvastatin Calcium  (40MG Tablet, 1 Oral daily) Active. Tums  (500MG Tablet Chewable, Oral bid) Active. Vitamin D  (2000UNIT Capsule, 1 Oral daily) Active. NovoLOG  (100UNIT/ML Solution, 12-14 units at lunch Subcutaneous 16-18 units at dinner) Active. Toujeo SoloStar  (300UNIT/ML Soln Pen-inj, Subcutaneous) Active. Aspirin  (81MG Tablet, Oral) Active. Latanoprost  (0.005% Solution, 1 Ophthalmic daily) Active. Medications Reconciled     Family History (Roxy Darling; 11/8/2019 2:51 PM)  Heart Disease   Father, Mother. Kidney problem (593.9  N28.9)   Father. Stroke   Father. Tuberculosis (011.90  A15.9)   Father. Social History (Roxy Darling; 11/8/2019 2:51 PM)  Tobacco Use   Never smoker. Alcohol Use   Occasional alcohol use. Marital status   Single. Employment status   Retired. Blood Transfusion   No.    Diagnostic Studies History (Roxy Darling; 11/8/2019 2:51 PM)  Colonoscopy      Health Maintenance History (Roxy Darling; 11/8/2019 2:51 PM)  Flu Vaccine   none  Pneumovax   Date: 2017. Other Problems (Roxy Darling; 11/8/2019 2:51 PM)  Colon cancer screening (V76.51  Z12.11)          Review of Systems (Roxy Darling; 11/8/2019 2:51 PM)  General Present- Poor Appetite. Not Present- Chronic Fatigue, Weight Gain and Weight Loss. Skin Not Present- Itching, Rash and Skin Color Changes. HEENT Not Present- Hearing Loss and Vertigo. Respiratory Present- Difficulty Breathing. Not Present- TB exposure. Cardiovascular Not Present- Chest Pain, Use of Antibiotics before Dental Procedures and Use of Blood Thinners.   Gastrointestinal Present- See HPI.  Musculoskeletal Present- Arthritis. Not Present- Hip Replacement Surgery and Knee Replacement Surgery. Neurological Not Present- Weakness. Psychiatric Present- Depression. Endocrine Present- Diabetes. Not Present- Thyroid Problems. Hematology Not Present- Anemia. Vitals (Sharonda L. Thomos Bumpers; 11/8/2019 2:56 PM)  11/8/2019 2:49 PM  Weight: 225.5 lb   Height: 66 in   Body Surface Area: 2.1 m²   Body Mass Index: 36.4 kg/m²    Pulse: 76 (Regular)    Resp.: 18 (Unlabored)     BP: 160/90 (Sitting, Left Arm, Standard)              Physical Exam Vincent Giang AGACNP; 11/8/2019 3:11 PM)  General  Mental Status - Alert. General Appearance - Cooperative, Pleasant, Not in acute distress. Orientation - Oriented X3. Build & Nutrition - Well nourished and Well developed. Integumentary  General Characteristics  Color - normal coloration of skin. Temperature - normal warmth is noted. Mobility & Turgor - normal mobility and turgor. Head and Neck  Head - normocephalic, atraumatic with no lesions or palpable masses. Neck  Global Assessment - full range of motion and supple. Trachea - midline. Eye  Eyeball - Bilateral - No Exophthalmos. Sclera/Conjunctiva - Left - No Jaundice. Sclera/Conjunctiva - Right - No Jaundice. Chest and Lung Exam  Chest and lung exam reveals  - quiet, even and easy respiratory effort with no use of accessory muscles. Auscultation  Breath sounds - Normal. Adventitious sounds - No Adventitious sounds. Cardiovascular  Auscultation  Rhythm - Regular, No Tachycardia, No Bradycardia . Heart Sounds - Normal heart sounds , S1 WNL and S2 WNL, No S3, No Summation Gallop. Murmurs & Other Heart Sounds - Auscultation of the heart reveals - No Murmurs. Abdomen  Inspection  Inspection of the abdomen reveals - Soft and Obese. Palpation/Percussion  Tenderness - Non-Tender. Rebound tenderness - No rebound. Rigidity (guarding) - No Rigidity.  Dullness to percussion - No abnormal dullness to percussion. Liver - No hepatosplenomegaly. Abdominal Mass Palpable - No masses. Other Characteristics - No Ascites. Auscultation  Auscultation of the abdomen reveals - Bowel sounds normal, No Abdominal bruits and No Succussion splash. Rectal - Did not examine. Peripheral Vascular  Upper Extremity  Inspection - Left - Normal - No Clubbing, No Cyanosis, No Edema, Pulses Intact. Right - Normal - No Clubbing, No Cyanosis, No Edema, Pulses Intact. Palpation - Edema - Left - No edema. Right - No edema. Neurologic  Neurologic evaluation reveals  - Cranial nerves grossly intact, no focal neurologic deficits. Musculoskeletal  Global Assessment  Gait and Station - normal gait and station. Assessment & Plan (Ezekiel Giang [facility]; 11/8/2019 3:14 PM)  Dysphagia (787.20  R13.10)  Acid reflux (530.81  K21.9)  Story: Colonoscopy 2/2018: mild sigmoid diverticulosis, sigmoid 9 mm tubular adenoma; 5 years  Impression: She reports worsening acid reflux for most of this year, no improvement on PPI or H2 blockers. Will try to switch Prilosec to Nexium and Zantac to Pepcid. Encouraged diet/lifestyle modifications for GERD. Will further evaluate with EGD. Current Plans  Started Famotidine 20MG, 1 (one) Tablet before bedtime, #30, 30 days starting 11/08/2019, Ref. x2.  Started Esomeprazole Magnesium 40MG, 1 (one) Capsule daily 30 minutes before breakfast, #30, 30 days starting 11/08/2019, Ref. x2.  Pt Education - How to access health information online: discussed with patient and provided information. Endoscopy (87663) (Discussed risks and benefits with the patient to include: perforation,or post biopsy bleeding, missed lesions, and sedation reactions.)  Patient is to call me for any questions or concerns. Follow up office visit after procedure  This patient was reviewed and seen in collaboration with Dr. Shannan Manley. Date of Surgery Update:  Luz Marina Hernandez was seen and examined.   History and physical has been reviewed. The patient has been examined.  There have been no significant clinical changes since the completion of the originally dated History and Physical.    Signed By: Santiago Herron MD     December 18, 2019 2:40 PM

## 2019-12-18 NOTE — ROUTINE PROCESS
Roma Oviedo 1950 
[id number] Situation: 
Verbal report received from: Joreg Siddiqui RN Procedure: Procedure(s): ESOPHAGOGASTRODUODENOSCOPY (EGD) ESOPHAGOGASTRODUODENAL (EGD) BIOPSY Background: 
 
Preoperative diagnosis: REFLUX Postoperative diagnosis: Esophagitis Gastritis Hiatal Hernia :  Dr. Melia Landau Assistant(s): Endoscopy Technician-1: Jeremy Thompson Endoscopy RN-1: Foreign Gupta RN Specimens:  
ID Type Source Tests Collected by Time Destination 1 : stomach Preservative Gastric  Adi Sawyer MD 12/18/2019 1451 Pathology 2 : random esophagus Preservative   Adi Sawyer MD 12/18/2019 1451 Pathology H. Pylori  no Assessment: 
Intra-procedure medications Anesthesia gave intra-procedure sedation and medications, see anesthesia flow sheet yes Intravenous fluids: NS@ Thora Haagensen Vital signs stable Abdominal assessment: round and soft Recommendation: 
Discharge patient per MD order. Family or Friend Permission to share finding with family or friend yes

## 2019-12-18 NOTE — PROCEDURES
[address]  [address] [facility], [address] [place] [address]        Esophago- Gastroduodenoscopy (EGD) Procedure Note    Devendra Aguilera  1950  [id number]      Procedure: Endoscopic Gastroduodenoscopy with biopsy    Indication:  Heartburn, persistent despite therapy     Pre-operative Diagnosis: see indication above    Post-operative Diagnosis: see findings below    : Mick Bill MD    Surgical Assistant: None    Implants:  None    Referring Provider:  Shandra Kenny MD      Anesthesia/Sedation:  MAC anesthesia Propofol        Procedure Details     After infomed consent was obtained for the procedure, with all risks and benefits of procedure explained the patient was taken to the endoscopy suite and placed in the left lateral decubitus position. Following sequential administration of sedation as per above, the endoscope was inserted into the mouth and advanced under direct vision to third portion of the duodenum. A careful inspection was made as the gastroscope was withdrawn, including a retroflexed view of the proximal stomach; findings and interventions are described below. Findings:   Esophagus:hiatal hernia 3 cm in size   Grade 3 erosive esophagitis at G-E junction    Rest of esophagus was normal, random biopsies taken  Stomach: erosive gastritis in antrum, biopsies taken  Duodenum/jejunum: normal      Therapies:  none    Specimens: as above         EBL: None      Complications:   None; patient tolerated the procedure well. Impression:    -See post-procedure diagnoses.     Recommendations:  -she is off prilosec and did not start the prescribed nexium and pepcid at her last office visit, instructed to pick it up from her pharmacy and to start it  - f/u in 2 months  -repeat EGD in 3 to 4 months to assess healing of esophagitis  -avoid NSAIDS    Signed By: Mick Bill MD     12/18/2019  2:57 PM

## 2019-12-18 NOTE — ANESTHESIA POSTPROCEDURE EVALUATION
Procedure(s):  ESOPHAGOGASTRODUODENOSCOPY (EGD)  ESOPHAGOGASTRODUODENAL (EGD) BIOPSY. MAC    Anesthesia Post Evaluation      Multimodal analgesia: multimodal analgesia not used between 6 hours prior to anesthesia start to PACU discharge  Patient location during evaluation: PACU  Patient participation: complete - patient participated  Level of consciousness: awake  Pain score: 0  Pain management: adequate  Airway patency: patent  Anesthetic complications: no  Cardiovascular status: acceptable  Respiratory status: acceptable  Hydration status: acceptable  Comments: I have evaluated the patient and meets criteria for discharge from PACU. Siobhan Myles MD        Vitals Value Taken Time   BP     Temp     Pulse 96 12/18/2019  3:09 PM   Resp 20 12/18/2019  3:09 PM   SpO2 99 % 12/18/2019  3:09 PM   Vitals shown include unvalidated device data.

## 2019-12-18 NOTE — ANESTHESIA PREPROCEDURE EVALUATION
Relevant Problems   No relevant active problems       Anesthetic History   No history of anesthetic complications            Review of Systems / Medical History  Patient summary reviewed, nursing notes reviewed and pertinent labs reviewed    Pulmonary  Within defined limits      Sleep apnea           Neuro/Psych   Within defined limits           Cardiovascular  Within defined limits  Hypertension                   GI/Hepatic/Renal  Within defined limits   GERD           Endo/Other  Within defined limits  Diabetes    Morbid obesity and arthritis     Other Findings              Physical Exam    Airway  Mallampati: II  TM Distance: > 6 cm  Neck ROM: normal range of motion   Mouth opening: Normal     Cardiovascular  Regular rate and rhythm,  S1 and S2 normal,  no murmur, click, rub, or gallop             Dental  No notable dental hx       Pulmonary  Breath sounds clear to auscultation               Abdominal  GI exam deferred       Other Findings            Anesthetic Plan    ASA: 2  Anesthesia type: MAC            Anesthetic plan and risks discussed with: Patient

## 2020-03-27 DIAGNOSIS — E78.01 FAMILIAL HYPERCHOLESTEROLEMIA: ICD-10-CM

## 2020-03-28 RX ORDER — ATORVASTATIN CALCIUM 40 MG/1
TABLET, FILM COATED ORAL
Qty: 30 TAB | Refills: 1 | Status: SHIPPED | OUTPATIENT
Start: 2020-03-28 | End: 2020-05-14 | Stop reason: SDUPTHER

## 2020-05-09 DIAGNOSIS — E78.01 FAMILIAL HYPERCHOLESTEROLEMIA: ICD-10-CM

## 2020-05-12 RX ORDER — ATORVASTATIN CALCIUM 40 MG/1
TABLET, FILM COATED ORAL
Qty: 90 TAB | OUTPATIENT
Start: 2020-05-12

## 2020-05-13 NOTE — TELEPHONE ENCOUNTER
[phone] notified patient needs virtual visit for medication refill patient has appointment 5/14/2020 at 11:15AM patient understand

## 2020-05-14 ENCOUNTER — VIRTUAL VISIT (OUTPATIENT)
Dept: FAMILY MEDICINE CLINIC | Age: 70
End: 2020-05-14

## 2020-05-14 DIAGNOSIS — E11.9 CONTROLLED TYPE 2 DIABETES MELLITUS WITHOUT COMPLICATION, WITH LONG-TERM CURRENT USE OF INSULIN (HCC): ICD-10-CM

## 2020-05-14 DIAGNOSIS — K21.9 GASTROESOPHAGEAL REFLUX DISEASE, ESOPHAGITIS PRESENCE NOT SPECIFIED: ICD-10-CM

## 2020-05-14 DIAGNOSIS — E78.01 FAMILIAL HYPERCHOLESTEROLEMIA: Primary | ICD-10-CM

## 2020-05-14 DIAGNOSIS — Z79.4 CONTROLLED TYPE 2 DIABETES MELLITUS WITHOUT COMPLICATION, WITH LONG-TERM CURRENT USE OF INSULIN (HCC): ICD-10-CM

## 2020-05-14 DIAGNOSIS — I10 ESSENTIAL HYPERTENSION: ICD-10-CM

## 2020-05-14 RX ORDER — CETIRIZINE HYDROCHLORIDE 10 MG/1
CAPSULE, LIQUID FILLED ORAL
COMMUNITY
End: 2020-06-17

## 2020-05-14 RX ORDER — AA/PROT/LYSINE/METHIO/VIT C/B6 50-12.5 MG
TABLET ORAL
COMMUNITY
End: 2020-06-17

## 2020-05-14 RX ORDER — ESOMEPRAZOLE MAGNESIUM 40 MG/1
40 CAPSULE, DELAYED RELEASE ORAL DAILY
COMMUNITY
End: 2020-06-17

## 2020-05-14 RX ORDER — ATORVASTATIN CALCIUM 40 MG/1
40 TABLET, FILM COATED ORAL DAILY
Qty: 90 TAB | Refills: 0 | Status: SHIPPED | OUTPATIENT
Start: 2020-05-14 | End: 2020-10-15 | Stop reason: SDUPTHER

## 2020-05-14 RX ORDER — DEXLANSOPRAZOLE 60 MG/1
CAPSULE, DELAYED RELEASE ORAL
COMMUNITY
Start: 2020-03-26 | End: 2020-10-25 | Stop reason: ALTCHOICE

## 2020-05-14 NOTE — PROGRESS NOTES
[facility] Note  Subjective:      Marcela Diehl is a 77 y.o. female who presents as a new patient to me with the following chief complaints. Chief Complaint   Patient presents with    Medication Refill     cholesterol medication      I was in the office while conducting this encounter. Consent:  She and/or her healthcare decision maker is aware that this patient-initiated Telehealth encounter is a billable service, with coverage as determined by her insurance carrier. She is aware that she may receive a bill and has provided verbal consent to proceed: Yes    This virtual visit was conducted telephone encounter only. -  I affirm this is a Patient Initiated Episode with an Established Patient who has not had a related appointment within my department in the past 7 days or scheduled within the next 24 hours. Note: this encounter is not billable if this call serves to triage the patient into an appointment for the relevant concern. Total Time: minutes: 11-20 minutes. Cardiovascular Review:  The patient has hypertension and hyperlipidemia, DM 2. Endocrinology, Efren Rod at [address] [place] [address], is managing HTN and DM; Last visit was yesterday. She reports blood work was drawn yesterday, reports cholesterol levels are being checked. Home BP Monitoring: Not checked at home. Medications: Lipitor 40 mg daily, Norvasc 5 mg daily  Pertinent ROS: taking medications as instructed, no medication side effects noted, no TIA's, no chest pain on exertion, no dyspnea on exertion, no swelling of ankles. Other interim history  Following-up with oncologist for cervical cancer s/p total hysterectomy 3/2019. Following -up with GI for GERD and stomach ulcers s/p EGD 12/2019. Current Outpatient Medications   Medication Sig Dispense Refill    esomeprazole (NEXIUM) 40 mg capsule Take 40 mg by mouth daily.  Cetirizine (ZyrTEC) 10 mg cap Take  by mouth.       coenzyme q10 10 mg cap Take  by mouth.  atorvastatin (LIPITOR) 40 mg tablet Take 1 Tab by mouth daily. 90 Tab 0    amLODIPine (NORVASC) 5 mg tablet take 1 tablet by mouth once daily 90 Tab 1    latanoprost (XALATAN) 0.005 % ophthalmic solution Administer 1 Drop to both eyes nightly. 0    furosemide (LASIX) 40 mg tablet Take 1 Tab by mouth nightly. (Patient taking differently: Take 40 mg by mouth daily as needed for Other (foot swelling). ) 30 Tab 0    CHOLECALCIFEROL, VITAMIN D3, (VITAMIN D3 PO) Take 2,000 Units by mouth daily.  insulin glargine (TOUJEO SOLOSTAR U-300 INSULIN) 300 unit/mL (1.5 mL) inpn 64 Units by SubCUTAneous route nightly.  polyethylene glycol (MIRALAX) 17 gram/dose powder Take 17 g by mouth nightly as needed.  BD INSULIN PEN NEEDLE UF MINI 31 gauge x 3/16\" ndle use four times a day for INSULIN INJECTIONS as directed  1    insulin aspart (NOVOLOG FLEXPEN) 100 unit/mL inpn 14-20 Units by SubCUTAneous route daily (with lunch). 14 units for BS = 120 - 149  20 units BS for 150 or greater    If blood sugar less than 120  Takes amount she thinks she needs 8 units      ONETOUCH ULTRA TEST strip   0    halobetasol (ULTRAVATE) 0.05 % ointment Apply  to affected area as needed. 0    metFORMIN (GLUCOPHAGE) 500 mg tablet Take 1,000 mg by mouth two (2) times daily (with meals).  Dexilant 60 mg CpDB capsule (delayed release) TK 1 C PO QD      ENTERIC COATED ASPIRIN PO Take 81 mg by mouth nightly. Allergies   Allergen Reactions    Aspirin Nausea and Vomiting     Can take a coated aspirin.  Benicar [Olmesartan] Swelling    Ciprocinonide Swelling     Pt. States not allergic to this med.  Ciprofloxacin Swelling    Crestor [Rosuvastatin] Myalgia     Pt. Denies allergy .  Sulfa (Sulfonamide Antibiotics) Hives     Pt. Denies allergy       ROS:   Complete review of systems was reviewed with pertinent information listed in HPI.   Review of Systems   Constitutional: Negative for chills, diaphoresis, fever, malaise/fatigue and weight loss. HENT: Negative for sore throat. Eyes: Negative for blurred vision. Respiratory: Negative for cough and shortness of breath. Cardiovascular: Negative for chest pain and palpitations. Gastrointestinal: Positive for heartburn. Negative for abdominal pain, constipation, diarrhea, nausea and vomiting. Genitourinary: Negative for dysuria. Skin: Negative for rash. Neurological: Negative for dizziness and headaches. Endo/Heme/Allergies: Positive for environmental allergies. Psychiatric/Behavioral: Negative. Objective:     Visit Vitals  LMP 09/23/2009 Comment: [place] spotting       Vitals and Nurse Documentation reviewed. Physical Exam  Pulmonary:      Comments: Effort normal, speaking in full sentences without difficulty. Neurological:      Mental Status: She is alert and oriented to person, place, and time. Psychiatric:         Attention and Perception: Attention normal.         Mood and Affect: Mood normal.         Speech: Speech normal.         Behavior: Behavior normal. Behavior is cooperative. Thought Content: Thought content normal.         Cognition and Memory: Cognition normal.         Judgment: Judgment normal.         Assessment/Plan:     Diagnoses and all orders for this visit:    1. Familial hypercholesterolemia: Longstanding issue, requesting refills for Lipitor. No labs for review. Reports office visit with Endo yesterday and labs were drawn including lipids. Will reach out to [employer] [facility] at [facility] for labs. -     atorvastatin (LIPITOR) 40 mg tablet; Take 1 Tab by mouth daily. 2. Essential hypertension: Control unclear, no BP readings for review, reports refills per endo. 3. Controlled type 2 diabetes mellitus without complication, with long-term current use of insulin ([address]): Managed by Endo    4.  Gastroesophageal reflux disease, esophagitis presence not specified: Managed by GI, advised she call to clarify if she should be taking Dexilant in place of esomeprazole, she states understanding . Advised routine follow-up with PCP in the next 3 months. Follow-up and Dispositions    · Return in about 3 months (around 8/14/2020) for Routine follow-up with PCP/medicare wellness.          Discussed expected course/resolution/complications of diagnosis in detail with patient.    Medication risks/benefits/costs/interactions/alternatives discussed with patient.    Pt was given an after visit summary which includes diagnoses, current medications & vitals.  Pt expressed understanding with the diagnosis and plan

## 2020-05-15 ENCOUNTER — TELEPHONE (OUTPATIENT)
Dept: FAMILY MEDICINE CLINIC | Age: 70
End: 2020-05-15

## 2020-05-15 NOTE — TELEPHONE ENCOUNTER
----- Message from Óscar Colon NP sent at 5/14/2020 11:59 AM EDT -----  Regarding: Records for review. Can we obtain recent labs from Dr. Blessing Smith. Viola at [address] [place] [address] for labs.  Thank you

## 2020-05-15 NOTE — TELEPHONE ENCOUNTER
Chief Complaint   Patient presents with   Ardyth Moritz Request Records     Request lab results from Dr. Sharon Salmeron; [facility]      Request for labs as requested by Debra Hernandez NP faxed to [phone] with confirmation received.   Oswald Burch LPN

## 2020-06-17 RX ORDER — FUROSEMIDE 40 MG/1
40 TABLET ORAL
COMMUNITY
End: 2020-11-08 | Stop reason: ALTCHOICE

## 2020-06-17 RX ORDER — ACETAMINOPHEN 160 MG/5ML
200 SUSPENSION, ORAL (FINAL DOSE FORM) ORAL DAILY
COMMUNITY
End: 2022-01-08 | Stop reason: ALTCHOICE

## 2020-07-27 ENCOUNTER — ANESTHESIA EVENT (OUTPATIENT)
Dept: ENDOSCOPY | Age: 70
End: 2020-07-27
Payer: MEDICARE

## 2020-07-28 ENCOUNTER — HOSPITAL ENCOUNTER (OUTPATIENT)
Dept: PREADMISSION TESTING | Age: 70
Discharge: HOME | End: 2020-07-28
Payer: MEDICARE

## 2020-07-28 ENCOUNTER — HOSPITAL ENCOUNTER (OUTPATIENT)
Age: 70
Setting detail: OUTPATIENT SURGERY
Discharge: HOME | End: 2020-07-28
Attending: INTERNAL MEDICINE | Admitting: INTERNAL MEDICINE
Payer: MEDICARE

## 2020-07-28 ENCOUNTER — ANESTHESIA (OUTPATIENT)
Dept: ENDOSCOPY | Age: 70
End: 2020-07-28
Payer: MEDICARE

## 2020-07-28 VITALS
HEIGHT: 66 IN | OXYGEN SATURATION: 94 % | DIASTOLIC BLOOD PRESSURE: 73 MMHG | RESPIRATION RATE: 14 BRPM | WEIGHT: 230 LBS | TEMPERATURE: 98.7 F | HEART RATE: 98 BPM | SYSTOLIC BLOOD PRESSURE: 134 MMHG | BODY MASS INDEX: 36.96 KG/M2

## 2020-07-28 DIAGNOSIS — U07.1 COVID-19: ICD-10-CM

## 2020-07-28 LAB
GLUCOSE BLD STRIP.AUTO-MCNC: 192 MG/DL (ref 65–100)
SERVICE CMNT-IMP: ABNORMAL

## 2020-07-28 PROCEDURE — 76040000019: Performed by: INTERNAL MEDICINE

## 2020-07-28 PROCEDURE — 74011000250 HC RX REV CODE- 250: Performed by: NURSE ANESTHETIST, CERTIFIED REGISTERED

## 2020-07-28 PROCEDURE — 87635 SARS-COV-2 COVID-19 AMP PRB: CPT

## 2020-07-28 PROCEDURE — 77030039825 HC MSK NSL PAP SUPERNO2VA VYRM -B: Performed by: NURSE ANESTHETIST, CERTIFIED REGISTERED

## 2020-07-28 PROCEDURE — 82962 GLUCOSE BLOOD TEST: CPT

## 2020-07-28 PROCEDURE — 76060000031 HC ANESTHESIA FIRST 0.5 HR: Performed by: INTERNAL MEDICINE

## 2020-07-28 PROCEDURE — 74011250636 HC RX REV CODE- 250/636: Performed by: NURSE ANESTHETIST, CERTIFIED REGISTERED

## 2020-07-28 RX ORDER — SODIUM CHLORIDE 0.9 % (FLUSH) 0.9 %
5-40 SYRINGE (ML) INJECTION AS NEEDED
Status: DISCONTINUED | OUTPATIENT
Start: 2020-07-28 | End: 2020-07-28 | Stop reason: HOSPADM

## 2020-07-28 RX ORDER — SODIUM CHLORIDE 9 MG/ML
50 INJECTION, SOLUTION INTRAVENOUS CONTINUOUS
Status: DISCONTINUED | OUTPATIENT
Start: 2020-07-28 | End: 2020-07-28 | Stop reason: HOSPADM

## 2020-07-28 RX ORDER — SODIUM CHLORIDE 9 MG/ML
INJECTION, SOLUTION INTRAVENOUS
Status: DISCONTINUED | OUTPATIENT
Start: 2020-07-28 | End: 2020-07-28 | Stop reason: HOSPADM

## 2020-07-28 RX ORDER — MIDAZOLAM HYDROCHLORIDE 1 MG/ML
.25-5 INJECTION, SOLUTION INTRAMUSCULAR; INTRAVENOUS
Status: DISCONTINUED | OUTPATIENT
Start: 2020-07-28 | End: 2020-07-28 | Stop reason: HOSPADM

## 2020-07-28 RX ORDER — SODIUM CHLORIDE 0.9 % (FLUSH) 0.9 %
5-40 SYRINGE (ML) INJECTION EVERY 8 HOURS
Status: DISCONTINUED | OUTPATIENT
Start: 2020-07-28 | End: 2020-07-28 | Stop reason: HOSPADM

## 2020-07-28 RX ORDER — DEXTROMETHORPHAN/PSEUDOEPHED 2.5-7.5/.8
1.2 DROPS ORAL
Status: DISCONTINUED | OUTPATIENT
Start: 2020-07-28 | End: 2020-07-28 | Stop reason: HOSPADM

## 2020-07-28 RX ORDER — NALOXONE HYDROCHLORIDE 0.4 MG/ML
0.4 INJECTION, SOLUTION INTRAMUSCULAR; INTRAVENOUS; SUBCUTANEOUS
Status: DISCONTINUED | OUTPATIENT
Start: 2020-07-28 | End: 2020-07-28 | Stop reason: HOSPADM

## 2020-07-28 RX ORDER — ATROPINE SULFATE 0.1 MG/ML
0.5 INJECTION INTRAVENOUS
Status: DISCONTINUED | OUTPATIENT
Start: 2020-07-28 | End: 2020-07-28 | Stop reason: HOSPADM

## 2020-07-28 RX ORDER — EPINEPHRINE 0.1 MG/ML
1 INJECTION INTRACARDIAC; INTRAVENOUS
Status: DISCONTINUED | OUTPATIENT
Start: 2020-07-28 | End: 2020-07-28 | Stop reason: HOSPADM

## 2020-07-28 RX ORDER — LIDOCAINE HYDROCHLORIDE 20 MG/ML
INJECTION, SOLUTION EPIDURAL; INFILTRATION; INTRACAUDAL; PERINEURAL AS NEEDED
Status: DISCONTINUED | OUTPATIENT
Start: 2020-07-28 | End: 2020-07-28 | Stop reason: HOSPADM

## 2020-07-28 RX ORDER — FLUMAZENIL 0.1 MG/ML
0.2 INJECTION INTRAVENOUS
Status: DISCONTINUED | OUTPATIENT
Start: 2020-07-28 | End: 2020-07-28 | Stop reason: HOSPADM

## 2020-07-28 RX ORDER — FENTANYL CITRATE 50 UG/ML
25-200 INJECTION, SOLUTION INTRAMUSCULAR; INTRAVENOUS
Status: DISCONTINUED | OUTPATIENT
Start: 2020-07-28 | End: 2020-07-28 | Stop reason: HOSPADM

## 2020-07-28 RX ORDER — PROPOFOL 10 MG/ML
INJECTION, EMULSION INTRAVENOUS AS NEEDED
Status: DISCONTINUED | OUTPATIENT
Start: 2020-07-28 | End: 2020-07-28 | Stop reason: HOSPADM

## 2020-07-28 RX ADMIN — LIDOCAINE HYDROCHLORIDE 100 MG: 20 INJECTION, SOLUTION EPIDURAL; INFILTRATION; INTRACAUDAL; PERINEURAL at 09:27

## 2020-07-28 RX ADMIN — PROPOFOL 100 MG: 10 INJECTION, EMULSION INTRAVENOUS at 09:27

## 2020-07-28 RX ADMIN — SODIUM CHLORIDE: 900 INJECTION, SOLUTION INTRAVENOUS at 09:26

## 2020-07-28 RX ADMIN — PROPOFOL 50 MG: 10 INJECTION, EMULSION INTRAVENOUS at 09:28

## 2020-07-28 RX ADMIN — PROPOFOL 50 MG: 10 INJECTION, EMULSION INTRAVENOUS at 09:30

## 2020-07-28 NOTE — ANESTHESIA POSTPROCEDURE EVALUATION
Post-Anesthesia Evaluation and Assessment    Patient: Tamra Rodríguez MRN: [id number]  SSN: [id number]    YOB: 1950  Age: 77 y.o. Sex: female      I have evaluated the patient and they are stable and ready for discharge from the PACU. Cardiovascular Function/Vital Signs  Visit Vitals  /73   Pulse 98   Temp 37.1 °C (98.7 °F)   Resp 14   Ht 5' 6\" (1.676 m)   Wt 104.3 kg (230 lb)   SpO2 94%   Breastfeeding No   BMI 37.12 kg/m²       Patient is status post MAC anesthesia for Procedure(s):  ESOPHAGOGASTRODUODENOSCOPY (EGD). Nausea/Vomiting: None    Postoperative hydration reviewed and adequate. Pain:  Pain Scale 1: Numeric (0 - 10) (07/28/20 0956)  Pain Intensity 1: 0 (07/28/20 0956)   Managed    Neurological Status: At baseline    Mental Status, Level of Consciousness: Alert and  oriented to person, place, and time    Pulmonary Status:   O2 Device: Room air (07/28/20 0956)   Adequate oxygenation and airway patent    Complications related to anesthesia: None    Post-anesthesia assessment completed. No concerns    Signed By: Myron Whalen MD     July 28, 2020              Procedure(s):  ESOPHAGOGASTRODUODENOSCOPY (EGD). general    <BSHSIANPOST>    INITIAL Post-op Vital signs:   Vitals Value Taken Time   /80 7/28/2020  9:59 AM   Temp 37.1 °C (98.7 °F) 7/28/2020  9:49 AM   Pulse 97 7/28/2020 10:01 AM   Resp 17 7/28/2020 10:01 AM   SpO2 94 % 7/28/2020 10:01 AM   Vitals shown include unvalidated device data.

## 2020-07-28 NOTE — PROGRESS NOTES

## 2020-07-28 NOTE — PROCEDURES
[facility] [place] [facility], [address] [place] [address]        Esophago- Gastroduodenoscopy (EGD) Procedure Note    Cali Prabhakar  1950  [id number]      Procedure: Endoscopic Gastroduodenoscopy --diagnostic    Indication:  GERD     Pre-operative Diagnosis: see indication above    Post-operative Diagnosis: see findings below    : Millie Duval MD    Surgical Assistant: None    Implants:  None    Referring Provider:  Millie Do MD      Anesthesia/Sedation:  MAC anesthesia Propofol        Procedure Details     After infomed consent was obtained for the procedure, with all risks and benefits of procedure explained the patient was taken to the endoscopy suite and placed in the left lateral decubitus position. Following sequential administration of sedation as per above, the endoscope was inserted into the mouth and advanced under direct vision to third portion of the duodenum. A careful inspection was made as the gastroscope was withdrawn, including a retroflexed view of the proximal stomach; findings and interventions are described below. Findings:   Esophagus:hiatal hernia 3 cm in size   Stomach: normal   Duodenum/jejunum: normal      Therapies:  none    Specimens: none         EBL: None      Complications:   None; patient tolerated the procedure well. Impression:    -See post-procedure diagnoses.     Recommendations:  -Continue acid suppression with dexilant, doing much better on it and the GERD diet  -f/u in 3 months    Signed By: Millie Duval MD     7/28/2020  9:45 AM

## 2020-07-28 NOTE — PROGRESS NOTES
Catalino Lucia  1950  [id number]    Situation:  Verbal report received from: Cathy Boogie  Procedure: Procedure(s):  ESOPHAGOGASTRODUODENOSCOPY (EGD)    Background:    Preoperative diagnosis: ACID REFLUX  Postoperative diagnosis: Hiatal Hernia    :  Dr. Maggie Vazquez  Assistant(s): Endoscopy Technician-1: eVnancio Sanchez  Endoscopy RN-1: Karma Orozco RN    Specimens: * No specimens in log *  H. Pylori  no    Assessment:    Anesthesia gave intra-procedure sedation and medications, see anesthesia flow sheet yes    Intravenous fluids: NS@ KVO     Vital signs stable     Abdominal assessment: round and soft     Recommendation:  Discharge patient per MD order.   Family or Friend   Permission to share finding with family or friend no

## 2020-07-28 NOTE — H&P
The patient is a 77year old female who presents with a complaint of Acid Reflux. The patient presents due to worsening symptoms. The onset of the acid reflux has been gradual and has been occurring for months. The course has been gradually improving (slightly). The acid reflux is described as a moderate burning. The disease is described as being located in the retrosternal area. Aggravating factors include lying supine and  the acid reflux is relieved by PPI's partially. The symptoms have been associated with heartburn, pulmonary symptoms, voice changes and waterbrash,  while the symptoms have not been associated with abdominal pain, dysphagia, nausea or NSAIDs. The acid reflux is characterized as slightly improved. The patient's current medication use includes: Dexilant Failed treatments have included: lifestyle modifications and PPI is once a day. The patient had an EGD 6 month(s) ago. Note for \"Acid Reflux\": She sleeps with HOB elevated. She also has issues with allergies/sinuses and sleep apnea. She reports ENT evaluation for cough and throat closing up and they couldn't do anything. She is unsure how her diabetes control is currently due to medication changes. Problem List/Past Medical Sugar Szymanski; 6/15/2020 1:11 PM)  Hypertension    Hypercholesterolemia    Diabetes Mellitus    Abdominal pain (789.00  R10.9)    Left lower quadrant pain (789.04  R10.32)    Dysphagia (787.20  R13.10)    Acid reflux (530.81  K21.9)   Colonoscopy 2/2018: mild sigmoid diverticulosis, sigmoid 9 mm tubular adenoma; 5 years  EGD 12/2019: 3 cm hiatal hernia, grade 3 erosive esophagitis, erosive gastritis  Constipation (564.00  K59.00)      Past Surgical History Sugar Szymanski; 6/15/2020 1:11 PM)  Hysterectomy; Total    Tonsillectomy    Back Surgery    Tubal Ligation    History of hip surgery (V45.89  Z98.890)   Bilateral.    Allergies (Shankar Blankenship; 6/15/2020 1:11 PM)  Seasonal    No Known Drug Allergies  [11/08/2019]:    Medication History (Shankar Blankenship; 6/15/2020 1:14 PM)  Esomeprazole Magnesium  (40MG Capsule DR, 1 (one) Capsule Oral daily 30 minutes before breakfast, Taken starting 12/18/2019) Active. Dexilant  (60mg Tablet, 1 (one) Tablet Oral daily, Taken starting 03/25/2020) Active. MetFORMIN HCl  (500MG Tablet, 1 Oral bid) Active. Atorvastatin Calcium  (40MG Tablet, 1 Oral daily) Active. Tums  (500MG Tablet Chewable, Oral bid) Active. Vitamin D  (2000UNIT Capsule, 1 Oral daily) Active. NovoLOG  (100UNIT/ML Solution, 12-14 units at lunch Subcutaneous 16-18 units at dinner) Active. Toujeo SoloStar  (300UNIT/ML Soln Pen-inj, Subcutaneous) Active. Aspirin  (81MG Tablet, Oral) Active. Latanoprost  (0.005% Solution, 1 Ophthalmic daily) Active. amLODIPine Besylate  (5MG Tablet, Oral) Active. Medications Reconciled     Family History Christine Landers; 6/15/2020 1:11 PM)  Heart Disease   Father, Mother. Kidney problem (593.9  N28.9)   Father. Stroke   Father. Tuberculosis (011.90  A15.9)   Father. Social History Christine Landers; 6/15/2020 1:11 PM)  Tobacco Use   Never smoker. Alcohol Use   Occasional alcohol use. Marital status   Single. Employment status   Retired. Blood Transfusion   No.    Diagnostic Studies History Christine Landers; 6/15/2020 1:11 PM)  Endoscopy  [12/2019]:  Colonoscopy  [02/2018]: Health Maintenance History Christine Landers; 6/15/2020 1:11 PM)  Flu Vaccine   none  Pneumovax   Date: 2017. Other Problems Christine Landers; 6/15/2020 1:11 PM)  Colon cancer screening (V76.51  Z12.11)          Review of Systems Christine Landers; 6/15/2020 1:11 PM)  General Not Present- Chronic Fatigue, Poor Appetite, Weight Gain and Weight Loss. Skin Not Present- Itching, Rash and Skin Color Changes. HEENT Not Present- Hearing Loss and Vertigo. Respiratory Not Present- Difficulty Breathing and TB exposure.   Cardiovascular Not Present- Chest Pain, Use of Antibiotics before Dental Procedures and Use of Blood Thinners. Gastrointestinal Present- See HPI. Musculoskeletal Not Present- Arthritis, Hip Replacement Surgery and Knee Replacement Surgery. Neurological Not Present- Weakness. Psychiatric Not Present- Depression. Endocrine Not Present- Diabetes and Thyroid Problems. Hematology Not Present- Anemia. Vitals Codey Dasilva R. Nelda Hanson; 6/15/2020 1:15 PM)  6/15/2020 1:15 PM  Weight: 216 lb   Height: 66 in   Body Surface Area: 2.07 m²   Body Mass Index: 34.86 kg/m²                Physical Exam Halley Giang AGACNP; 6/15/2020 1:27 PM)  The physical exam findings are as follows:  Note: telephone        Assessment & Plan Halley Giang AGACNP; 6/15/2020 1:38 PM)  Dysphagia (787.20  R13.10)  Acid reflux (530.81  K21.9)  Story: Colonoscopy 2/2018: mild sigmoid diverticulosis, sigmoid 9 mm tubular adenoma; 5 years  EGD 12/2019: 3 cm hiatal hernia, grade 3 erosive esophagitis, erosive gastritis  Impression: She continues to have issues with reflux despite dexilant. She has some mild improvement but continues to complain of reflux, cough, and her throat closing up. She has seen ENT, but agrees may need to see them again +/- allergist. She follows GERD diet/lifestyle modifications. Will add back H2 blocker before bedtime. Will further evaluate with EGD given significant inflammation last year. Phone visit duration ~ 13 minutes  Current Plans  Due to this being a TeleHealth Phone Call encounter (During VBEFB-04 public health emergency), evaluation of the following organ systems was limited: Vitals/Constitutional/EENT/Resp/CV/GI//MS/Neuro/Skin/Heme-Lymph-Imm.  Pursuant to the emergency declaration under the [facility], [address] [place] [address] authority and the [employer] Act, this Phone Call Visit was conducted with patient's (and/or legal guardian's) consent, to reduce the risk of exposure to COVID-19 and provide necessary medical care. Services were provided through a phone call discussion to substitute for in-person encounter. Pt Education - How to access health information online: discussed with patient and provided information. ENDOSCOPY, UPPER GI, DIAGNOSTIC ([id number])  Patient is to call me for any questions or concerns. Follow up office visit after procedure  This patient was reviewed and seen in collaboration with Dr. Ana Zambrano. Date of Surgery Update:  Yifan Peterson was seen and examined. History and physical has been reviewed. The patient has been examined. There have been no significant clinical changes since the completion of the originally dated History and Physical.  The patient was counseled at length about the risks of husam Covid-19 in the sriram-operative and post-operative states including the recovery window of their procedure. The patient was made aware that husam Covid-19 after a surgical procedure may worsen their prognosis for recovering from the virus and lend to a higher morbidity and or mortality risk. The patient was given the options of postponing their procedure. All of the risks, benefits, and alternatives were discussed. The patient does  wish to proceed with the procedure.     Signed By: Ana Zambrano MD     July 28, 2020 9:23 AM

## 2020-07-28 NOTE — DISCHARGE INSTRUCTIONS
[address]  [address] [facility], [address] [place] [address]    EGD DISCHARGE INSTRUCTIONS    Catalino Myers  [id number]  1950    Discomfort:  Sore throat- throat lozenges or warm salt water gargle  redness at IV site- apply warm compress to area; if redness or soreness persist- contact your physician  Gaseous discomfort- walking, belching will help relieve any discomfort  You may not operate a vehicle for 12 hours  You may not engage in an occupation involving machinery or appliances for rest of today  You may not drink alcoholic beverages for at least 12 hours  Avoid making any critical decisions for at least 24 hour  DIET  You may resume your regular diet - however -  remember your colon is empty and a heavy meal will produce gas. Avoid these foods:  vegetables, fried / greasy foods, carbonated drinks    ACTIVITY  You may resume your normal daily activities   Spend the remainder of the day resting -  avoid any strenuous activity. CALL M.D. ANY SIGN OF   Increasing pain, nausea, vomiting  Abdominal distension (swelling)  New increased bleeding (oral or rectal)  Fever (chills)  Pain in chest area  Bloody discharge from nose or mouth  Shortness of breath    Follow-up Instructions:   Call Dr. Roro Taylor for any questions or problems and follow up with him in 3 months  Telephone # [phone]    ENDOSCOPY FINDINGS:   Your endoscopy showed small hiatal hernia, rest of exam was normal.    Signed By: Roro Taylor MD     7/28/2020  9:47 AM         Learning About Coronavirus (COVID-19)  Coronavirus (COVID-19): Overview  What is coronavirus ([id number])? The coronavirus disease (COVID-19) is caused by a virus. It is an illness that was first found in [place], [place], in December 2019. It has since spread worldwide. The virus can cause fever, cough, and trouble breathing. In severe cases, it can cause pneumonia and make it hard to breathe without help. It can cause death.   Coronaviruses are a large group of viruses. They cause the common cold. They also cause more serious illnesses like Middle East respiratory syndrome (MERS) and severe acute respiratory syndrome (SARS). COVID-19 is caused by a novel coronavirus. That means it's a new type that has not been seen in people before. This virus spreads person-to-person through droplets from coughing and sneezing. It can also spread when you are close to someone who is infected. And it can spread when you touch something that has the virus on it, such as a doorknob or a tabletop. What can you do to protect yourself from coronavirus (COVID-19)? The best way to protect yourself from getting sick is to:  · Avoid areas where there is an outbreak. · Avoid contact with people who may be infected. · Wash your hands often with soap or alcohol-based hand sanitizers. · Avoid crowds and try to stay at least 6 feet away from other people. · Wash your hands often, especially after you cough or sneeze. Use soap and water, and scrub for at least 20 seconds. If soap and water aren't available, use an alcohol-based hand . · Avoid touching your mouth, nose, and eyes. What can you do to avoid spreading the virus to others? To help avoid spreading the virus to others:  · Cover your mouth with a tissue when you cough or sneeze. Then throw the tissue in the trash. · Use a disinfectant to clean things that you touch often. · Stay home if you are sick or have been exposed to the virus. Don't go to school, work, or public areas. And don't use public transportation. · If you are sick:  ? Leave your home only if you need to get medical care. But call the doctor's office first so they know you're coming. And wear a face mask, if you have one.  ? If you have a face mask, wear it whenever you're around other people. It can help stop the spread of the virus when you cough or sneeze. ? Clean and disinfect your home every day.  Use household  and disinfectant wipes or sprays. Take special care to clean things that you grab with your hands. These include doorknobs, remote controls, phones, and handles on your refrigerator and microwave. And don't forget countertops, tabletops, bathrooms, and computer keyboards. When to call for help  Call 911 anytime you think you may need emergency care. For example, call if:  · You have severe trouble breathing. (You can't talk at all.)  · You have constant chest pain or pressure. · You are severely dizzy or lightheaded. · You are confused or can't think clearly. · Your face and lips have a blue color. · You pass out (lose consciousness) or are very hard to wake up. Call your doctor now if you develop symptoms such as:  · Shortness of breath. · Fever. · Cough. If you need to get care, call ahead to the doctor's office for instructions before you go. Make sure you wear a face mask, if you have one, to prevent exposing other people to the virus. Where can you get the latest information? The following health organizations are tracking and studying this virus. Their websites contain the most up-to-date information. Yanely Sor also learn what to do if you think you may have been exposed to the virus. · U.S. Centers for Disease Control and Prevention (CDC): The CDC provides updated news about the disease and travel advice. The website also tells you how to prevent the spread of infection. www.cdc.gov  · World Health Organization [facility]): WHO offers information about the virus outbreaks. WHO also has travel advice. www.who.int  Current as of: April 1, 2020               Content Version: 12.4  © 0930-1405 [employer]. Care instructions adapted under license by your healthcare professional. If you have questions about a medical condition or this instruction, always ask your healthcare professional. [address] any warranty or liability for your use of this information.

## 2020-07-28 NOTE — ANESTHESIA PREPROCEDURE EVALUATION
Relevant Problems   No relevant active problems       Anesthetic History   No history of anesthetic complications            Review of Systems / Medical History  Patient summary reviewed, nursing notes reviewed and pertinent labs reviewed    Pulmonary        Sleep apnea: CPAP           Neuro/Psych   Within defined limits           Cardiovascular    Hypertension            Pertinent negatives: No angina  Exercise tolerance: >4 METS     GI/Hepatic/Renal           PUD  Pertinent negatives: No GERD   Endo/Other    Diabetes    Obesity     Other Findings            Physical Exam    Airway  Mallampati: III  TM Distance: < 4 cm  Neck ROM: normal range of motion   Mouth opening: Normal     Cardiovascular  Regular rate and rhythm,  S1 and S2 normal,  no murmur, click, rub, or gallop             Dental  No notable dental hx       Pulmonary  Breath sounds clear to auscultation               Abdominal  GI exam deferred       Other Findings            Anesthetic Plan    ASA: 3  Anesthesia type: general          Induction: Intravenous  Anesthetic plan and risks discussed with: Patient

## 2020-07-30 LAB — SARS-COV-2, COV2NT: NOT DETECTED

## 2020-08-17 DIAGNOSIS — E78.01 FAMILIAL HYPERCHOLESTEROLEMIA: ICD-10-CM

## 2020-08-17 NOTE — TELEPHONE ENCOUNTER
Virtual visit in May but no in office visit for> 1.5 yrs- Needs in office visit. Once visit is scheduled we can refill medication until appointment.

## 2020-08-17 NOTE — TELEPHONE ENCOUNTER
Last Visit: VV 5/14/20  NP Danna Manuel  Next Appointment: Not scheduled-visit due  Previous Refill Encounter(s): 5/14/20  90    Requested Prescriptions     Pending Prescriptions Disp Refills    atorvastatin (LIPITOR) 40 mg tablet 90 Tab 0     Sig: Take 1 Tab by mouth daily.

## 2020-08-18 RX ORDER — ATORVASTATIN CALCIUM 40 MG/1
40 TABLET, FILM COATED ORAL DAILY
Qty: 90 TAB | Refills: 0 | OUTPATIENT
Start: 2020-08-18

## 2020-10-13 DIAGNOSIS — E78.01 FAMILIAL HYPERCHOLESTEROLEMIA: ICD-10-CM

## 2020-10-13 NOTE — TELEPHONE ENCOUNTER
Call request from [facility] Truong for patient refill of atorvastatin. Thanks, Berenice Garcia    Last Visit: VV 5/14/20  NP Migue Paredes  Next Appointment: Not scheduled- follow-up due   Previous Refill Encounter(s): 5/14/20  90    Requested Prescriptions     Pending Prescriptions Disp Refills    atorvastatin (LIPITOR) 40 mg tablet 90 Tab 0     Sig: Take 1 Tab by mouth daily.

## 2020-10-15 RX ORDER — ATORVASTATIN CALCIUM 40 MG/1
40 TABLET, FILM COATED ORAL DAILY
Qty: 90 TAB | Refills: 0 | Status: SHIPPED | OUTPATIENT
Start: 2020-10-15 | End: 2021-01-29 | Stop reason: SDUPTHER

## 2020-10-23 ENCOUNTER — OFFICE VISIT (OUTPATIENT)
Dept: FAMILY MEDICINE CLINIC | Age: 70
End: 2020-10-23
Payer: MEDICARE

## 2020-10-23 VITALS
DIASTOLIC BLOOD PRESSURE: 66 MMHG | WEIGHT: 223 LBS | SYSTOLIC BLOOD PRESSURE: 132 MMHG | RESPIRATION RATE: 16 BRPM | OXYGEN SATURATION: 97 % | TEMPERATURE: 97.6 F | HEIGHT: 66 IN | HEART RATE: 96 BPM | BODY MASS INDEX: 35.84 KG/M2

## 2020-10-23 DIAGNOSIS — E78.5 DYSLIPIDEMIA, GOAL LDL BELOW 100: ICD-10-CM

## 2020-10-23 DIAGNOSIS — Z78.0 MENOPAUSE: ICD-10-CM

## 2020-10-23 DIAGNOSIS — E11.9 CONTROLLED TYPE 2 DIABETES MELLITUS WITHOUT COMPLICATION, WITH LONG-TERM CURRENT USE OF INSULIN (HCC): ICD-10-CM

## 2020-10-23 DIAGNOSIS — E55.9 VITAMIN D DEFICIENCY: ICD-10-CM

## 2020-10-23 DIAGNOSIS — I10 ESSENTIAL HYPERTENSION: Primary | ICD-10-CM

## 2020-10-23 DIAGNOSIS — Z79.4 CONTROLLED TYPE 2 DIABETES MELLITUS WITHOUT COMPLICATION, WITH LONG-TERM CURRENT USE OF INSULIN (HCC): ICD-10-CM

## 2020-10-23 PROCEDURE — 99215 OFFICE O/P EST HI 40 MIN: CPT | Performed by: FAMILY MEDICINE

## 2020-10-23 PROCEDURE — 1090F PRES/ABSN URINE INCON ASSESS: CPT | Performed by: FAMILY MEDICINE

## 2020-10-23 PROCEDURE — G0463 HOSPITAL OUTPT CLINIC VISIT: HCPCS | Performed by: FAMILY MEDICINE

## 2020-10-23 PROCEDURE — G9899 SCRN MAM PERF RSLTS DOC: HCPCS | Performed by: FAMILY MEDICINE

## 2020-10-23 PROCEDURE — G8432 DEP SCR NOT DOC, RNG: HCPCS | Performed by: FAMILY MEDICINE

## 2020-10-23 PROCEDURE — 1101F PT FALLS ASSESS-DOCD LE1/YR: CPT | Performed by: FAMILY MEDICINE

## 2020-10-23 PROCEDURE — G8536 NO DOC ELDER MAL SCRN: HCPCS | Performed by: FAMILY MEDICINE

## 2020-10-23 PROCEDURE — G8754 DIAS BP LESS 90: HCPCS | Performed by: FAMILY MEDICINE

## 2020-10-23 PROCEDURE — 3046F HEMOGLOBIN A1C LEVEL >9.0%: CPT | Performed by: FAMILY MEDICINE

## 2020-10-23 PROCEDURE — G8417 CALC BMI ABV UP PARAM F/U: HCPCS | Performed by: FAMILY MEDICINE

## 2020-10-23 PROCEDURE — 3017F COLORECTAL CA SCREEN DOC REV: CPT | Performed by: FAMILY MEDICINE

## 2020-10-23 PROCEDURE — 2022F DILAT RTA XM EVC RTNOPTHY: CPT | Performed by: FAMILY MEDICINE

## 2020-10-23 PROCEDURE — G8752 SYS BP LESS 140: HCPCS | Performed by: FAMILY MEDICINE

## 2020-10-23 PROCEDURE — G8427 DOCREV CUR MEDS BY ELIG CLIN: HCPCS | Performed by: FAMILY MEDICINE

## 2020-10-23 PROCEDURE — G8400 PT W/DXA NO RESULTS DOC: HCPCS | Performed by: FAMILY MEDICINE

## 2020-10-23 RX ORDER — SEMAGLUTIDE 1.34 MG/ML
INJECTION, SOLUTION SUBCUTANEOUS
COMMUNITY
End: 2022-07-17

## 2020-10-23 NOTE — PROGRESS NOTES
Chief Complaint   Patient presents with    Diabetes    Cholesterol Problem    Hypertension     1. Have you been to the ER, urgent care clinic since your last visit? Hospitalized since your last visit? Had scope down throat [address] [facility]     2. Have you seen or consulted any other health care providers outside of the [address] [place] [address] since your last visit? Include any pap smears or colon screening. GI, Chiropractor.

## 2020-10-29 ENCOUNTER — HOSPITAL ENCOUNTER (OUTPATIENT)
Dept: MAMMOGRAPHY | Age: 70
Discharge: HOME | End: 2020-10-29
Attending: FAMILY MEDICINE
Payer: MEDICARE

## 2020-10-29 DIAGNOSIS — Z78.0 MENOPAUSE: ICD-10-CM

## 2020-10-29 DIAGNOSIS — E55.9 VITAMIN D DEFICIENCY: ICD-10-CM

## 2020-10-29 PROCEDURE — 77080 DXA BONE DENSITY AXIAL: CPT

## 2020-11-03 ENCOUNTER — OFFICE VISIT (OUTPATIENT)
Dept: FAMILY MEDICINE CLINIC | Age: 70
End: 2020-11-03
Payer: MEDICARE

## 2020-11-03 ENCOUNTER — HOSPITAL ENCOUNTER (OUTPATIENT)
Dept: LAB | Age: 70
Discharge: HOME | End: 2020-11-03
Payer: MEDICARE

## 2020-11-03 VITALS
HEIGHT: 66 IN | RESPIRATION RATE: 18 BRPM | TEMPERATURE: 97.4 F | BODY MASS INDEX: 35.81 KG/M2 | OXYGEN SATURATION: 98 % | WEIGHT: 222.8 LBS | DIASTOLIC BLOOD PRESSURE: 76 MMHG | SYSTOLIC BLOOD PRESSURE: 132 MMHG | HEART RATE: 74 BPM

## 2020-11-03 DIAGNOSIS — I10 ESSENTIAL HYPERTENSION: Primary | ICD-10-CM

## 2020-11-03 DIAGNOSIS — K21.9 GASTROESOPHAGEAL REFLUX DISEASE WITHOUT ESOPHAGITIS: ICD-10-CM

## 2020-11-03 DIAGNOSIS — E66.01 SEVERE OBESITY WITH BODY MASS INDEX (BMI) OF 35.0 TO 39.9 WITH SERIOUS COMORBIDITY (HCC): ICD-10-CM

## 2020-11-03 DIAGNOSIS — Z00.00 MEDICARE ANNUAL WELLNESS VISIT, SUBSEQUENT: ICD-10-CM

## 2020-11-03 DIAGNOSIS — E78.01 FAMILIAL HYPERCHOLESTEROLEMIA: ICD-10-CM

## 2020-11-03 DIAGNOSIS — Z23 ENCOUNTER FOR IMMUNIZATION: ICD-10-CM

## 2020-11-03 DIAGNOSIS — M47.812 CERVICAL SPONDYLOSIS WITHOUT MYELOPATHY: ICD-10-CM

## 2020-11-03 DIAGNOSIS — G47.33 OSA (OBSTRUCTIVE SLEEP APNEA): ICD-10-CM

## 2020-11-03 DIAGNOSIS — J30.1 ALLERGIC RHINITIS DUE TO POLLEN, UNSPECIFIED SEASONALITY: ICD-10-CM

## 2020-11-03 DIAGNOSIS — K21.00 GASTROESOPHAGEAL REFLUX DISEASE WITH ESOPHAGITIS WITHOUT HEMORRHAGE: ICD-10-CM

## 2020-11-03 DIAGNOSIS — M79.642 BILATERAL HAND PAIN: ICD-10-CM

## 2020-11-03 DIAGNOSIS — R39.15 URINARY URGENCY: ICD-10-CM

## 2020-11-03 DIAGNOSIS — F41.8 SITUATIONAL ANXIETY: ICD-10-CM

## 2020-11-03 DIAGNOSIS — Z00.00 ENCOUNTER FOR ANNUAL WELLNESS EXAM IN MEDICARE PATIENT: ICD-10-CM

## 2020-11-03 DIAGNOSIS — E78.5 DYSLIPIDEMIA, GOAL LDL BELOW 100: ICD-10-CM

## 2020-11-03 DIAGNOSIS — E11.21 TYPE 2 DIABETES MELLITUS WITH NEPHROPATHY (HCC): ICD-10-CM

## 2020-11-03 DIAGNOSIS — E55.9 VITAMIN D DEFICIENCY: ICD-10-CM

## 2020-11-03 DIAGNOSIS — Z71.89 ACP (ADVANCE CARE PLANNING): ICD-10-CM

## 2020-11-03 DIAGNOSIS — M79.641 BILATERAL HAND PAIN: ICD-10-CM

## 2020-11-03 PROCEDURE — 3017F COLORECTAL CA SCREEN DOC REV: CPT | Performed by: FAMILY MEDICINE

## 2020-11-03 PROCEDURE — G0463 HOSPITAL OUTPT CLINIC VISIT: HCPCS | Performed by: FAMILY MEDICINE

## 2020-11-03 PROCEDURE — 2022F DILAT RTA XM EVC RTNOPTHY: CPT | Performed by: FAMILY MEDICINE

## 2020-11-03 PROCEDURE — G8752 SYS BP LESS 140: HCPCS | Performed by: FAMILY MEDICINE

## 2020-11-03 PROCEDURE — G8510 SCR DEP NEG, NO PLAN REQD: HCPCS | Performed by: FAMILY MEDICINE

## 2020-11-03 PROCEDURE — 93010 ELECTROCARDIOGRAM REPORT: CPT | Performed by: FAMILY MEDICINE

## 2020-11-03 PROCEDURE — G8417 CALC BMI ABV UP PARAM F/U: HCPCS | Performed by: FAMILY MEDICINE

## 2020-11-03 PROCEDURE — G8427 DOCREV CUR MEDS BY ELIG CLIN: HCPCS | Performed by: FAMILY MEDICINE

## 2020-11-03 PROCEDURE — 90732 PPSV23 VACC 2 YRS+ SUBQ/IM: CPT

## 2020-11-03 PROCEDURE — 80053 COMPREHEN METABOLIC PANEL: CPT

## 2020-11-03 PROCEDURE — G0439 PPPS, SUBSEQ VISIT: HCPCS | Performed by: FAMILY MEDICINE

## 2020-11-03 PROCEDURE — 1101F PT FALLS ASSESS-DOCD LE1/YR: CPT | Performed by: FAMILY MEDICINE

## 2020-11-03 PROCEDURE — 36415 COLL VENOUS BLD VENIPUNCTURE: CPT

## 2020-11-03 PROCEDURE — 85027 COMPLETE CBC AUTOMATED: CPT

## 2020-11-03 PROCEDURE — G9899 SCRN MAM PERF RSLTS DOC: HCPCS | Performed by: FAMILY MEDICINE

## 2020-11-03 PROCEDURE — 82306 VITAMIN D 25 HYDROXY: CPT

## 2020-11-03 PROCEDURE — G8399 PT W/DXA RESULTS DOCUMENT: HCPCS | Performed by: FAMILY MEDICINE

## 2020-11-03 PROCEDURE — 80061 LIPID PANEL: CPT

## 2020-11-03 PROCEDURE — 93005 ELECTROCARDIOGRAM TRACING: CPT | Performed by: FAMILY MEDICINE

## 2020-11-03 PROCEDURE — G8754 DIAS BP LESS 90: HCPCS | Performed by: FAMILY MEDICINE

## 2020-11-03 PROCEDURE — 99214 OFFICE O/P EST MOD 30 MIN: CPT | Performed by: FAMILY MEDICINE

## 2020-11-03 PROCEDURE — 3046F HEMOGLOBIN A1C LEVEL >9.0%: CPT | Performed by: FAMILY MEDICINE

## 2020-11-03 PROCEDURE — G8536 NO DOC ELDER MAL SCRN: HCPCS | Performed by: FAMILY MEDICINE

## 2020-11-03 PROCEDURE — 1090F PRES/ABSN URINE INCON ASSESS: CPT | Performed by: FAMILY MEDICINE

## 2020-11-03 NOTE — PROGRESS NOTES
HISTORY OF PRESENT ILLNESS  HPI  Wilfredo Buckner a 72 y.o. Female with history of HTN, DM-II, hyperlipidemia with LDL goal < 100, and vitamin D deficiency who presents to office today for these and a f/u for an annual wellness visit. Labs from her endocrinologist include:Her GFR is 62. Her LDL is 80. She has a total cholesterol of 152. Her HDL is 54. There is no protein in the urine. Her A1C was 8.1 in November 2019, 7.7 in February of 2020, and 9.0 in February of 2020. Her CMP is otherwise normal except for very chronically elevated alk phos. She says that she f/u with her endocrinologist, Dr. Blue Saavedra, every 3 months. She notes that her endocrinologist has not checked the pt's feet. Pt states that she went to [facility] today to get her lab work done , including her vitamin D checked. Pt notes that she takes 2000 units of vitamin D. She estimates that for the past 6 months, she has been taking her vitamin D infrequently, perhaps \"10% of the time\". She remarks that she had some bilateral hand discomfort- aches and seems to sometimes burn, but no real tingling. . Pt affirms that this does not bother her in the middle of the night; she thinks it affects mainly the 1st three digits mostly but thinks it affects all of the finger some. Pt denies unusual SOB, chest pain, and any recent ER visits or hospitalizations.          Past Medical History:   Diagnosis Date    Adverse effect of anesthesia     one month after some surgeries had swelling of one lip/tongue/side of face x 1-2 months    Allergic rhinitis     Cancer ([address]     cancer cells in uterus - surgery    DDD (degenerative disc disease)     thoracic and cervical spine    Diverticulosis     hx diverticulosis    Dyslipidemia, goal LDL below 100 9/29/2017    Eczema     Glaucoma     glaucoma    Hiatal hernia     hiatal hernia    Hyperlipidemia     Hypertension     Ill-defined condition     bulging disc/pinched nerve low back Having surgery CJW 1/22/19    Ill-defined condition     overweight    Lichen sclerosus     dx at [facility]    ANMOL (obstructive sleep apnea)     CPAP - does not use regularly    Reflux esophagitis     Situational anxiety 9/29/2017    Stomach ulcer     Type II or unspecified type diabetes mellitus without mention of complication, not stated as uncontrolled      Past Surgical History:   Procedure Laterality Date    COLONOSCOPY N/A 2/27/2018    COL[facility] performed [facility] Harish Lee [facility] at [facility] ENDOSCOPY    HX BACK SURGERY      x 3 - total of 3 back surgeries as of 2/26/2018/5 back surgeries as of 6/17/2020    HX COLONOSCOPY  2013    HX DILATION AND CURETTAGE      x 3 for abnormal bleeding    HX GYN      tumors removed from uterus - had cancer cells    HX HEENT  2002    sinus surgery for snoring & partial Uvelectomy    HX HIP REPLACEMENT  2004    rt    HX ORTHOPAEDIC Left 07/2013    hip replacement    HX TONSIL AND ADENOIDECTOMY      pt does not think her adenoids were removed    HX TUBAL LIGATION      PALATOPHAYNGOPLASTY  2002    OR DCMPRN PERQ NUCLEUS PULPOSUS 1/> LEVELS LUMBAR  2003    L3-4    TOTAL ABDOM HYSTERECTOMY       Current Outpatient Medications on File Prior to Visit   Medication Sig Dispense Refill    semaglutide (Ozempic) 0.25 mg/0.2 mL (2 mg/1.5 mL) sub-q pen by SubCUTAneous route every seven (7) days.  atorvastatin (LIPITOR) 40 mg tablet Take 1 Tab by mouth daily. 90 Tab 0    mirabegron (MYRBETRIQ PO) Take  by mouth.  insulin aspart (NOVOLOG FLEXPEN U-100 INSULIN SC) 16-20 Units by SubCUTAneous route daily (with dinner).  coenzyme Q-10 (Co Q-10) 200 mg capsule Take 200 mg by mouth daily.  calcium carbonate (TUMS PO) Take 2 Tabs by mouth two (2) times daily as needed.  amLODIPine (NORVASC) 5 mg tablet take 1 tablet by mouth once daily 90 Tab 1    latanoprost (XALATAN) 0.005 % ophthalmic solution Administer 1 Drop to both eyes nightly.   0    CHOLECALCIFEROL, VITAMIN D3, (VITAMIN D3 PO) Take 2,000 Units by mouth daily.  insulin glargine (TOUJEO SOLOSTAR U-300 INSULIN) 300 unit/mL (1.5 mL) inpn 64 Units by SubCUTAneous route nightly.  polyethylene glycol (MIRALAX) 17 gram/dose powder Take 17 g by mouth nightly as needed.  BD INSULIN PEN NEEDLE UF MINI 31 gauge x 3/16\" ndle use four times a day for INSULIN INJECTIONS as directed  1    insulin aspart (NOVOLOG FLEXPEN) 100 unit/mL inpn 14-20 Units by SubCUTAneous route two (2) times a day. Breakfast and lunch.  ONETOUCH ULTRA TEST strip   0    halobetasol (ULTRAVATE) 0.05 % ointment Apply  to affected area as needed. 0    metFORMIN (GLUCOPHAGE) 500 mg tablet Take 1,000 mg by mouth two (2) times daily (with meals).  [DISCONTINUED] furosemide (LASIX) 40 mg tablet Take 40 mg by mouth daily as needed. No current facility-administered medications on file prior to visit. Allergies   Allergen Reactions    Aspirin Nausea and Vomiting     Can take a coated aspirin.  Benicar [Olmesartan] Swelling     Pt states she is not allergic to this.  Ciprocinonide Swelling     Pt. States not allergic to this med.  Ciprofloxacin Swelling     Pt states she is not allergic to this.  Crestor [Rosuvastatin] Myalgia     Pt. Denies allergy .  Famotidine Itching     All other.  Sulfa (Sulfonamide Antibiotics) Hives     Pt.  Denies allergy     Family History   Problem Relation Age of Onset    Cancer Maternal Grandfather         lung    Cancer Paternal Grandfather         colon    Heart Attack Mother     Dementia Father         alzheimers    Heart Disease Father     Kidney Disease Father     Dementia Maternal Grandmother         alzheimers    Breast Cancer Paternal Aunt     Other Sister         hysterectomy    Other Brother         shingles    Stroke Sister         mini    Other Nephew         balance disorder     Social History     Socioeconomic History    Marital status: SINGLE     Spouse name: Not on file    Number of children: Not on file    Years of education: Not on file    Highest education level: Not on file   Tobacco Use    Smoking status: Never Smoker    Smokeless tobacco: Never Used   Substance and Sexual Activity    Alcohol use: Yes     Frequency: Monthly or less     Drinks per session: 1 or 2     Binge frequency: Never     Comment: occasional    Drug use: No    Sexual activity: Not Currently   Other Topics Concern     Service No    Blood Transfusions No    Caffeine Concern No    Occupational Exposure No    Hobby Hazards No    Sleep Concern Yes    Stress Concern Yes    Weight Concern Yes    Special Diet No    Back Care Yes     Comment: 3 back surgeries    Exercise No    Seat Belt Yes    Self-Exams Yes   Social History Narrative    Lives at home with brother. Review of Systems   Constitutional: Negative for chills, diaphoresis, fever, malaise/fatigue and weight loss. Eyes: Negative for blurred vision, double vision, pain and redness. Respiratory: Negative for cough, shortness of breath and wheezing. Cardiovascular: Negative for chest pain, palpitations, orthopnea, claudication, leg swelling and PND. Genitourinary: Negative for frequency. Skin: Negative for itching and rash. Neurological: Negative for dizziness, tingling, tremors, sensory change, speech change, focal weakness, seizures, loss of consciousness, weakness and headaches. Endo/Heme/Allergies: Negative for environmental allergies and polydipsia. Does not bruise/bleed easily.      Results for orders placed or performed in visit on 10/23/20   LIPID PANEL   Result Value Ref Range    Cholesterol, total 170 100 - 199 mg/dL    Triglyceride 68 0 - 149 mg/dL    HDL Cholesterol 53 >39 mg/dL    VLDL Cholesterol Floyd 13 5 - 40 mg/dL    LDL Chol Calc (NIH) 104 (H) 0 - 99 mg/dL   METABOLIC PANEL, COMPREHENSIVE   Result Value Ref Range    Glucose 82 65 - 99 mg/dL    BUN 13 8 - 27 mg/dL Creatinine 1.03 (H) 0.57 - 1.00 mg/dL    GFR est non-AA 56 (L) >59 mL/min/1.73    GFR est AA 64 >59 mL/min/1.73    BUN/Creatinine ratio 13 12 - 28    Sodium 141 134 - 144 mmol/L    Potassium 4.0 3.5 - 5.2 mmol/L    Chloride 102 96 - 106 mmol/L    CO2 25 20 - 29 mmol/L    Calcium 9.8 8.7 - 10.3 mg/dL    Protein, total 7.3 6.0 - 8.5 g/dL    Albumin 4.4 3.8 - 4.8 g/dL    GLOBULIN, TOTAL 2.9 1.5 - 4.5 g/dL    A-G Ratio 1.5 1.2 - 2.2    Bilirubin, total 0.4 0.0 - 1.2 mg/dL    Alk. phosphatase 183 (H) 39 - 117 IU/L    AST (SGOT) 15 0 - 40 IU/L    ALT (SGPT) 14 0 - 32 IU/L   CBC W/O DIFF   Result Value Ref Range    WBC 10.1 3.4 - 10.8 x10E3/uL    RBC 4.92 3.77 - 5.28 x10E6/uL    HGB 11.4 11.1 - 15.9 g/dL    HCT 37.2 34.0 - 46.6 %    MCV 76 (L) 79 - 97 fL    MCH 23.2 (L) 26.6 - 33.0 pg    MCHC 30.6 (L) 31.5 - 35.7 g/dL    RDW 15.8 (H) 11.7 - 15.4 %    PLATELET 643 136 - 975 x10E3/uL   VITAMIN D, 25 HYDROXY   Result Value Ref Range    VITAMIN D, 25-HYDROXY 18.2 (L) 30.0 - 100.0 ng/mL   CVD REPORT   Result Value Ref Range    INTERPRETATION Note     PDF IMAGE Not applicable    CKD REPORT   Result Value Ref Range    Interpretation Note            Physical Exam  Visit Vitals  /76 (BP 1 Location: Left arm, BP Patient Position: Sitting)   Pulse 74   Temp 97.4 °F (36.3 °C) (Temporal)   Resp 18   Ht 5' 6\" (1.676 m)   Wt 222 lb 12.8 oz (101.1 kg)   LMP 09/23/2009 Comment: occass[place] spotting   SpO2 98%   BMI 35.96 kg/m²       General:  Alert, cooperative, no distress, appears stated age. Head:  Normocephalic, without obvious abnormality, atraumatic. Eyes:  Conjunctivae/corneas clear. PERRL, EOMs intact. Fundi benign. Ears:  Normal TMs and external ear canals both ears. Nose: Nares normal. Septum midline. Mucosa normal. No drainage or sinus tenderness.    Throat: Lips, mucosa, and tongue normal. Teeth and gums normal.   Neck: Supple, symmetrical, trachea midline, no adenopathy, thyroid: no enlargement/tenderness/nodules, no carotid bruit and no JVD. Back:   Symmetric, no curvature. ROM normal. No CVA tenderness. Lungs:   Clear to auscultation bilaterally. Chest wall:  No tenderness or deformity. Heart:  Regular rate and rhythm, S1, S2 normal, no murmur, click, rub or gallop. Abdomen:   Difficult exam due to obesity. Soft, non-tender. Bowel sounds normal. No masses,  No organomegaly. Extremities: Extremities normal, atraumatic, no cyanosis or edema. Pulses: 2+ and symmetric all extremities. Skin: Skin color, texture, turgor normal. No rashes or lesions. Lymph nodes: Cervical, supraclavicular, and axillary nodes normal.   Neurologic: CNII-XII intact. Normal strength, sensation and reflexes throughout. Diabetic foot exam:     Left: Reflexes 2+     Proprioception normal    Filament test normal sensation with micro filament   Pulse DP: 2+ (normal)   Pulse PT: 2+ (normal)   Deformities: None  Right: Reflexes 2+              Proprioception normal   Filament test normal sensation with micro filament   Pulse DP: 2+ (normal)   Pulse PT: 2+ (normal)   Deformities: None             ASSESSMENT and PLAN    ICD-10-CM ICD-9-CM    1. Essential hypertension  I10 401.9    2. Encounter for annual wellness exam in Medicare patient  Z00.00 V70.0 AMB POC EKG ROUTINE W/ 12 LEADS, INTER & REP      PNEUMOCOCCAL POLYSACCHARIDE VACCINE, 23-VALENT, ADULT OR IMMUNOSUPPRESSED PT DOSE,   3. Encounter for immunization  Z23 V03.89 PNEUMOCOCCAL POLYSACCHARIDE VACCINE, 23-VALENT, ADULT OR IMMUNOSUPPRESSED PT DOSE,      [place] IMMUNIZ ADMIN,1 SINGLE/COMB VAC/TOXOID   4. Vitamin D deficiency  E55.9 268.9    5. Dyslipidemia, goal LDL below 100  E78.5 272.4    6. Gastroesophageal reflux disease without esophagitis  K21.9 530.81    7. Urinary urgency  R39.15 788.63    8. Familial hypercholesterolemia  E78.01 272.0    9. Uncontrolled type 2 diabetes mellitus with insulin therapy ([facility] [address]  E11.65 250.02     Z79.4 V58.67    10.  Type 2 diabetes mellitus with nephropathy (HCC)  E11.21 250.40  DIABETES FOOT EXAM     583.81    11. Situational anxiety  F41.8 300.09    12. Severe obesity with body mass index (BMI) of 35.0 to 39.9 with serious comorbidity (HCC)  E66.01 278.01    13. Gastroesophageal reflux disease with esophagitis without hemorrhage  K21.00 530.81      530.10    14. ANMOL (obstructive sleep apnea)  G47.33 327.23    15. Allergic rhinitis due to pollen, unspecified seasonality  J30.1 477.0    16. Cervical spondylosis without myelopathy  M47.812 721.0    17. ACP (advance care planning)  Z71.89 V65.49    18. Medicare annual wellness visit, subsequent  Z00.00 V70.0    19. Bilateral hand pain  M79.641 729.5     M79.642      ? carpal tunnel- 11/2020 OV     Diagnoses and all orders for this visit:    1. Essential hypertension    2. Encounter for annual wellness exam in Medicare patient  -     AMB POC EKG ROUTINE W/ 12 LEADS, INTER & REP  -     PNEUMOCOCCAL POLYSACCHARIDE VACCINE, 23-VALENT, ADULT OR IMMUNOSUPPRESSED PT DOSE,    3. Encounter for immunization  -     PNEUMOCOCCAL POLYSACCHARIDE VACCINE, 23-VALENT, ADULT OR IMMUNOSUPPRESSED PT DOSE,  -     [place] IMMUNIZ ADMIN,1 SINGLE/COMB VAC/TOXOID    4. Vitamin D deficiency    5. Dyslipidemia, goal LDL below 100    6. Gastroesophageal reflux disease without esophagitis    7. Urinary urgency    8. Familial hypercholesterolemia    9. Uncontrolled type 2 diabetes mellitus with insulin therapy ([facility] [address]    10. Type 2 diabetes mellitus with nephropathy (HCC)  -      DIABETES FOOT EXAM    11. Situational anxiety    12. Severe obesity with body mass index (BMI) of 35.0 to 39.9 with serious comorbidity ([address]    13. Gastroesophageal reflux disease with esophagitis without hemorrhage    14. ANMOL (obstructive sleep apnea)    15. Allergic rhinitis due to pollen, unspecified seasonality    16. Cervical spondylosis without myelopathy    17. ACP (advance care planning)    18. Medicare annual wellness visit, subsequent    19.  Bilateral hand pain  Comments:  ? carpal tunnel- 11/2020 OV              lab results and schedule of future lab studies reviewed with patient  reviewed diet, exercise and weight control  cardiovascular risk and specific lipid/LDL goals reviewed  reviewed medications and side effects in detail  Please call my office if there are any questions- [phone]. I will arrange for follow up after the lab tests done from today return  Recommended a weekly \"heart check. \" I went into detail how to do this. Call for refills on medications as needed. Discussed expected course/resolution/complications of diagnosis in detail with patient. Medication risks/benefits/costs/interactions/alternatives discussed with patient. Pt was given an after visit summary which includes diagnoses, current medications & vitals. Pt expressed understanding with the diagnosis and plan      BMI is significantly elevated- in the obese range. I reviewed diet, exercise and weight control. Discussed weight control in detail, the importance of mainly decreased carbs, and for weight maintenance, exercise; discussed different diets and that it isn't as important to watch the type of foods as it is to decrease calorie intake no matter what type of diet you do, etc.       Total 25 minutes,60 % counseling re: Discussed specific diabetic recommendations: low cholesterol diet, weight control and daily exercise discussed, home glucose monitoring emphasized, all medications, side effects and compliance discussed carefully, foot care discussed and Podiatry visits discussed, annual eye examinations at Ophthalmology discussed, glycohemoglobin and other lab monitoring discussed and long term diabetic complications discussed. Reviewed BP, cholesterol and diabetic goals. LDL goal<100,HDL goal>45, Triglyceride goal<150, A1C< 7.0, BP<140/90.      Review of  the proper technique of checking the blood pressure- check it on an average day only, not on a stressful day, sitting, no exercise for at least 1 hour and not experiencing any new pain( chronic pain is OK). Patient encouraged to check BP sitting and standing at least once a month and to report these readings to me if > 140/ 90 on average , or if the standing BP is >  15 points lower than the sitting. Always check it twice and if there is > 5 points decrease from the previous reading( top reading or systolic) keep checking it until it does not drop 5 points. Write only this final reading down, not the preceding readings. If out of these readings there is only 1 out of 4 or less > 145, or > 90 diastolic then your blood pressure is OK; it needs further treatment if it is above this. Also, don't forget,  as noted above, to check your blood pressure standing once a month; this is to detect a drop in your BP that might lead to fainting and serious injury; you check it standing with your arm hanging straight down and relaxed. Check it twice waiting 1 minute between the two readings. If, with either one of these 2 readings there is a > 15 point drop of the systolic compared to your sitting pressure( done before the standing BP), then let me know. Following these guidelines, continue to check your BP and write down only the ones described above and it will help me to effectively treat your blood pressure. Reviewed symptoms, or lack thereof, of hypertension and elevated cholesterol. Regular exercise is very important to your health; it helps mentally, physically, socially; it prevents injuries if done properly. Exercise, even as simple as walking 20-30 minutes daily has major benefits to your health even though your \"numbers\" are the same in the lab. See if you can add this into your daily regimen and after a few months it will become a regular habit-\"just something you do,\" like brushing your teeth.      A combination of aerobic exercise and strengthening and stretching is felt to be the best for you, so this should be your ultimate goal.   This can be done in the privacy of your home or in a group setting as at the gym  Some prefer having a , others prefer to do exercise in groups or individually. Do what \"works\" for you. You need to make it simple and \"fun,\" or you most likely will not continue it. Recommended a weekly \"heart check. \" I went into detail how to do this. Also, discussed symptoms of concern that were noted today in the note above, treatment options( including doing nothing), when to follow up before recommended time frame. Also, answered all questions. I congratulated her on her weight loss and encouraged her to continue with that. We updated her pneumonia with a Pneumovax. I reminded her to do heart check on a weekly basis. She does her lab work through her endocrinologist, Dr. Clifford Cardenas, and a copy is in the chart. Her vitamin D is done separately. This document was written by Darin Orellana, as dictated by Leopold Oram, MD.  This is the Subsequent Medicare Annual Wellness Exam, performed 12 months or more after the Initial AWV or the last Subsequent AWV    I have reviewed the patient's medical history in detail and updated the computerized patient record.      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    Reflux esophagitis K21.00    Degeneration of intervertebral disc, site unspecified [id number]    Cervical spondylosis without myelopathy M47.812    Contact dermatitis and other eczema, due to unspecified cause L25.9    Allergic rhinitis J30.9    Vitamin D deficiency N49.1    Lichen sclerosus V01.4    Hypertension I10    ACP (advance care planning) Z71.89    Type 2 diabetes mellitus without complication, with long-term current use of insulin (HCC) E11.9, Z79.4    Situational anxiety F41.8    Dyslipidemia, goal LDL below 100 E78.5    Type 2 diabetes mellitus with nephropathy ([facility] [address] E11.21    Severe obesity with body mass index (BMI) of 35.0 to 39.9 with serious comorbidity (HCC) E66.01    Colon adenoma D12.6    Postmenopausal bleeding N95.0    History of glaucoma Z86.69    Gastroesophageal reflux disease without esophagitis K21.9     Past Medical History:   Diagnosis Date    Adverse effect of anesthesia     one month after some surgeries had swelling of one lip/tongue/side of face x 1-2 months    Allergic rhinitis     Cancer ([facility] [address]     cancer cells in uterus - surgery    DDD (degenerative disc disease)     thoracic and cervical spine    Diverticulosis     hx diverticulosis    Dyslipidemia, goal LDL below 100 9/29/2017    Eczema     Glaucoma     glaucoma    Hiatal hernia     hiatal hernia    Hyperlipidemia     Hypertension     Ill-defined condition     bulging disc/pinched nerve low back Having surgery CJW 1/22/19    Ill-defined condition     overweight    Lichen sclerosus     [facility] at [facility]    ANMOL (obstructive sleep apnea)     CPAP - does not use regularly    Reflux esophagitis     Situational anxiety 9/29/2017    Stomach ulcer     Type II or unspecified type diabetes mellitus without mention of complication, not stated as uncontrolled       Past Surgical History:   Procedure Laterality Date    COLONOSCOPY N/A 2/27/2018    COL[facility] performed by Maeev [facility] at [facility] ENDOSCOPY    HX BACK SURGERY      x 3 - total of 3 back surgeries as of 2/26/2018/5 back surgeries as of 6/17/2020    HX COLONOSCOPY  2013    HX DILATION AND CURETTAGE      x 3 for abnormal bleeding    HX GYN      tumors removed from uterus - had cancer cells    HX HEENT  2002    sinus surgery for snoring & partial Uvelectomy    HX HIP REPLACEMENT  2004    rt    HX ORTHOPAEDIC Left 07/2013    hip replacement    HX TONSIL AND ADENOIDECTOMY      pt does not think her adenoids were removed    HX TUBAL LIGATION      PALATOPHAYNGOPLASTY  2002    MD DCMPRN PERQ NUCLEUS PULPOSUS 1/> LEVELS LUMBAR  2003    L3-4    TOTAL ABDOM HYSTERECTOMY       Current Outpatient Medications   Medication Sig Dispense Refill    semaglutide (Ozempic) 0.25 mg/0.2 mL (2 mg/1.5 mL) sub-q pen by SubCUTAneous route every seven (7) days.  atorvastatin (LIPITOR) 40 mg tablet Take 1 Tab by mouth daily. 90 Tab 0    mirabegron (MYRBETRIQ PO) Take  by mouth.  insulin aspart (NOVOLOG FLEXPEN U-100 INSULIN SC) 16-20 Units by SubCUTAneous route daily (with dinner).  coenzyme Q-10 (Co Q-10) 200 mg capsule Take 200 mg by mouth daily.  calcium carbonate (TUMS PO) Take 2 Tabs by mouth two (2) times daily as needed.  amLODIPine (NORVASC) 5 mg tablet take 1 tablet by mouth once daily 90 Tab 1    latanoprost (XALATAN) 0.005 % ophthalmic solution Administer 1 Drop to both eyes nightly. 0    CHOLECALCIFEROL, VITAMIN D3, (VITAMIN D3 PO) Take 2,000 Units by mouth daily.  insulin glargine (TOUJEO SOLOSTAR U-300 INSULIN) 300 unit/mL (1.5 mL) inpn 64 Units by SubCUTAneous route nightly.  polyethylene glycol (MIRALAX) 17 gram/dose powder Take 17 g by mouth nightly as needed.  BD INSULIN PEN NEEDLE UF MINI 31 gauge x 3/16\" ndle use four times a day for INSULIN INJECTIONS as directed  1    insulin aspart (NOVOLOG FLEXPEN) 100 unit/mL inpn 14-20 Units by SubCUTAneous route two (2) times a day. Breakfast and lunch.  ONETOUCH ULTRA TEST strip   0    halobetasol (ULTRAVATE) 0.05 % ointment Apply  to affected area as needed. 0    metFORMIN (GLUCOPHAGE) 500 mg tablet Take 1,000 mg by mouth two (2) times daily (with meals). Allergies   Allergen Reactions    Aspirin Nausea and Vomiting     Can take a coated aspirin.  Benicar [Olmesartan] Swelling     Pt states she is not allergic to this.  Ciprocinonide Swelling     Pt. States not allergic to this med.     Ciprofloxacin Swelling     Pt states she is not allergic to this.    Crestor [Rosuvastatin] Myalgia     Pt. Denies allergy .  Famotidine Itching     All other.  Sulfa (Sulfonamide Antibiotics) Hives     Pt. Denies allergy       Family History   Problem Relation Age of Onset    Cancer Maternal Grandfather         lung    Cancer Paternal Grandfather         colon    Heart Attack Mother     Dementia Father         alzheimers    Heart Disease Father     Kidney Disease Father     Dementia Maternal Grandmother         alzheimers    Breast Cancer Paternal [de-identified]     Other Sister         hysterectomy    Other Brother         shingles    Stroke Sister         mini    Other Nephew         balance disorder     Social History     Tobacco Use    Smoking status: Never Smoker    Smokeless tobacco: Never Used   Substance Use Topics    Alcohol use: Yes     Frequency: Monthly or less     Drinks per session: 1 or 2     Binge frequency: Never     Comment: occasional       Depression Risk Factor Screening:     3 most recent PHQ Screens 11/3/2020   Little interest or pleasure in doing things Not at all   Feeling down, depressed, irritable, or hopeless Not at all   Total Score PHQ 2 0       Alcohol Risk Screen   Do you average more than 1 drink per night or more than 7 drinks a week:  No    On any one occasion in the past three months have you have had more than 3 drinks containing alcohol:  No        Functional Ability and Level of Safety:   Hearing: Hearing is good. Activities of Daily Living: The home contains: no safety equipment. Patient does total self care     Ambulation: with no difficulty     Fall Risk:  Fall Risk Assessment, last 12 mths 11/3/2020   Able to walk? Yes   Fall in past 12 months?  No   Fall with injury? -   Number of falls in past 12 months -   Fall Risk Score -     Abuse Screen:  Patient is not abused       Cognitive Screening   Has your family/caregiver stated any concerns about your memory: no     Cognitive Screening: Normal - Mini Cog Test  I reviewed advanced directive information and written information given to patient; patient to make follow up appointment with a NN for any questions,to review choices made for health care, agent, and anatomical gifts that are on the advanced directive at home. Patient Care Team   Patient Care Team:  Willy Levy MD as PCP - General  Willy Levy MD as PCP - [facility] Empaneled Provider  Tra Becker MD (Endocrinology)    Assessment/Plan   Education and counseling provided:  Are appropriate based on today's review and evaluation    Diagnoses and all orders for this visit:    1. Essential hypertension    2. Encounter for annual wellness exam in Medicare patient  -     AMB POC EKG ROUTINE W/ 12 LEADS, INTER & REP  -     PNEUMOCOCCAL POLYSACCHARIDE VACCINE, 23-VALENT, ADULT OR IMMUNOSUPPRESSED PT DOSE,    3. Encounter for immunization  -     PNEUMOCOCCAL POLYSACCHARIDE VACCINE, 23-VALENT, ADULT OR IMMUNOSUPPRESSED PT DOSE,  -     VA IMMUNIZ ADMIN,1 SINGLE/COMB VAC/TOXOID    4. Vitamin D deficiency    5. Dyslipidemia, goal LDL below 100    6. Gastroesophageal reflux disease without esophagitis    7. Urinary urgency    8. Familial hypercholesterolemia    9. Uncontrolled type 2 diabetes mellitus with insulin therapy ([facility] [address]    10. Type 2 diabetes mellitus with nephropathy (HCC)  -      DIABETES FOOT EXAM    11. Situational anxiety    12. Severe obesity with body mass index (BMI) of 35.0 to 39.9 with serious comorbidity ([address]    13. Gastroesophageal reflux disease with esophagitis without hemorrhage    14. ANMOL (obstructive sleep apnea)    15. Allergic rhinitis due to pollen, unspecified seasonality    16. Cervical spondylosis without myelopathy    17. ACP (advance care planning)    18. Medicare annual wellness visit, subsequent    19.  Bilateral hand pain  Comments:  ? carpal tunnel- 11/2020 OV        Health Maintenance Due   Topic Date Due    Breast Cancer Screen Mammogram  10/15/2020

## 2020-11-03 NOTE — PROGRESS NOTES
Chief Complaint   Patient presents with   [facility] Annual Wellness Visit     Eligible for      1. Have you been to the ER, urgent care clinic since your last visit? Hospitalized since your last visit? No    2. Have you seen or consulted any other health care providers outside of the [address] [place] [address] since your last visit? Include any pap smears or colon screening. No    Patient received Pneumococcal vaccine today in right deltoid with no adverse reactions noted. Patient tolerated vaccine well.

## 2020-11-04 LAB
25(OH)D3+25(OH)D2 SERPL-MCNC: 18.2 NG/ML (ref 30–100)
ALBUMIN SERPL-MCNC: 4.4 G/DL (ref 3.8–4.8)
ALBUMIN/GLOB SERPL: 1.5 {RATIO} (ref 1.2–2.2)
ALP SERPL-CCNC: 183 IU/L (ref 39–117)
ALT SERPL-CCNC: 14 IU/L (ref 0–32)
AST SERPL-CCNC: 15 IU/L (ref 0–40)
BILIRUB SERPL-MCNC: 0.4 MG/DL (ref 0–1.2)
BUN SERPL-MCNC: 13 MG/DL (ref 8–27)
BUN/CREAT SERPL: 13 (ref 12–28)
CALCIUM SERPL-MCNC: 9.8 MG/DL (ref 8.7–10.3)
CHLORIDE SERPL-SCNC: 102 MMOL/L (ref 96–106)
CHOLEST SERPL-MCNC: 170 MG/DL (ref 100–199)
CO2 SERPL-SCNC: 25 MMOL/L (ref 20–29)
CREAT SERPL-MCNC: 1.03 MG/DL (ref 0.57–1)
ERYTHROCYTE [DISTWIDTH] IN BLOOD BY AUTOMATED COUNT: 15.8 % (ref 11.7–15.4)
GLOBULIN SER CALC-MCNC: 2.9 G/DL (ref 1.5–4.5)
GLUCOSE SERPL-MCNC: 82 MG/DL (ref 65–99)
HCT VFR BLD AUTO: 37.2 % (ref 34–46.6)
HDLC SERPL-MCNC: 53 MG/DL
HGB BLD-MCNC: 11.4 G/DL (ref 11.1–15.9)
INTERPRETATION, 910389: NORMAL
INTERPRETATION: NORMAL
LDLC SERPL CALC-MCNC: 104 MG/DL (ref 0–99)
MCH RBC QN AUTO: 23.2 PG (ref 26.6–33)
MCHC RBC AUTO-ENTMCNC: 30.6 G/DL (ref 31.5–35.7)
MCV RBC AUTO: 76 FL (ref 79–97)
PDF IMAGE, 910387: NORMAL
PLATELET # BLD AUTO: 360 X10E3/UL (ref 150–450)
POTASSIUM SERPL-SCNC: 4 MMOL/L (ref 3.5–5.2)
PROT SERPL-MCNC: 7.3 G/DL (ref 6–8.5)
RBC # BLD AUTO: 4.92 X10E6/UL (ref 3.77–5.28)
SODIUM SERPL-SCNC: 141 MMOL/L (ref 134–144)
TRIGL SERPL-MCNC: 68 MG/DL (ref 0–149)
VLDLC SERPL CALC-MCNC: 13 MG/DL (ref 5–40)
WBC # BLD AUTO: 10.1 X10E3/UL (ref 3.4–10.8)

## 2020-11-06 LAB — HBA1C MFR BLD HPLC: 7.8 %

## 2020-11-08 PROBLEM — Z86.69 HISTORY OF GLAUCOMA: Status: ACTIVE | Noted: 2020-11-08

## 2020-11-08 PROBLEM — K21.9 GASTROESOPHAGEAL REFLUX DISEASE WITHOUT ESOPHAGITIS: Status: ACTIVE | Noted: 2020-11-08

## 2020-11-08 NOTE — PATIENT INSTRUCTIONS
Medicare Wellness Visit, Female The best way to live healthy is to have a lifestyle where you eat a well-balanced diet, exercise regularly, limit alcohol use, and quit all forms of tobacco/nicotine, if applicable. Regular preventive services are another way to keep healthy. Preventive services (vaccines, screening tests, monitoring & exams) can help personalize your care plan, which helps you manage your own care. Screening tests can find health problems at the earliest stages, when they are easiest to treat. [facility] follows the current, evidence-based guidelines published by the [facility] Durga Adrian ([facility]) when recommending preventive services for our patients. Because we follow these guidelines, sometimes recommendations change over time as research supports it. (For example, mammograms used to be recommended annually. Even though Medicare will still pay for an annual mammogram, the newer guidelines recommend a mammogram every two years for women of average risk). Of course, you and your doctor may decide to screen more often for some diseases, based on your risk and your co-morbidities (chronic disease you are already diagnosed with). Preventive services for you include: - Medicare offers their members a free annual wellness visit, which is time for you and your primary care provider to discuss and plan for your preventive service needs. Take advantage of this benefit every year! 
-All adults over the age of 72 should receive the recommended pneumonia vaccines. Current USPSTF guidelines recommend a series of two vaccines for the best pneumonia protection.  
-All adults should have a flu vaccine yearly and a tetanus vaccine every 10 years.  
-All adults age 48 and older should receive the shingles vaccines (series of two vaccines). -All adults age 38-68 who are overweight should have a diabetes screening test once every three years. -All adults born between [birth year] and 1964 should be screened once for Hepatitis C. 
-Other screening tests and preventive services for persons with diabetes include: an eye exam to screen for diabetic retinopathy, a kidney function test, a foot exam, and stricter control over your cholesterol.  
-Cardiovascular screening for adults with routine risk involves an electrocardiogram (ECG) at intervals determined by your doctor.  
-Colorectal cancer screenings should be done for adults age 54-65 with no increased risk factors for colorectal cancer. There are a number of acceptable methods of screening for this type of cancer. Each test has its own benefits and drawbacks. Discuss with your doctor what is most appropriate for you during your annual wellness visit. The different tests include: colonoscopy (considered the best screening method), a fecal occult blood test, a fecal DNA test, and sigmoidoscopy. 
 
-A bone mass density test is recommended when a woman turns 65 to screen for osteoporosis. This test is only recommended one time, as a screening. Some providers will use this same test as a disease monitoring tool if you already have osteoporosis. -Breast cancer screenings are recommended every other year for women of normal risk, age 54-69. 
-Cervical cancer screenings for women over age 72 are only recommended with certain risk factors. Here is a list of your current Health Maintenance items (your personalized list of preventive services) with a due date: 
Health Maintenance Due Topic Date Due  Mammogram  10/15/2020

## 2021-01-19 RX ORDER — AMLODIPINE BESYLATE 5 MG/1
TABLET ORAL
Qty: 90 TAB | Refills: 1 | Status: SHIPPED | OUTPATIENT
Start: 2021-01-19 | End: 2021-10-04

## 2021-01-29 DIAGNOSIS — E78.01 FAMILIAL HYPERCHOLESTEROLEMIA: ICD-10-CM

## 2021-01-29 NOTE — TELEPHONE ENCOUNTER
Pharmacy calling on refill    . Requested Prescriptions     Pending Prescriptions Disp Refills    atorvastatin (LIPITOR) 40 mg tablet 90 Tab 0     Sig: Take 1 Tab by mouth daily.        Best call back #[phone]

## 2021-02-01 RX ORDER — ATORVASTATIN CALCIUM 40 MG/1
40 TABLET, FILM COATED ORAL DAILY
Qty: 90 TAB | Refills: 0 | Status: SHIPPED | OUTPATIENT
Start: 2021-02-01 | End: 2021-06-01

## 2021-02-01 NOTE — TELEPHONE ENCOUNTER
Last visit 11/30/2020 MD Isac Singh   Next appointment Nothing scheduled   Previous refill encounter(s)   10/15/2020 Lipitor #[id number]     Requested Prescriptions     Pending Prescriptions Disp Refills    atorvastatin (LIPITOR) 40 mg tablet 90 Tab 1     Sig: Take 1 Tab by mouth daily.

## 2021-05-27 DIAGNOSIS — E78.01 FAMILIAL HYPERCHOLESTEROLEMIA: ICD-10-CM

## 2021-06-01 RX ORDER — ATORVASTATIN CALCIUM 40 MG/1
TABLET, FILM COATED ORAL
Qty: 90 TABLET | Refills: 0 | Status: SHIPPED | OUTPATIENT
Start: 2021-06-01 | End: 2021-12-21 | Stop reason: SDUPTHER

## 2021-06-18 ENCOUNTER — OFFICE VISIT (OUTPATIENT)
Dept: FAMILY MEDICINE CLINIC | Age: 71
End: 2021-06-18
Payer: MEDICARE

## 2021-06-18 VITALS
HEART RATE: 72 BPM | SYSTOLIC BLOOD PRESSURE: 122 MMHG | RESPIRATION RATE: 16 BRPM | BODY MASS INDEX: 34.39 KG/M2 | OXYGEN SATURATION: 98 % | DIASTOLIC BLOOD PRESSURE: 72 MMHG | TEMPERATURE: 98.2 F | HEIGHT: 66 IN | WEIGHT: 214 LBS

## 2021-06-18 DIAGNOSIS — E55.9 VITAMIN D DEFICIENCY: ICD-10-CM

## 2021-06-18 DIAGNOSIS — E11.21 TYPE 2 DIABETES MELLITUS WITH NEPHROPATHY (HCC): ICD-10-CM

## 2021-06-18 DIAGNOSIS — Z79.4 TYPE 2 DIABETES MELLITUS WITHOUT COMPLICATION, WITH LONG-TERM CURRENT USE OF INSULIN (HCC): Primary | ICD-10-CM

## 2021-06-18 DIAGNOSIS — K59.01 CONSTIPATION BY DELAYED COLONIC TRANSIT: ICD-10-CM

## 2021-06-18 DIAGNOSIS — E11.9 TYPE 2 DIABETES MELLITUS WITHOUT COMPLICATION, WITH LONG-TERM CURRENT USE OF INSULIN (HCC): Primary | ICD-10-CM

## 2021-06-18 DIAGNOSIS — R20.0 BILATERAL HAND NUMBNESS: ICD-10-CM

## 2021-06-18 DIAGNOSIS — F41.8 SITUATIONAL ANXIETY: ICD-10-CM

## 2021-06-18 DIAGNOSIS — E78.5 DYSLIPIDEMIA, GOAL LDL BELOW 100: ICD-10-CM

## 2021-06-18 DIAGNOSIS — I10 ESSENTIAL HYPERTENSION: ICD-10-CM

## 2021-06-18 DIAGNOSIS — K21.9 GASTROESOPHAGEAL REFLUX DISEASE WITHOUT ESOPHAGITIS: ICD-10-CM

## 2021-06-18 PROBLEM — E66.01 SEVERE OBESITY WITH BODY MASS INDEX (BMI) OF 35.0 TO 39.9 WITH SERIOUS COMORBIDITY (HCC): Status: RESOLVED | Noted: 2018-06-08 | Resolved: 2021-06-18

## 2021-06-18 LAB
CREAT UR-MCNC: 136 MG/DL
MICROALBUMIN UR-MCNC: 2.82 MG/DL
MICROALBUMIN/CREAT UR-RTO: 21 MG/G (ref 0–30)

## 2021-06-18 PROCEDURE — G8536 NO DOC ELDER MAL SCRN: HCPCS | Performed by: FAMILY MEDICINE

## 2021-06-18 PROCEDURE — G8427 DOCREV CUR MEDS BY ELIG CLIN: HCPCS | Performed by: FAMILY MEDICINE

## 2021-06-18 PROCEDURE — G8752 SYS BP LESS 140: HCPCS | Performed by: FAMILY MEDICINE

## 2021-06-18 PROCEDURE — G8510 SCR DEP NEG, NO PLAN REQD: HCPCS | Performed by: FAMILY MEDICINE

## 2021-06-18 PROCEDURE — 3017F COLORECTAL CA SCREEN DOC REV: CPT | Performed by: FAMILY MEDICINE

## 2021-06-18 PROCEDURE — 2022F DILAT RTA XM EVC RTNOPTHY: CPT | Performed by: FAMILY MEDICINE

## 2021-06-18 PROCEDURE — 1090F PRES/ABSN URINE INCON ASSESS: CPT | Performed by: FAMILY MEDICINE

## 2021-06-18 PROCEDURE — G8399 PT W/DXA RESULTS DOCUMENT: HCPCS | Performed by: FAMILY MEDICINE

## 2021-06-18 PROCEDURE — G8417 CALC BMI ABV UP PARAM F/U: HCPCS | Performed by: FAMILY MEDICINE

## 2021-06-18 PROCEDURE — G8754 DIAS BP LESS 90: HCPCS | Performed by: FAMILY MEDICINE

## 2021-06-18 PROCEDURE — 3046F HEMOGLOBIN A1C LEVEL >9.0%: CPT | Performed by: FAMILY MEDICINE

## 2021-06-18 PROCEDURE — G0463 HOSPITAL OUTPT CLINIC VISIT: HCPCS | Performed by: FAMILY MEDICINE

## 2021-06-18 PROCEDURE — 1101F PT FALLS ASSESS-DOCD LE1/YR: CPT | Performed by: FAMILY MEDICINE

## 2021-06-18 PROCEDURE — 99215 OFFICE O/P EST HI 40 MIN: CPT | Performed by: FAMILY MEDICINE

## 2021-06-18 NOTE — PROGRESS NOTES
Chief Complaint   Patient presents with    Hypertension    Diabetes    Cholesterol Problem   1. Have you been to the ER, urgent care clinic since your last visit? Hospitalized since your last visit? No    2. Have you seen or consulted any other health care providers outside of the [address] [place] [address] since your last visit? Include any pap smears or colon screening.  Yes Reason for visit: Dr Loly Kilgore

## 2021-06-18 NOTE — PROGRESS NOTES
HISTORY OF PRESENT ILLNESS  HPI  Antoine Adler a 70 y.o. female with history of HTN, DM-II, hyperlipidemia with LDL goal < 100, and vitamin D deficiency who presents to office today for f/u of these health problems. She ate English toast and coffee at 12 pm.    She says that her Ozempic is helping her lose weight but it is making her constipated. Pt tried Anika Hindu' laxative tablets 2 days ago with no relief. Yesterday morning, she tried Duclolax with no relief. She then tried taking a half bottle of magnesium citrate with some relief. Pt started on Ozempic 0.25 mg by Dr. Aurora Collins, her DM specialist, and has not been on 0.05 for past 6 months. He last A1C was under 6. If she takes metformin and insulin, her glucose will drop too low. She previously started on metformin XR, which made her nausea, but she is now tolerating metformin 500 mg. Her glucose this morning was 91 and the day before it was in the 80's. Her lowest glucose reading was in the 60's. Her glucose will usually drop depending on her activity. Pt recognizes when she has low sugar as she will get lightheaded. Pt takes 12-16 units of Novolog around lunch time and 14-20 units of Novolog at supper time. Pt only eats brunch and dinner, usually eating supper before or around 8 pm.    She is not taking vitamin D regularly; she was once again encouraged to place the Vit D in the same area as her prescription medications    Pt is having a lot of gas, both burping and flatus. Pt does consume a lot of milk products. She denies any swelling     She reports her fingertips will get numb when she holds her hands a certain way or if she is holding something. She has all of her lab work including vitamin D done with Dr. Aurora Collins. Unfortunately, we do not have the result of the test done over the past few weeks. Pt denies unusual SOB, chest pain, and any recent ER visits or hospitalizations.        Past Medical History:   Diagnosis Date    Adverse effect of anesthesia     one month after some surgeries had swelling of one lip/tongue/side of face x 1-2 months    Allergic rhinitis     Cancer ([facility] [address]     cancer cells in uterus - surgery    DDD (degenerative disc disease)     thoracic and cervical spine    Diverticulosis     hx diverticulosis    Dyslipidemia, goal LDL below 100 9/29/2017    Eczema     Glaucoma     glaucoma    Hiatal hernia     hiatal hernia    Hyperlipidemia     Hypertension     Ill-defined condition     bulging disc/pinched nerve low back Having surgery [facility] 1/22/19    Ill-defined condition     overweight    Lichen sclerosus     [facility] at [facility]    ANMOL (obstructive sleep apnea)     CPAP - does not use regularly    Reflux esophagitis     Situational anxiety 9/29/2017    Stomach ulcer     Type II or unspecified type diabetes mellitus without mention of complication, not stated as uncontrolled      Past Surgical History:   Procedure Laterality Date    COLONOSCOPY N/A 2/27/2018    COL[facility] performed [facility] at [facility] ENDOSCOPY    HX BACK SURGERY      x 3 - total of 3 back surgeries as of 2/26/2018/5 back surgeries as of 6/17/2020    HX COLONOSCOPY  2013    HX DILATION AND CURETTAGE      x 3 for abnormal bleeding    HX GYN      tumors removed from uterus - had cancer cells    HX HEENT  2002    sinus surgery for snoring & partial Uvelectomy    HX HIP REPLACEMENT  2004    rt    HX ORTHOPAEDIC Left 07/2013    hip replacement    HX TONSIL AND ADENOIDECTOMY      pt does not think her adenoids were removed    HX TUBAL LIGATION      IN DCMPRN PERQ NUCLEUS PULPOSUS 1/> LEVELS LUMBAR  2003    L3-4    IN PALATOPHAYNGOPLASTY  2002    IN TOTAL ABDOM HYSTERECTOMY       Current Outpatient Medications on File Prior to Visit   Medication Sig Dispense Refill    atorvastatin (LIPITOR) 40 mg tablet TAKE 1 TABLET BY MOUTH DAILY 90 Tablet 0    amLODIPine (NORVASC) 5 mg tablet TAKE 1 TABLET BY MOUTH ONCE DAILY 90 Tab 1    semaglutide (Ozempic) 0.25 mg/0.2 mL (2 mg/1.5 mL) sub-q pen by SubCUTAneous route every seven (7) days.  mirabegron (MYRBETRIQ PO) Take  by mouth.  insulin aspart (NOVOLOG FLEXPEN U-100 INSULIN SC) 16-20 Units by SubCUTAneous route daily (with dinner).  coenzyme Q-10 (Co Q-10) 200 mg capsule Take 200 mg by mouth daily.  calcium carbonate (TUMS PO) Take 2 Tabs by mouth two (2) times daily as needed.  latanoprost (XALATAN) 0.005 % ophthalmic solution Administer 1 Drop to both eyes nightly. 0    CHOLECALCIFEROL, VITAMIN D3, (VITAMIN D3 PO) Take 2,000 Units by mouth daily.  insulin glargine (TOUJEO SOLOSTAR U-300 INSULIN) 300 unit/mL (1.5 mL) inpn 64 Units by SubCUTAneous route nightly.  polyethylene glycol (MIRALAX) 17 gram/dose powder Take 17 g by mouth nightly as needed.  halobetasol (ULTRAVATE) 0.05 % ointment Apply  to affected area as needed. 0    metFORMIN (GLUCOPHAGE) 500 mg tablet Take 500 mg by mouth two (2) times daily (with meals).  BD INSULIN PEN NEEDLE UF MINI 31 gauge x 3/16\" ndle use four times a day for INSULIN INJECTIONS as directed  1    ONETOUCH ULTRA TEST strip   0     No current facility-administered medications on file prior to visit. Allergies   Allergen Reactions    Aspirin Nausea and Vomiting     Can take a coated aspirin.  Benicar [Olmesartan] Swelling     Pt states she is not allergic to this.  Ciprocinonide Swelling     Pt. States not allergic to this med.  Ciprofloxacin Swelling     Pt states she is not allergic to this.  Crestor [Rosuvastatin] Myalgia     Pt. Denies allergy .  Famotidine Itching     All other.  Sulfa (Sulfonamide Antibiotics) Hives     Pt.  Denies allergy     Family History   Problem Relation Age of Onset    Cancer Maternal Grandfather         lung    Cancer Paternal Grandfather         colon    Heart Attack Mother     Dementia Father         alzheimers    Heart Disease Father     Kidney Disease Father     Dementia Maternal Grandmother         alzheimers    Breast Cancer Paternal Aunt     Other Sister         hysterectomy    Other Brother         shingles    Stroke Sister         mini    Other Nephew         balance disorder     Social History     Socioeconomic History    Marital status: SINGLE     Spouse name: Not on file    Number of children: Not on file    Years of education: Not on file    Highest education level: Not on file   Tobacco Use    Smoking status: Never Smoker    Smokeless tobacco: Never Used   Vaping Use    Vaping Use: Never used   Substance and Sexual Activity    Alcohol use: Yes     Comment: occasional    Drug use: No    Sexual activity: Not Currently   Other Topics Concern     Service No    Blood Transfusions No    Caffeine Concern No    Occupational Exposure No    Hobby Hazards No    Sleep Concern Yes    Stress Concern Yes    Weight Concern Yes    Special Diet No    Back Care Yes     Comment: 3 back surgeries    Exercise No    Seat Belt Yes    Self-Exams Yes   Social History Narrative    Lives at home with brother. Social Determinants of Health     Financial Resource Strain:     Difficulty of Paying Living Expenses:    Food Insecurity:     Worried About Running Out of Food in the Last Year:     [address] in the Last Year:    Transportation Needs:     Lack of Transportation (Medical):      Lack of Transportation (Non-Medical):    Physical Activity:     Days of Exercise per Week:     Minutes of Exercise per Session:    Stress:     Feeling of Stress :    Social Connections:     Frequency of Communication with Friends and Family:     Frequency of Social Gatherings with Friends and Family:     Attends Confucianism Services:     Active Member of Clubs or Organizations:     Attends Club or Organization Meetings:     Marital Status:              Review of Systems   Constitutional: Negative for chills, diaphoresis, fever, malaise/fatigue and weight loss. Eyes: Negative for blurred vision, double vision, pain and redness. Respiratory: Negative for cough, shortness of breath and wheezing. Cardiovascular: Negative for chest pain, palpitations, orthopnea, claudication, leg swelling and PND. Genitourinary: Negative for frequency. Skin: Negative for itching and rash. Neurological: Positive for sensory change. Negative for dizziness, tingling, tremors, speech change, focal weakness, seizures, loss of consciousness, weakness and headaches. Endo/Heme/Allergies: Negative for environmental allergies and polydipsia. Does not bruise/bleed easily. Results for orders placed or performed in visit on 06/18/21   MICROALBUMIN, UR, RAND W/ MICROALB/CREAT RATIO   Result Value Ref Range    Microalbumin,urine random 2.82 MG/DL    Creatinine, urine 136.00 mg/dL    Microalbumin/Creat ratio (mg/g creat) 21 0 - 30 mg/g           Physical Exam  Visit Vitals  /72   Pulse 72   Temp 98.2 °F (36.8 °C)   Resp 16   Ht 5' 6\" (1.676 m)   Wt 214 lb (97.1 kg)   LMP 09/23/2009 Comment: [place] spotting   SpO2 98%   BMI 34.54 kg/m²       Head: Normocephalic, without obvious abnormality, atraumatic  Eyes: conjunctivae/corneas clear. PERRL, EOM's intact. Neck: supple, symmetrical, trachea midline, no adenopathy, thyroid: not enlarged, symmetric, no tenderness/mass/nodules, no carotid bruit and no JVD  Lungs: clear to auscultation bilaterally  Heart: regular rate and rhythm, S1, S2 normal, no murmur, click, rub or gallop  Extremities: extremities normal, atraumatic, no cyanosis or edema  Pulses: 2+ and symmetric  Lymph nodes: Cervical, supraclavicular, and axillary nodes normal.  Neurologic: Grossly normal        ASSESSMENT and PLAN    ICD-10-CM ICD-9-CM    1.  Type 2 diabetes mellitus without complication, with long-term current use of insulin ([facility])  E11.9 250.00 CANCELED: LIPID PANEL    Z79.4 V58.67 CANCELED: MICROALBUMIN, UR, RAND W/ MICROALB/CREAT RATIO CANCELED: HEMOGLOBIN A1C WITH EAG   2. Essential hypertension  I10 401.9 CANCELED: METABOLIC PANEL, COMPREHENSIVE   3. Dyslipidemia, goal LDL below 100  E78.5 272.4 CANCELED: LIPID PANEL      CANCELED: METABOLIC PANEL, COMPREHENSIVE   4. Type 2 diabetes mellitus with nephropathy (HCC)  E11.21 250.40 MICROALBUMIN, UR, RAND W/ MICROALB/CREAT RATIO     583.81 MICROALBUMIN, UR, RAND W/ MICROALB/CREAT RATIO   5. Bilateral hand numbness  R20.0 782.0    6. Gastroesophageal reflux disease without esophagitis  K21.9 530.81    7. Vitamin D deficiency  E55.9 268.9    8. Situational anxiety  F41.8 300.09    9. Constipation by delayed colonic transit  K59.01 564.01      Diagnoses and all orders for this visit:    1. Type 2 diabetes mellitus without complication, with long-term current use of insulin ([facility] [address]    2. Essential hypertension    3. Dyslipidemia, goal LDL below 100    4. Type 2 diabetes mellitus with nephropathy (HCC)  -     MICROALBUMIN, UR, RAND W/ MICROALB/CREAT RATIO; Future    5. Bilateral hand numbness    6. Gastroesophageal reflux disease without esophagitis    7. Vitamin D deficiency    8. Situational anxiety    9. Constipation by delayed colonic transit      Follow-up and Dispositions    · Return in about 3 months (around 9/18/2021) for F/U HTN,CHOL,DM, GERD, F/U DM, f/u Vitamin D deficiency, F/U of obesity, insomnia, F/U anxiety. lab results and schedule of future lab studies reviewed with patient  reviewed diet, exercise and weight control  cardiovascular risk and specific lipid/LDL goals reviewed  reviewed medications and side effects in detail  Please call my office if there are any questions- [phone]. I will arrange for follow up after the lab tests done from today return  Recommended a weekly \"heart check. \" I went into detail how to do this. Call for refills on medications as needed. Discussed expected course/resolution/complications of diagnosis in detail with patient.    Medication risks/benefits/costs/interactions/alternatives discussed with patient. Pt was given an after visit summary which includes diagnoses, current medications & vitals. Pt expressed understanding with the diagnosis and plan        BMI is significantly elevated- in the obese range. I reviewed diet, exercise and weight control. Discussed weight control in detail, the importance of mainly decreased carbs, and for weight maintenance, exercise; discussed different diets and that it isn't as important to watch the type of foods as it is to decrease calorie intake no matter what type of diet you do, etc.       Total 50 minutes  re: Recommended a weekly \"heart check. \" I went into detail how to do this. Regular exercise is very important to your health; it helps mentally, physically, socially; it prevents injuries if done properly. Exercise, even as simple as walking 20-30 minutes daily has major benefits to your health even though your \"numbers\" are the same in the lab. See if you can add this into your daily regimen and after a few months it will become a regular habit-\"just something you do,\" like brushing your teeth. A combination of aerobic exercise and strengthening and stretching is felt to be the best for you, so this should be your ultimate goal.   This can be done in the privacy of your home or in a group setting as at the gym  Some prefer having a , others prefer to do exercise in groups or individually. Do what \"works\" for you. You need to make it simple and \"fun,\" or you most likely will not continue it. Reviewed BP, cholesterol and diabetic goals. LDL goal<100,HDL goal>45, Triglyceride goal<150, A1C< 7.0, BP<140/90.      Discussed specific diabetic recommendations: low cholesterol diet, weight control and daily exercise discussed, home glucose monitoring emphasized, all medications, side effects and compliance discussed carefully, foot care discussed and Podiatry visits discussed, annual eye examinations at Ophthalmology discussed, glycohemoglobin and other lab monitoring discussed and long term diabetic complications discussed. Reviewed symptoms, or lack thereof, of hypertension and elevated cholesterol. Review of  the proper technique of checking the blood pressure- check it on an average day only, not on a stressful day, sitting, no exercise for at least 1 hour and not experiencing any new pain( chronic pain is OK). Patient encouraged to check BP sitting and standing at least once a month and to report these readings to me if > 140/ 90 on average , or if the standing BP is >  15 points lower than the sitting. Always check it twice and if there is > 5 points decrease from the previous reading( top reading or systolic) keep checking it until it does not drop 5 points. Write only this final reading down, not the preceding readings. If out of these readings there is only 1 out of 4 or less > 777, or > 90 diastolic then your blood pressure is OK; it needs further treatment if it is above this. Also, don't forget,  as noted above, to check your blood pressure standing once a month; this is to detect a drop in your BP that might lead to fainting and serious injury; you check it standing with your arm hanging straight down and relaxed. Check it twice waiting 1 minute between the two readings. If, with either one of these 2 readings there is a > 15 point drop of the systolic compared to your sitting pressure( done before the standing BP), then let me know. Following these guidelines, continue to check your BP and write down only the ones described above and it will help me to effectively treat your blood pressure. Also, discussed symptoms of concern that were noted today in the note above, treatment options( including doing nothing), when to follow up before recommended time frame. Also, answered all questions. Pt will continue to have Dr. Miriam Jackson send the results of her labs.  Her vitamin D level as she once again was not taking her vitamin D regularly (it is in a different area than her medication and never gets around to taking it). I congratulated her on her weight loss and encouraged her to continue the same. She is getting low glucose readings for someone on insulin, so I encouraged her to cut back the evening Novolog dose. The only time she gets hypoglycemic is in the morning. Of note, the metformin was switch from the XR to regular and she seems to be tolerating it better    For her constipation, I encouraged regular use of milk of magnesia and after things are working regularly, she can add fiber of choice. . Recommended high fiber either through dietary changes or adding OTC commercial fiber- metamucil,fibercon or citrucel ( all of which come in tablet or powder form) on a daily basis lifelong, starting at one dose per day and weekly increasing the daily dose to a max of 3 doses a day or until constipation controlled/ prevented. She complains of gas and I told her to cut out all milk products for 3 weeks and if that works, she can take Lactaid when she is consuming milk products. If it does not solve the problem, we talked about other ways to reduce gas by eating slower or avoiding gas producing foods, etc..    She complains of intermittent numbness in her hand but mostly when she is grasping things. This is typical of carpal tunnel, so I asked her if it was not in the small finger and if it is not, she clearly has carpal tunnel and should start using a wrist immobilizer at night time. This document was written by Sigrid Tan, as dictated by Brian Lee MD.  I have reviewed and agree with the above note and have made corrections where appropriate Henry Joiner M.D.

## 2021-08-09 LAB — HBA1C MFR BLD HPLC: 7.3 %

## 2021-10-04 RX ORDER — AMLODIPINE BESYLATE 5 MG/1
TABLET ORAL
Qty: 90 TABLET | Refills: 1 | Status: SHIPPED | OUTPATIENT
Start: 2021-10-04 | End: 2021-12-21 | Stop reason: SDUPTHER

## 2021-10-05 RX ORDER — AMLODIPINE BESYLATE 5 MG/1
TABLET ORAL
Qty: 90 TABLET | Refills: 1 | Status: SHIPPED | OUTPATIENT
Start: 2021-10-05 | End: 2022-03-31

## 2021-12-21 DIAGNOSIS — E78.01 FAMILIAL HYPERCHOLESTEROLEMIA: ICD-10-CM

## 2021-12-21 NOTE — TELEPHONE ENCOUNTER
[facility] sending requests for new rx's for patient. Thanks, Bettye    Last Visit: 6/18/21 MD Sukhdev Peters, lipid 11/2020  Next Appointment: 1/4/22 MD Sukhdev Peters  Previous Refill Encounter(s):   Amlodipine 10/4/21 90 +1 (ambar)  Atorvastatin 6/1/21 90    Requested Prescriptions     Pending Prescriptions Disp Refills    amLODIPine (NORVASC) 5 mg tablet 90 Tablet 1     Sig: Take 1 Tablet by mouth daily.  atorvastatin (LIPITOR) 40 mg tablet 90 Tablet 0     Sig: Take 1 Tablet by mouth daily.

## 2021-12-22 RX ORDER — ATORVASTATIN CALCIUM 40 MG/1
40 TABLET, FILM COATED ORAL DAILY
Qty: 90 TABLET | Refills: 0 | Status: SHIPPED | OUTPATIENT
Start: 2021-12-22 | End: 2022-03-30

## 2021-12-22 RX ORDER — AMLODIPINE BESYLATE 5 MG/1
5 TABLET ORAL DAILY
Qty: 90 TABLET | Refills: 0 | Status: SHIPPED | OUTPATIENT
Start: 2021-12-22 | End: 2022-01-04

## 2022-01-04 ENCOUNTER — OFFICE VISIT (OUTPATIENT)
Dept: FAMILY MEDICINE CLINIC | Age: 72
End: 2022-01-04
Payer: MEDICARE

## 2022-01-04 VITALS
RESPIRATION RATE: 16 BRPM | WEIGHT: 223 LBS | TEMPERATURE: 98.1 F | SYSTOLIC BLOOD PRESSURE: 136 MMHG | HEIGHT: 66 IN | OXYGEN SATURATION: 98 % | DIASTOLIC BLOOD PRESSURE: 74 MMHG | BODY MASS INDEX: 35.84 KG/M2 | HEART RATE: 77 BPM

## 2022-01-04 DIAGNOSIS — G89.29 CHRONIC RIGHT SHOULDER PAIN: ICD-10-CM

## 2022-01-04 DIAGNOSIS — E66.01 SEVERE OBESITY (BMI 35.0-35.9 WITH COMORBIDITY) (HCC): ICD-10-CM

## 2022-01-04 DIAGNOSIS — M47.812 CERVICAL SPONDYLOSIS WITHOUT MYELOPATHY: ICD-10-CM

## 2022-01-04 DIAGNOSIS — E11.21 CONTROLLED TYPE 2 DIABETES MELLITUS WITH DIABETIC NEPHROPATHY, WITH LONG-TERM CURRENT USE OF INSULIN (HCC): ICD-10-CM

## 2022-01-04 DIAGNOSIS — E11.21 TYPE 2 DIABETES MELLITUS WITH NEPHROPATHY (HCC): ICD-10-CM

## 2022-01-04 DIAGNOSIS — F41.8 SITUATIONAL ANXIETY: ICD-10-CM

## 2022-01-04 DIAGNOSIS — Z12.31 SCREENING MAMMOGRAM FOR BREAST CANCER: ICD-10-CM

## 2022-01-04 DIAGNOSIS — R20.0 BILATERAL HAND NUMBNESS: ICD-10-CM

## 2022-01-04 DIAGNOSIS — E78.5 DYSLIPIDEMIA, GOAL LDL BELOW 100: ICD-10-CM

## 2022-01-04 DIAGNOSIS — K21.9 GASTROESOPHAGEAL REFLUX DISEASE WITHOUT ESOPHAGITIS: ICD-10-CM

## 2022-01-04 DIAGNOSIS — G47.33 OSA (OBSTRUCTIVE SLEEP APNEA): ICD-10-CM

## 2022-01-04 DIAGNOSIS — J30.1 ALLERGIC RHINITIS DUE TO POLLEN, UNSPECIFIED SEASONALITY: ICD-10-CM

## 2022-01-04 DIAGNOSIS — Z71.89 ACP (ADVANCE CARE PLANNING): ICD-10-CM

## 2022-01-04 DIAGNOSIS — E55.9 VITAMIN D DEFICIENCY: ICD-10-CM

## 2022-01-04 DIAGNOSIS — Z79.4 CONTROLLED TYPE 2 DIABETES MELLITUS WITH DIABETIC NEPHROPATHY, WITH LONG-TERM CURRENT USE OF INSULIN (HCC): ICD-10-CM

## 2022-01-04 DIAGNOSIS — M25.511 CHRONIC RIGHT SHOULDER PAIN: ICD-10-CM

## 2022-01-04 DIAGNOSIS — I10 PRIMARY HYPERTENSION: Primary | ICD-10-CM

## 2022-01-04 DIAGNOSIS — K59.01 CONSTIPATION BY DELAYED COLONIC TRANSIT: ICD-10-CM

## 2022-01-04 DIAGNOSIS — Z00.00 MEDICARE ANNUAL WELLNESS VISIT, SUBSEQUENT: ICD-10-CM

## 2022-01-04 PROCEDURE — 99215 OFFICE O/P EST HI 40 MIN: CPT | Performed by: FAMILY MEDICINE

## 2022-01-04 PROCEDURE — G8752 SYS BP LESS 140: HCPCS | Performed by: FAMILY MEDICINE

## 2022-01-04 PROCEDURE — G8754 DIAS BP LESS 90: HCPCS | Performed by: FAMILY MEDICINE

## 2022-01-04 PROCEDURE — 3046F HEMOGLOBIN A1C LEVEL >9.0%: CPT | Performed by: FAMILY MEDICINE

## 2022-01-04 PROCEDURE — 3017F COLORECTAL CA SCREEN DOC REV: CPT | Performed by: FAMILY MEDICINE

## 2022-01-04 PROCEDURE — G8536 NO DOC ELDER MAL SCRN: HCPCS | Performed by: FAMILY MEDICINE

## 2022-01-04 PROCEDURE — G8510 SCR DEP NEG, NO PLAN REQD: HCPCS | Performed by: FAMILY MEDICINE

## 2022-01-04 PROCEDURE — 2022F DILAT RTA XM EVC RTNOPTHY: CPT | Performed by: FAMILY MEDICINE

## 2022-01-04 PROCEDURE — 1090F PRES/ABSN URINE INCON ASSESS: CPT | Performed by: FAMILY MEDICINE

## 2022-01-04 PROCEDURE — G0439 PPPS, SUBSEQ VISIT: HCPCS | Performed by: FAMILY MEDICINE

## 2022-01-04 PROCEDURE — 1101F PT FALLS ASSESS-DOCD LE1/YR: CPT | Performed by: FAMILY MEDICINE

## 2022-01-04 PROCEDURE — G8399 PT W/DXA RESULTS DOCUMENT: HCPCS | Performed by: FAMILY MEDICINE

## 2022-01-04 PROCEDURE — G8417 CALC BMI ABV UP PARAM F/U: HCPCS | Performed by: FAMILY MEDICINE

## 2022-01-04 PROCEDURE — G8427 DOCREV CUR MEDS BY ELIG CLIN: HCPCS | Performed by: FAMILY MEDICINE

## 2022-01-04 PROCEDURE — G9899 SCRN MAM PERF RSLTS DOC: HCPCS | Performed by: FAMILY MEDICINE

## 2022-01-04 NOTE — PROGRESS NOTES
Chief Complaint   Patient presents with    Diabetes    Hypertension    Knee Pain     left worse     Tingling     in both arms and hands    Shoulder Pain     right shoulder   1. \"Have you been to the ER, urgent care clinic since your last visit? Hospitalized since your last visit? \" No    2. \"Have you seen or consulted any other health care providers outside of the [address] [place] [address] since your last visit? \" Yes ritu and Dr Rylee Ribera     3. For patients over 45: Has the patient had a colonoscopy? Yes,  satisfied with blue hyperlink     If the patient is female:    4. For patients over 40: Has the patient had a mammogram? No    5. For patients over 21: Has the patient had a pap smear?  No

## 2022-01-04 NOTE — PROGRESS NOTES
HISTORY OF PRESENT ILLNESS  HPI  Drew La a 71 y.o. female with history of HTN, DM-II, hyperlipidemia with LDL goal < 100, and vitamin D deficiency who presents to office today c/o bilateral knee pain, tingling, and right shoulder pain and for f/u of these health problems. Pt does not check her BP outside of the office. She checks her glucose at home. She has been using the constant glucose monitor for the past 3 months. She is seeing Dr. Nino Doshi (endocrinology) for f/u of her DM. Pt estimates her last A1C was around 7.0. No significant episodes of hypoglycemia    Pt reports she has some bilateral tingling in her arms and hands bilaterally. The tingling is worse at night time when she is about to go to sleep. Pt is being followed by Dr. Artem Perez (chiropractor) for f/u of her back and neck discomfort. Pt reports she has some bilateral knee discomfort, left worse than right. The pain bothers her when she sits for a while then gets up and when she is going up stairs. The right knee bothers her very little. Pt adds that she is doing cold laser treatments with Dr. Artem Perez for her knees with significant improvement. She has notices some tingling in her feet bilaterally and will follow up with Dr. Nino Doshi for that. States does not recall having a diabetic foot check    Pt denies unusual SOB, chest pain, and any recent ER visits or hospitalizations.        Past Medical History:   Diagnosis Date    Adverse effect of anesthesia     one month after some surgeries had swelling of one lip/tongue/side of face x 1-2 months    Allergic rhinitis     Cancer ([facility] [address]     cancer cells in uterus - surgery    DDD (degenerative disc disease)     thoracic and cervical spine    Diverticulosis     hx diverticulosis    Dyslipidemia, goal LDL below 100 9/29/2017    Eczema     Glaucoma     glaucoma    Hiatal hernia     hiatal hernia    Hyperlipidemia     Hypertension     Ill-defined condition     bulging disc/pinched nerve low back Having surgery CJW 1/22/19    Ill-defined condition     overweight    Lichen sclerosus     dx at [facility]    ANMOL (obstructive sleep apnea)     CPAP - does not use regularly    Reflux esophagitis     Situational anxiety 9/29/2017    Stomach ulcer     Type II or unspecified type diabetes mellitus without mention of complication, not stated as uncontrolled      Past Surgical History:   Procedure Laterality Date    COLONOSCOPY N/A 2/27/2018    COLONOSCOPY performed by Nicholas Kenny MD at [facility] ENDOSCOPY    HX BACK SURGERY      x 3 - total of 3 back surgeries as of 2/26/2018/5 back surgeries as of 6/17/2020    HX COLONOSCOPY  2013    HX DILATION AND CURETTAGE      x 3 for abnormal bleeding    HX GYN      tumors removed from uterus - had cancer cells    HX HEENT  2002    sinus surgery for snoring & partial Uvelectomy    HX HIP REPLACEMENT  2004    rt    HX ORTHOPAEDIC Left 07/2013    hip replacement    HX TONSIL AND ADENOIDECTOMY      pt does not think her adenoids were removed    HX TUBAL LIGATION      [place] DCMPRN PERQ NUCLEUS PULPOSUS 1/> LEVELS LUMBAR  2003    L3-4    KS PALATOPHAYNGOPLASTY  2002    KS TOTAL ABDOM HYSTERECTOMY       Current Outpatient Medications on File Prior to Visit   Medication Sig Dispense Refill    atorvastatin (LIPITOR) 40 mg tablet Take 1 Tablet by mouth daily. 90 Tablet 0    amLODIPine (NORVASC) 5 mg tablet TAKE 1 TABLET BY MOUTH ONCE DAILY 90 Tablet 1    semaglutide (Ozempic) 0.25 mg/0.2 mL (2 mg/1.5 mL) sub-q pen by SubCUTAneous route every seven (7) days.  mirabegron (MYRBETRIQ PO) Take  by mouth.  insulin aspart (NOVOLOG FLEXPEN U-100 INSULIN SC) 16-20 Units by SubCUTAneous route daily (with dinner).  latanoprost (XALATAN) 0.005 % ophthalmic solution Administer 1 Drop to both eyes nightly. 0    CHOLECALCIFEROL, VITAMIN D3, (VITAMIN D3 PO) Take 3,000 Units by mouth daily.       insulin glargine (Stephen Pastel U-300 INSULIN) 300 unit/mL (1.5 mL) inpn 64 Units by SubCUTAneous route nightly.  polyethylene glycol (MIRALAX) 17 gram/dose powder Take 17 g by mouth nightly as needed.  ONETOUCH ULTRA TEST strip   0    halobetasol (ULTRAVATE) 0.05 % ointment Apply  to affected area as needed. 0    metFORMIN (GLUCOPHAGE) 500 mg tablet Take 500 mg by mouth two (2) times daily (with meals).  [DISCONTINUED] coenzyme Q-10 (Co Q-10) 200 mg capsule Take 200 mg by mouth daily. (Patient not taking: Reported on 1/4/2022)      [DISCONTINUED] calcium carbonate (TUMS PO) Take 2 Tabs by mouth two (2) times daily as needed. (Patient not taking: Reported on 1/4/2022)      BD INSULIN PEN NEEDLE UF MINI 31 gauge x 3/16\" ndle use four times a day for INSULIN INJECTIONS as directed  1     No current facility-administered medications on file prior to visit. Allergies   Allergen Reactions    Aspirin Nausea and Vomiting     Can take a coated aspirin.  Benicar [Olmesartan] Swelling     Pt states she is not allergic to this.  Ciprocinonide Swelling     Pt. States not allergic to this med.  Ciprofloxacin Swelling     Pt states she is not allergic to this.  Crestor [Rosuvastatin] Myalgia     Pt. Denies allergy .  Famotidine Itching     All other.  Sulfa (Sulfonamide Antibiotics) Hives     Pt.  Denies allergy     Family History   Problem Relation Age of Onset    Cancer Maternal Grandfather         lung    Cancer Paternal Grandfather         colon    Heart Attack Mother     Dementia Father         alzheimers    Heart Disease Father     Kidney Disease Father     Dementia Maternal Grandmother         alzheimers    Breast Cancer Paternal Aunt     Other Sister         hysterectomy    Other Brother         shingles    Stroke Sister         mini    Other Nephew         balance disorder     Social History     Socioeconomic History    Marital status: SINGLE   Tobacco Use    Smoking status: Never Smoker    Smokeless tobacco: Never Used   Vaping Use    Vaping Use: Never used   Substance and Sexual Activity    Alcohol use: Yes     Comment: occasional    Drug use: No    Sexual activity: Not Currently   Other Topics Concern     Service No    Blood Transfusions No    Caffeine Concern No    Occupational Exposure No    Hobby Hazards No    Sleep Concern Yes    Stress Concern Yes    Weight Concern Yes    Special Diet No    Back Care Yes     Comment: 3 back surgeries    Exercise No    Seat Belt Yes    Self-Exams Yes   Social History Narrative    Lives at home with brother. Review of Systems   Constitutional: Negative for chills, diaphoresis, fever, malaise/fatigue and weight loss. Eyes: Negative for blurred vision, double vision, pain and redness. Respiratory: Negative for cough, shortness of breath and wheezing. Cardiovascular: Negative for chest pain, palpitations, orthopnea, claudication, leg swelling and PND. Genitourinary: Negative for frequency. Skin: Negative for itching and rash. Neurological: Negative for dizziness, tingling, tremors, sensory change, speech change, focal weakness, seizures, loss of consciousness, weakness and headaches. Endo/Heme/Allergies: Negative for environmental allergies and polydipsia. Does not bruise/bleed easily. Results for orders placed or performed in visit on 09/07/21   AMB EXT HGBA1C   Result Value Ref Range    Hemoglobin A1c, External 7.3 %             Physical Exam  Visit Vitals  /74 (BP 1 Location: Left arm, BP Patient Position: Sitting, BP Cuff Size: Large adult)   Pulse 77   Temp 98.1 °F (36.7 °C)   Resp 16   Ht 5' 6\" (1.676 m)   Wt 223 lb (101.2 kg)   LMP 09/23/2009 Comment: occassinal spotting   SpO2 98%   BMI 35.99 kg/m²           Head: Normocephalic, without obvious abnormality, atraumatic  Eyes: conjunctivae/corneas clear. PERRL, EOM's intact.    Neck: supple, symmetrical, trachea midline, no adenopathy, thyroid: not enlarged, symmetric, no tenderness/mass/nodules, no carotid bruit and no JVD  Lungs: clear to auscultation bilaterally  Heart: regular rate and rhythm, S1, S2 normal, no murmur, click, rub or gallop  Extremities: extremities normal, atraumatic, no cyanosis or edema. Negative Tinel's and Phalen bilaterally. Fairly good range of motion of the right shoulder except in abduction. There is increased pain with abduction against resistance and use of the rotator cuff against resistance. Knees: has bilateral, very mild crepitation with flexing and extending the knee. Back: mild discomfort with ROM of the back in general but negative SLR   Pulses: 2+ and symmetric  Lymph nodes: Cervical, supraclavicular, and axillary nodes normal.  Neurologic: Grossly normal          ASSESSMENT and PLAN    ICD-10-CM ICD-9-CM    1. Primary hypertension  S69 758.7 METABOLIC PANEL, COMPREHENSIVE   2. Dyslipidemia, goal LDL below 100  E78.5 272.4 LIPID PANEL      METABOLIC PANEL, COMPREHENSIVE   3. Type 2 diabetes mellitus with nephropathy ([facility])  E11.21 250.40      583.81    4. Severe obesity (BMI 35.0-35.9 with comorbidity) ([facility])  E66.01 278.01     Z68.35 V85.35    5. Screening mammogram for breast cancer  Z12.31 V76.12 [facility] MAMMO BI SCREENING INCL CAD   6. Gastroesophageal reflux disease without esophagitis  K21.9 530.81    7. Vitamin D deficiency  E55.9 268.9    8. Situational anxiety  F41.8 300.09    9. Constipation by delayed colonic transit  K59.01 564.01    10. Bilateral hand numbness  R20.0 782.0    11. ANMOL (obstructive sleep apnea)  G47.33 327.23    12. Cervical spondylosis without myelopathy  M47.812 721.0    13. Allergic rhinitis due to pollen, unspecified seasonality  J30.1 477.0    14. Medicare annual wellness visit, subsequent  Z00.00 V70.0    15. ACP (advance care planning)  Z71.89 V65.49    16.  Controlled type 2 diabetes mellitus with diabetic nephropathy, with long-term current use of insulin ([facility])  E11.21 250.40 MICROALBUMIN, UR, RAND W/ MICROALB/CREAT RATIO    Z79.4 583.81 HEMOGLOBIN A1C WITH EAG     V58.67 CANCELED:  DIABETES FOOT EXAM   17. Chronic right shoulder pain  M25.511 719.41     G89.29 338.29     bursitis and rotator cuff inflammaton       Diagnoses and all orders for this visit:    1. Primary hypertension  -     METABOLIC PANEL, COMPREHENSIVE; Future    2. Dyslipidemia, goal LDL below 100  -     LIPID PANEL; Future  -     METABOLIC PANEL, COMPREHENSIVE; Future    3. Type 2 diabetes mellitus with nephropathy ([facility] [address]    4. Severe obesity (BMI 35.0-35.9 with comorbidity) ([facility] [address]    5. Screening mammogram for breast cancer  -     [facility] MAMMO BI SCREENING INCL CAD; Future    6. Gastroesophageal reflux disease without esophagitis    7. Vitamin D deficiency    8. Situational anxiety    9. Constipation by delayed colonic transit    10. Bilateral hand numbness    11. ANMOL (obstructive sleep apnea)    12. Cervical spondylosis without myelopathy    13. Allergic rhinitis due to pollen, unspecified seasonality    14. Medicare annual wellness visit, subsequent    15. ACP (advance care planning)    16. Controlled type 2 diabetes mellitus with diabetic nephropathy, with long-term current use of insulin (HCC)  -     MICROALBUMIN, UR, RAND W/ MICROALB/CREAT RATIO; Future  -     HEMOGLOBIN A1C WITH EAG; Future    17. Chronic right shoulder pain  Comments:  bursitis and rotator cuff inflammaton              lab results and schedule of future lab studies reviewed with patient  reviewed diet, exercise and weight control  cardiovascular risk and specific lipid/LDL goals reviewed  reviewed medications and side effects in detail  Please call my office if there are any questions- [phone]. I will arrange for follow up after the lab tests done from today return  Recommended a weekly \"heart check. \" I went into detail how to do this. Call for refills on medications as needed.   Discussed expected course/resolution/complications of diagnosis in detail with patient. Medication risks/benefits/costs/interactions/alternatives discussed with patient. Pt was given an after visit summary which includes diagnoses, current medications & vitals. Pt expressed understanding with the diagnosis and plan      BMI is significantly elevated- in the obese range. I reviewed diet, exercise and weight control. Discussed weight control in detail, the importance of mainly decreased carbs, and for weight maintenance, exercise; discussed different diets and that it isn't as important to watch the type of foods as it is to decrease calorie intake no matter what type of diet you do, etc.       Total 45 minutes  re: Recommended a weekly \"heart check. \" I went into detail how to do this. Regular exercise is very important to your health; it helps mentally, physically, socially; it prevents injuries if done properly. Exercise, even as simple as walking 20-30 minutes daily has major benefits to your health even though your \"numbers\" are the same in the lab. See if you can add this into your daily regimen and after a few months it will become a regular habit-\"just something you do,\" like brushing your teeth. A combination of aerobic exercise and strengthening and stretching is felt to be the best for you, so this should be your ultimate goal.   This can be done in the privacy of your home or in a group setting as at the gym  Some prefer having a , others prefer to do exercise in groups or individually. Do what \"works\" for you. You need to make it simple and \"fun,\" or you most likely will not continue it. Reviewed BP, cholesterol and diabetic goals. LDL goal<100,HDL goal>45, Triglyceride goal<150, A1C< 7.0, BP<140/90.      Discussed specific diabetic recommendations: low cholesterol diet, weight control and daily exercise discussed, home glucose monitoring emphasized, all medications, side effects and compliance discussed carefully, foot care discussed and Podiatry visits discussed, annual eye examinations at Ophthalmology discussed, glycohemoglobin and other lab monitoring discussed and long term diabetic complications discussed. Reviewed symptoms, or lack thereof, of hypertension and elevated cholesterol. Review of  the proper technique of checking the blood pressure- check it on an average day only, not on a stressful day, sitting, no exercise for at least 1 hour and not experiencing any new pain( chronic pain is OK). Patient encouraged to check BP sitting and standing at least once a month and to report these readings to me if > 140/ 90 on average , or if the standing BP is >  15 points lower than the sitting. Always check it twice and if there is > 5 points decrease from the previous reading( top reading or systolic) keep checking it until it does not drop 5 points. Write only this final reading down, not the preceding readings. If out of these readings there is only 1 out of 4 or less > 165, or > 90 diastolic then your blood pressure is OK; it needs further treatment if it is above this. Also, don't forget,  as noted above, to check your blood pressure standing once a month; this is to detect a drop in your BP that might lead to fainting and serious injury; you check it standing with your arm hanging straight down and relaxed. Check it twice waiting 1 minute between the two readings. If, with either one of these 2 readings there is a > 15 point drop of the systolic compared to your sitting pressure( done before the standing BP), then let me know. Following these guidelines, continue to check your BP and write down only the ones described above and it will help me to effectively treat your blood pressure. Also, discussed symptoms of concern that were noted today in the note above, treatment options( including doing nothing), when to follow up before recommended time frame. Also, answered all questions.     I encouraged pt to have Dr. Tacos Cormier send a copy of her lab work each time it is done. She checks the DM, cholesterol, and etc.    I informed pt that the tingling in her hands that happens intermittently appears to be carpal tunnel but she needs to check to see which finger becomes numb, especially when it is most severe which is at night time. If it is carpal tunnel, we will have her wear wrist immobilizer at night time only and she will let us know if that does not work for the tingling. She also notes that she has a long hx of back discomfort, both arthritis and spinal stenosis apparently. I discussed the difference between those two and that spinal stenosis is difficult to treat and she should go back to her back specialist to discuss options at this point. She is due a mammogram, so we will do that. We talked in detail about weight control as well. She is aware of the benefit for her DM as she was under much better control a couple of months ago before she gained her weight back. BMI is significantly elevated- in the obese range. I reviewed diet, exercise and weight control. Discussed weight control in detail, the importance of mainly decreased carbs, and for weight maintenance, exercise; discussed different diets and that it isn't as important to watch the type of foods as it is to decrease calorie intake no matter what type of diet you do, etc.     Right shoulder pain appears to be a combination of ubrsitis and rotator cuff inflammation. Reviewed and demonstrated 4 separate shoulder exercises that should be done BID doing each one 3 times; should follow up if not doing better each week as far as less pain and/ or improved range of motion. This document was written by Bryn Lovett, as dictated by Sammi Argueta MD.  I have reviewed and agree with the above note and have made corrections where appropriate Henry Bingham M.D.   This is the Subsequent Medicare Annual Wellness Exam, performed 12 months or more after the Initial AWV or the last Subsequent AWV    I have reviewed the patient's medical history in detail and updated the computerized patient record. Assessment/Plan   Education and counseling provided:  Are appropriate based on today's review and evaluation    1. Primary hypertension  -     METABOLIC PANEL, COMPREHENSIVE; Future  2. Dyslipidemia, goal LDL below 100  -     LIPID PANEL; Future  -     METABOLIC PANEL, COMPREHENSIVE; Future  3. Type 2 diabetes mellitus with nephropathy ([facility] [address]  4. Severe obesity (BMI 35.0-35.9 with comorbidity) ([facility] [address]  5. Screening mammogram for breast cancer  -     [facility] MAMMO BI SCREENING INCL CAD; Future  6. Gastroesophageal reflux disease without esophagitis  7. Vitamin D deficiency  8. Situational anxiety  9. Constipation by delayed colonic transit  10. Bilateral hand numbness  11. ANMOL (obstructive sleep apnea)  12. Cervical spondylosis without myelopathy  13. Allergic rhinitis due to pollen, unspecified seasonality  14. Medicare annual wellness visit, subsequent  15. ACP (advance care planning)  16. Controlled type 2 diabetes mellitus with diabetic nephropathy, with long-term current use of insulin (HCC)  -     MICROALBUMIN, UR, RAND W/ MICROALB/CREAT RATIO; Future  -     HEMOGLOBIN A1C WITH EAG; Future  17. Chronic right shoulder pain  Comments:  bursitis and rotator cuff inflammaton         Depression Risk Factor Screening     3 most recent PHQ Screens 1/4/2022   Little interest or pleasure in doing things Not at all   Feeling down, depressed, irritable, or hopeless Not at all   Total Score PHQ 2 0       Alcohol & Drug Abuse Risk Screen    Do you average more than 1 drink per night or more than 7 drinks a week:  No    On any one occasion in the past three months have you have had more than 3 drinks containing alcohol:  No          Functional Ability and Level of Safety    Hearing: Hearing is good. Activities of Daily Living: The home contains: no safety equipment.   Patient does total self care Ambulation: with no difficulty     Fall Risk:  Fall Risk Assessment, last 12 mths 1/4/2022   Able to walk? Yes   Fall in past 12 months? 0   Do you feel unsteady? -   Are you worried about falling 0   Number of falls in past 12 months -   Fall with injury? -      Abuse Screen:  Patient is not abused       Cognitive Screening    Has your family/caregiver stated any concerns about your memory: no     Cognitive Screening: Normal - Mini Cog Test  I reviewed advanced directive information and written information given to patient; patient to make follow up appointment with a NN for any questions,to review choices made for health care, agent, and anatomical gifts that are on the advanced directive at home.      Health Maintenance Due     Health Maintenance Due   Topic Date Due    Shingrix Vaccine Age 49> (1 of 2) Never done    Breast Cancer Screen Mammogram  10/15/2020    COVID-19 Vaccine (3 - Booster for Pfizer series) 09/25/2021    Lipid Screen  11/03/2021    Medicare Yearly Exam  11/04/2021       Patient Care Team   Patient Care Team:  Quin Adams MD as PCP - Ashley Montano MD as PCP - 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 [address] E11.9, Z79.4    Situational anxiety F41.8    Dyslipidemia, goal LDL below 100 E78.5    Type 2 diabetes mellitus with nephropathy ([facility] [address] E11.21    Colon adenoma D12.6    Postmenopausal bleeding N95.0    History of glaucoma Z86.69    Gastroesophageal reflux disease without esophagitis K21.9     Past Medical History:   Diagnosis Date    Adverse effect of anesthesia     one month after some surgeries had swelling of one lip/tongue/side of face x 1-2 months    Allergic rhinitis     Cancer ([facility] [address]     cancer cells in uterus - surgery    DDD (degenerative disc disease)     thoracic and cervical spine    Diverticulosis     hx diverticulosis    Dyslipidemia, goal LDL below 100 9/29/2017    Eczema     Glaucoma     glaucoma    Hiatal hernia     hiatal hernia    Hyperlipidemia     Hypertension     Ill-defined condition     bulging disc/pinched nerve low back Having surgery CJW 1/22/19    Ill-defined condition     overweight    Lichen sclerosus     dx at [facility]    ANMOL (obstructive sleep apnea)     CPAP - does not use regularly    Reflux esophagitis     Situational anxiety 9/29/2017    Stomach ulcer     Type II or unspecified type diabetes mellitus without mention of complication, not stated as uncontrolled       Past Surgical History:   Procedure Laterality Date    COLONOSCOPY N/A 2/27/2018    COLON[facility] by Araceli Westbrook MD at [facility] ENDOSCOPY    HX BACK SURGERY      x 3 - total of 3 back surgeries as of 2/26/2018/5 back surgeries as of 6/17/2020    HX COLONOSCOPY  2013    HX DILATION AND CURETTAGE      x 3 for abnormal bleeding    HX GYN      tumors removed from uterus - had cancer cells    HX HEENT  2002    sinus surgery for snoring & partial Uvelectomy    HX HIP REPLACEMENT  2004    rt    HX ORTHOPAEDIC Left 07/2013    hip replacement    HX TONSIL AND ADENOIDECTOMY      pt does not think her adenoids were removed    HX TUBAL LIGATION      TX DCMPRN PERQ NUCLEUS PULPOSUS 1/> LEVELS LUMBAR  2003    L3-4    TX PALATOPHAYNGOPLASTY  2002    TX TOTAL ABDOM HYSTERECTOMY       Current Outpatient Medications   Medication Sig Dispense Refill    atorvastatin (LIPITOR) 40 mg tablet Take 1 Tablet by mouth daily. 90 Tablet 0    amLODIPine (NORVASC) 5 mg tablet TAKE 1 TABLET BY MOUTH ONCE DAILY 90 Tablet 1    semaglutide (Ozempic) 0.25 mg/0.2 mL (2 mg/1.5 mL) sub-q pen by SubCUTAneous route every seven (7) days.  mirabegron (MYRBETRIQ PO) Take  by mouth.  insulin aspart (NOVOLOG FLEXPEN U-100 INSULIN SC) 16-20 Units by SubCUTAneous route daily (with dinner).  latanoprost (XALATAN) 0.005 % ophthalmic solution Administer 1 Drop to both eyes nightly. 0    CHOLECALCIFEROL, VITAMIN D3, (VITAMIN D3 PO) Take 3,000 Units by mouth daily.  insulin glargine (TOUJEO SOLOSTAR U-300 INSULIN) 300 unit/mL (1.5 mL) inpn 64 Units by SubCUTAneous route nightly.  polyethylene glycol (MIRALAX) 17 gram/dose powder Take 17 g by mouth nightly as needed.  ONETOUCH ULTRA TEST strip   0    halobetasol (ULTRAVATE) 0.05 % ointment Apply  to affected area as needed. 0    metFORMIN (GLUCOPHAGE) 500 mg tablet Take 500 mg by mouth two (2) times daily (with meals).  BD INSULIN PEN NEEDLE UF MINI 31 gauge x 3/16\" ndle use four times a day for INSULIN INJECTIONS as directed  1     Allergies   Allergen Reactions    Aspirin Nausea and Vomiting     Can take a coated aspirin.  Benicar [Olmesartan] Swelling     Pt states she is not allergic to this.  Ciprocinonide Swelling     Pt. States not allergic to this med.  Ciprofloxacin Swelling     Pt states she is not allergic to this.  Crestor [Rosuvastatin] Myalgia     Pt. Denies allergy .  Famotidine Itching     All other.  Sulfa (Sulfonamide Antibiotics) Hives     Pt.  Denies allergy       Family History   Problem Relation Age of Onset    Cancer Maternal Grandfather         lung    Cancer Paternal Grandfather         colon    Heart Attack Mother     Dementia Father         alzheimers    Heart Disease Father     Kidney Disease Father     Dementia Maternal Grandmother         alzheimers    Breast Cancer Paternal Aunt     Other Sister         hysterectomy    Other Brother         shingles    Stroke Sister         mini    Other Nephew         balance disorder     Social History     Tobacco Use    Smoking status: Never Smoker    Smokeless tobacco: Never Used   Substance Use Topics    Alcohol use: Yes     Comment: occasional         Shiv White MD

## 2022-01-08 NOTE — PATIENT INSTRUCTIONS

## 2022-01-31 ENCOUNTER — TRANSCRIBE ORDER (OUTPATIENT)
Dept: SCHEDULING | Age: 72
End: 2022-01-31

## 2022-01-31 DIAGNOSIS — Z12.31 SCREENING MAMMOGRAM FOR HIGH-RISK PATIENT: Primary | ICD-10-CM

## 2022-02-11 ENCOUNTER — HOSPITAL ENCOUNTER (OUTPATIENT)
Dept: MAMMOGRAPHY | Age: 72
Discharge: HOME | End: 2022-02-11
Attending: FAMILY MEDICINE
Payer: MEDICARE

## 2022-02-11 DIAGNOSIS — Z12.31 SCREENING MAMMOGRAM FOR HIGH-RISK PATIENT: ICD-10-CM

## 2022-02-11 PROCEDURE — 77063 BREAST TOMOSYNTHESIS BI: CPT

## 2022-03-19 PROBLEM — E11.9 TYPE 2 DIABETES MELLITUS WITHOUT COMPLICATION, WITH LONG-TERM CURRENT USE OF INSULIN (HCC): Status: ACTIVE | Noted: 2017-09-29

## 2022-03-19 PROBLEM — Z79.4 TYPE 2 DIABETES MELLITUS WITHOUT COMPLICATION, WITH LONG-TERM CURRENT USE OF INSULIN (HCC): Status: ACTIVE | Noted: 2017-09-29

## 2022-03-19 PROBLEM — K21.9 GASTROESOPHAGEAL REFLUX DISEASE WITHOUT ESOPHAGITIS: Status: ACTIVE | Noted: 2020-11-08

## 2022-03-19 PROBLEM — D12.6 COLON ADENOMA: Status: ACTIVE | Noted: 2018-08-28

## 2022-03-19 PROBLEM — E11.21 TYPE 2 DIABETES MELLITUS WITH NEPHROPATHY (HCC): Status: ACTIVE | Noted: 2018-01-13

## 2022-03-19 PROBLEM — Z86.69 HISTORY OF GLAUCOMA: Status: ACTIVE | Noted: 2020-11-08

## 2022-03-19 PROBLEM — E78.5 DYSLIPIDEMIA, GOAL LDL BELOW 100: Status: ACTIVE | Noted: 2017-09-29

## 2022-03-19 PROBLEM — Z71.89 ACP (ADVANCE CARE PLANNING): Status: ACTIVE | Noted: 2017-02-13

## 2022-03-19 PROBLEM — F41.8 SITUATIONAL ANXIETY: Status: ACTIVE | Noted: 2017-09-29

## 2022-03-20 PROBLEM — N95.0 POSTMENOPAUSAL BLEEDING: Status: ACTIVE | Noted: 2019-01-16

## 2022-03-30 DIAGNOSIS — E78.01 FAMILIAL HYPERCHOLESTEROLEMIA: ICD-10-CM

## 2022-03-31 RX ORDER — ATORVASTATIN CALCIUM 40 MG/1
TABLET, FILM COATED ORAL
Qty: 90 TABLET | Refills: 1 | Status: SHIPPED | OUTPATIENT
Start: 2022-03-31

## 2022-03-31 RX ORDER — AMLODIPINE BESYLATE 5 MG/1
TABLET ORAL
Qty: 90 TABLET | Refills: 1 | Status: SHIPPED | OUTPATIENT
Start: 2022-03-31

## 2022-07-12 ENCOUNTER — OFFICE VISIT (OUTPATIENT)
Dept: FAMILY MEDICINE CLINIC | Age: 72
End: 2022-07-12
Payer: MEDICARE

## 2022-07-12 VITALS
TEMPERATURE: 97.3 F | HEIGHT: 66 IN | SYSTOLIC BLOOD PRESSURE: 130 MMHG | WEIGHT: 224 LBS | HEART RATE: 93 BPM | OXYGEN SATURATION: 98 % | BODY MASS INDEX: 36 KG/M2 | RESPIRATION RATE: 18 BRPM | DIASTOLIC BLOOD PRESSURE: 80 MMHG

## 2022-07-12 DIAGNOSIS — E55.9 VITAMIN D DEFICIENCY: ICD-10-CM

## 2022-07-12 DIAGNOSIS — E78.5 DYSLIPIDEMIA, GOAL LDL BELOW 100: ICD-10-CM

## 2022-07-12 DIAGNOSIS — Z79.4 CONTROLLED TYPE 2 DIABETES MELLITUS WITH DIABETIC NEPHROPATHY, WITH LONG-TERM CURRENT USE OF INSULIN (HCC): Primary | ICD-10-CM

## 2022-07-12 DIAGNOSIS — R20.2 TINGLING IN EXTREMITIES: ICD-10-CM

## 2022-07-12 DIAGNOSIS — N32.81 OAB (OVERACTIVE BLADDER): ICD-10-CM

## 2022-07-12 DIAGNOSIS — J30.1 ALLERGIC RHINITIS DUE TO POLLEN, UNSPECIFIED SEASONALITY: ICD-10-CM

## 2022-07-12 DIAGNOSIS — E11.21 CONTROLLED TYPE 2 DIABETES MELLITUS WITH DIABETIC NEPHROPATHY, WITH LONG-TERM CURRENT USE OF INSULIN (HCC): Primary | ICD-10-CM

## 2022-07-12 DIAGNOSIS — K21.9 GASTROESOPHAGEAL REFLUX DISEASE WITHOUT ESOPHAGITIS: ICD-10-CM

## 2022-07-12 DIAGNOSIS — E66.01 SEVERE OBESITY (BMI 35.0-35.9 WITH COMORBIDITY) (HCC): ICD-10-CM

## 2022-07-12 DIAGNOSIS — G47.33 OSA (OBSTRUCTIVE SLEEP APNEA): ICD-10-CM

## 2022-07-12 DIAGNOSIS — I10 ESSENTIAL HYPERTENSION: ICD-10-CM

## 2022-07-12 DIAGNOSIS — K59.01 CONSTIPATION BY DELAYED COLONIC TRANSIT: ICD-10-CM

## 2022-07-12 DIAGNOSIS — G62.9 NEUROPATHY: ICD-10-CM

## 2022-07-12 DIAGNOSIS — F41.8 SITUATIONAL ANXIETY: ICD-10-CM

## 2022-07-12 DIAGNOSIS — E11.21 TYPE 2 DIABETES MELLITUS WITH NEPHROPATHY (HCC): ICD-10-CM

## 2022-07-12 PROCEDURE — 3052F HG A1C>EQUAL 8.0%<EQUAL 9.0%: CPT | Performed by: FAMILY MEDICINE

## 2022-07-12 PROCEDURE — 99215 OFFICE O/P EST HI 40 MIN: CPT | Performed by: FAMILY MEDICINE

## 2022-07-12 PROCEDURE — 2022F DILAT RTA XM EVC RTNOPTHY: CPT | Performed by: FAMILY MEDICINE

## 2022-07-12 PROCEDURE — G8427 DOCREV CUR MEDS BY ELIG CLIN: HCPCS | Performed by: FAMILY MEDICINE

## 2022-07-12 PROCEDURE — 1101F PT FALLS ASSESS-DOCD LE1/YR: CPT | Performed by: FAMILY MEDICINE

## 2022-07-12 PROCEDURE — G9899 SCRN MAM PERF RSLTS DOC: HCPCS | Performed by: FAMILY MEDICINE

## 2022-07-12 PROCEDURE — G8417 CALC BMI ABV UP PARAM F/U: HCPCS | Performed by: FAMILY MEDICINE

## 2022-07-12 PROCEDURE — G8432 DEP SCR NOT DOC, RNG: HCPCS | Performed by: FAMILY MEDICINE

## 2022-07-12 PROCEDURE — 3017F COLORECTAL CA SCREEN DOC REV: CPT | Performed by: FAMILY MEDICINE

## 2022-07-12 PROCEDURE — 1090F PRES/ABSN URINE INCON ASSESS: CPT | Performed by: FAMILY MEDICINE

## 2022-07-12 PROCEDURE — G8399 PT W/DXA RESULTS DOCUMENT: HCPCS | Performed by: FAMILY MEDICINE

## 2022-07-12 PROCEDURE — G8536 NO DOC ELDER MAL SCRN: HCPCS | Performed by: FAMILY MEDICINE

## 2022-07-12 PROCEDURE — 1123F ACP DISCUSS/DSCN MKR DOCD: CPT | Performed by: FAMILY MEDICINE

## 2022-07-12 RX ORDER — MIRABEGRON 50 MG/1
50 TABLET, FILM COATED, EXTENDED RELEASE ORAL
COMMUNITY
Start: 2022-05-19

## 2022-07-12 NOTE — PROGRESS NOTES
Chief Complaint   Patient presents with    Diabetes    Cholesterol Problem    Hypertension    Back Pain     tightness    Numbness     hand and feet     1. Have you been to the ER, urgent care clinic since your last visit? Hospitalized since your last visit? No    2. Have you seen or consulted any other health care providers outside of the [address] [place] [address] since your last visit? Include any pap smears or colon screening.  No

## 2022-07-12 NOTE — PROGRESS NOTES
HISTORY OF PRESENT ILLNESS  HPI  Sagar Baird a 71 y.o. female with history of HTN, DM-II, hyperlipidemia with LDL goal < 100, and vitamin D deficiency who presents to office today c/o dry cough, food getting stuck in her esophagus, bilateral knee pain, and numbness, and for f/u of these health problems. Pt ate a sausage and egg sandwich at 12 pm today. Pt reports she has a dry cough year round but it worsened over the last couple of months. She has tried Mucinex and Nyquil at night with no help. Pt has not tried daily allergy medications. She has also noticed food will get stuck when she swallows. She is not a smoker. Pt has used Tums and bed elevation for this with no relief. She only eats lunch and supper. Pt denies unusual SOB, chest pain, and any recent ER visits or hospitalizations.          Past Medical History:   Diagnosis Date    Adverse effect of anesthesia     one month after some surgeries had swelling of one lip/tongue/side of face x 1-2 months    Allergic rhinitis     Cancer ([facility]     cancer cells in uterus - surgery    DDD (degenerative disc disease)     thoracic and cervical spine    Diverticulosis     hx diverticulosis    Dyslipidemia, goal LDL below 100 9/29/2017    Eczema     Glaucoma     glaucoma    Hiatal hernia     hiatal hernia    Hyperlipidemia     Hypertension     Ill-defined condition     bulging disc/pinched nerve low back Having surgery [facility] 1/22/19    Ill-defined condition     overweight    Lichen sclerosus     dx at [address] [facility]    Menopause     LMP-unknown    ANMOL (obstructive sleep apnea)     CPAP - does not use regularly    Reflux esophagitis     Situational anxiety 9/29/2017    Stomach ulcer     Type II or unspecified type diabetes mellitus without mention of complication, not stated as uncontrolled      Past Surgical History:   Procedure Laterality Date    COLONOSCOPY N/A 2/27/2018    COLONOSCOPY performed by Jamaal David [facility] at [facility] ENDOSCOPY    HX BACK SURGERY      x 3 - total of 3 back surgeries as of 2/26/2018/5 back surgeries as of 6/17/2020    HX COLONOSCOPY  2013    HX DILATION AND CURETTAGE      x 3 for abnormal bleeding    HX GYN      tumors removed from uterus - had cancer cells    HX HEENT  2002    sinus surgery for snoring & partial Uvelectomy    HX HIP REPLACEMENT  2004    rt    HX ORTHOPAEDIC Left 07/2013    hip replacement    HX TONSIL AND ADENOIDECTOMY      pt does not think her adenoids were removed    HX TUBAL LIGATION      [place] DCMPRN PERQ NUCLEUS PULPOSUS 1/> LEVELS LUMBAR  2003    L3-4    [place] PALATOPHAYNGOPLASTY  2002    [place] TOTAL ABDOM HYSTERECTOMY  2 years ago, per pt. Current Outpatient Medications on File Prior to Visit   Medication Sig Dispense Refill    Myrbetriq 50 mg ER tablet 50 mg.      amLODIPine (NORVASC) 5 mg tablet TAKE 1 TABLET EVERY DAY 90 Tablet 1    atorvastatin (LIPITOR) 40 mg tablet TAKE 1 TABLET EVERY DAY 90 Tablet 1    insulin aspart (NOVOLOG FLEXPEN U-100 INSULIN SC) 16-20 Units by SubCUTAneous route daily (with dinner).  latanoprost (XALATAN) 0.005 % ophthalmic solution Administer 1 Drop to both eyes nightly. 0    CHOLECALCIFEROL, VITAMIN D3, (VITAMIN D3 PO) Take 3,000 Units by mouth daily.  insulin glargine (TOUJEO SOLOSTAR U-300 INSULIN) 300 unit/mL (1.5 mL) inpn 64 Units by SubCUTAneous route nightly.  polyethylene glycol (MIRALAX) 17 gram/dose powder Take 17 g by mouth nightly as needed.  BD INSULIN PEN NEEDLE UF MINI 31 gauge x 3/16\" ndle use four times a day for INSULIN INJECTIONS as directed  1    ONETOUCH ULTRA TEST strip   0    halobetasol (ULTRAVATE) 0.05 % ointment Apply  to affected area as needed. 0    metFORMIN (GLUCOPHAGE) 500 mg tablet Take 500 mg by mouth two (2) times daily (with meals).  [DISCONTINUED] semaglutide (Ozempic) 0.25 mg/0.2 mL (2 mg/1.5 mL) sub-q pen by SubCUTAneous route every seven (7) days.  (Patient not taking: Reported on 7/12/2022)       No current facility-administered medications on file prior to visit. Allergies   Allergen Reactions    Aspirin Nausea and Vomiting     Can take a coated aspirin.  Benicar [Olmesartan] Swelling     Pt states she is not allergic to this.  Ciprocinonide Swelling     Pt. States not allergic to this med.  Ciprofloxacin Swelling     Pt states she is not allergic to this.  Crestor [Rosuvastatin] Myalgia     Pt. Denies allergy .  Famotidine Itching     All other.  Sulfa (Sulfonamide Antibiotics) Hives     Pt. Denies allergy     Family History   Problem Relation Age of Onset    Cancer Maternal Grandfather         lung    Cancer Paternal Grandfather         colon    Heart Attack Mother     Dementia Father         alzheimers    Heart Disease Father     Kidney Disease Father     Dementia Maternal Grandmother         alzheimers    Breast Cancer Paternal [de-identified]         age unknown    Other Sister         hysterectomy    Other Brother         shingles    Stroke Sister         mini    Other Nephew         balance disorder     Social History     Socioeconomic History    Marital status: SINGLE   Tobacco Use    Smoking status: Never Smoker    Smokeless tobacco: Never Used   Vaping Use    Vaping Use: Never used   Substance and Sexual Activity    Alcohol use: Yes     Comment: occasional    Drug use: No    Sexual activity: Not Currently   Other Topics Concern     Service No    Blood Transfusions No    Caffeine Concern No    Occupational Exposure No    Hobby Hazards No    Sleep Concern Yes    Stress Concern Yes    Weight Concern Yes    Special Diet No    Back Care Yes     Comment: 3 back surgeries    Exercise No    Seat Belt Yes    Self-Exams Yes   Social History Narrative    Lives at home with brother. Review of Systems   Constitutional: Negative for chills, diaphoresis, fever, malaise/fatigue and weight loss.    Eyes: Negative for blurred vision, double vision, pain and redness. Respiratory: Positive for cough. Negative for shortness of breath and wheezing. Cardiovascular: Negative for chest pain, palpitations, orthopnea, claudication, leg swelling and PND. Genitourinary: Negative for frequency. Skin: Negative for itching and rash. Neurological: Positive for sensory change. Negative for dizziness, tingling, tremors, speech change, focal weakness, seizures, loss of consciousness, weakness and headaches. Endo/Heme/Allergies: Negative for environmental allergies and polydipsia. Does not bruise/bleed easily. Results for orders placed or performed in visit on 07/12/22   VITAMIN D, 25 HYDROXY   Result Value Ref Range    Vitamin D 25-Hydroxy 23.2 (L) 30 - 100 ng/mL   VITAMIN B12   Result Value Ref Range    Vitamin B12 692 193 - 758 pg/mL   METABOLIC PANEL, COMPREHENSIVE   Result Value Ref Range    Sodium 139 136 - 145 mmol/L    Potassium 4.7 3.5 - 5.1 mmol/L    Chloride 104 97 - 108 mmol/L    CO2 32 21 - 32 mmol/L    Anion gap 3 (L) 5 - 15 mmol/L    Glucose 207 (H) 65 - 100 mg/dL    BUN 16 6 - 20 MG/DL    Creatinine 1.09 (H) 0.55 - 1.02 MG/DL    BUN/Creatinine ratio 15 12 - 20      GFR est AA >60 >60 ml/min/1.73m2    GFR est non-AA 50 (L) >60 ml/min/1.73m2    Calcium 9.6 8.5 - 10.1 MG/DL    Bilirubin, total 0.3 0.2 - 1.0 MG/DL    ALT (SGPT) 22 12 - 78 U/L    AST (SGOT) 17 15 - 37 U/L    Alk.  phosphatase 163 (H) 45 - 117 U/L    Protein, total 7.6 6.4 - 8.2 g/dL    Albumin 3.8 3.5 - 5.0 g/dL    Globulin 3.8 2.0 - 4.0 g/dL    A-G Ratio 1.0 (L) 1.1 - 2.2     HEMOGLOBIN A1C WITH EAG   Result Value Ref Range    Hemoglobin A1c 8.6 (H) 4.0 - 5.6 %    Est. average glucose 200 mg/dL   LIPID PANEL   Result Value Ref Range    Cholesterol, total 174 <200 MG/DL    Triglyceride 100 <150 MG/DL    HDL Cholesterol 60 MG/DL    LDL, calculated 94 0 - 100 MG/DL    VLDL, calculated 20 MG/DL    CHOL/HDL Ratio 2.9 0.0 - 5.0     MICROALBUMIN, UR, RAND W/ MICROALB/CREAT RATIO   Result Value Ref Range    Microalbumin,urine random 4.16 MG/DL    Creatinine, urine 117.00 mg/dL    Microalbumin/Creat ratio (mg/g creat) 36 (H) 0 - 30 mg/g             Physical Exam  Visit Vitals  /80 (BP 1 Location: Left arm, BP Patient Position: Sitting, BP Cuff Size: Large adult long)   Pulse 93   Temp 97.3 °F (36.3 °C) (Temporal)   Resp 18   Ht 5' 6\" (1.676 m)   Wt 224 lb (101.6 kg)   LMP 09/23/2009 Comment: some spotting   SpO2 98%   BMI 36.15 kg/m²         Head: Normocephalic, without obvious abnormality, atraumatic  Eyes: conjunctivae/corneas clear. PERRL, EOM's intact. Neck: supple, symmetrical, trachea midline, no adenopathy, thyroid: not enlarged, symmetric, no tenderness/mass/nodules, no carotid bruit and no JVD  Lungs: clear to auscultation bilaterally  Heart: regular rate and rhythm, S1, S2 normal, no murmur, click, rub or gallop  Extremities: extremities normal, atraumatic, no cyanosis or edema  Pulses: 2+ and symmetric  Lymph nodes: Cervical, supraclavicular, and axillary nodes normal.  Neurologic: Grossly normal        ASSESSMENT and PLAN    ICD-10-CM ICD-9-CM    1. Controlled type 2 diabetes mellitus with diabetic nephropathy, with long-term current use of insulin ([facility])  P31.57 815.99 METABOLIC PANEL, COMPREHENSIVE    Z79.4 583.81 HEMOGLOBIN A1C WITH EAG     V58.67 MICROALBUMIN, UR, RAND W/ MICROALB/CREAT RATIO      METABOLIC PANEL, COMPREHENSIVE      HEMOGLOBIN A1C WITH EAG      MICROALBUMIN, UR, RAND W/ MICROALB/CREAT RATIO   2. Type 2 diabetes mellitus with nephropathy ([facility])  E11.21 250.40 CANCELED: HEMOGLOBIN A1C WITH EAG     583.81 CANCELED: METABOLIC PANEL, COMPREHENSIVE   3. Dyslipidemia, goal LDL below 100  E78.5 272.4 LIPID PANEL      LIPID PANEL      CANCELED: METABOLIC PANEL, COMPREHENSIVE      CANCELED: LIPID PANEL   4. Vitamin D deficiency  E55.9 268.9 VITAMIN D, 25 HYDROXY      VITAMIN D, 25 HYDROXY   5. Neuropathy  G62.9 355.9    6.  Tingling in extremities  R20.2 782.0 VITAMIN B12      VITAMIN B12   7. Gastroesophageal reflux disease without esophagitis  K21.9 530.81    8. Situational anxiety  F41.8 300.09    9. Severe obesity (BMI 35.0-35.9 with comorbidity) ([facility])  E66.01 278.01     Z68.35 V85.35    10. Constipation by delayed colonic transit  K59.01 564.01    11. ANMOL (obstructive sleep apnea)  G47.33 327.23    12. Allergic rhinitis due to pollen, unspecified seasonality  J30.1 477.0    13. OAB (overactive bladder)  N32.81 596.51 Myrbetriq 50 mg ER tablet   14. Essential hypertension  I10 401.9      Diagnoses and all orders for this visit:    1. Controlled type 2 diabetes mellitus with diabetic nephropathy, with long-term current use of insulin ([facility])  -     METABOLIC PANEL, COMPREHENSIVE; Future  -     HEMOGLOBIN A1C WITH EAG; Future  -     MICROALBUMIN, UR, RAND W/ MICROALB/CREAT RATIO; Future    2. Type 2 diabetes mellitus with nephropathy ([facility]    3. Dyslipidemia, goal LDL below 100  -     LIPID PANEL; Future    4. Vitamin D deficiency  -     VITAMIN D, 25 HYDROXY; Future    5. Neuropathy    6. Tingling in extremities  -     VITAMIN B12; Future    7. Gastroesophageal reflux disease without esophagitis    8. Situational anxiety    9. Severe obesity (BMI 35.0-35.9 with comorbidity) ([facility]    10. Constipation by delayed colonic transit    11. ANMOL (obstructive sleep apnea)    12. Allergic rhinitis due to pollen, unspecified seasonality    13. OAB (overactive bladder)    14. Essential hypertension      Follow-up and Dispositions    · Return in about 6 months (around 1/12/2023) for F/U HTN,CHOL,DM, GERD, osteoarthritis, F/U of obesity. lab results and schedule of future lab studies reviewed with patient  reviewed diet, exercise and weight control  cardiovascular risk and specific lipid/LDL goals reviewed  reviewed medications and side effects in detail  Please call my office if there are any questions- [phone].   I will arrange for follow up after the lab tests done from today return  Recommended a weekly \"heart check. \" I went into detail how to do this. Call for refills on medications as needed. Discussed expected course/resolution/complications of diagnosis in detail with patient. Medication risks/benefits/costs/interactions/alternatives discussed with patient. Pt was given an after visit summary which includes diagnoses, current medications & vitals. Pt expressed understanding with the diagnosis and plan    BMI is significantly elevated- in the obese range. I reviewed diet, exercise and weight control. Discussed weight control in detail, the importance of mainly decreased carbs, and for weight maintenance, exercise; discussed different diets and that it isn't as important to watch the type of foods as it is to decrease calorie intake no matter what type of diet you do, etc.       Total 60 minutes  re: Recommended a weekly \"heart check. \" I went into detail how to do this. Regular exercise is very important to your health; it helps mentally, physically, socially; it prevents injuries if done properly. Exercise, even as simple as walking 20-30 minutes daily has major benefits to your health even though your \"numbers\" are the same in the lab. See if you can add this into your daily regimen and after a few months it will become a regular habit-\"just something you do,\" like brushing your teeth. A combination of aerobic exercise and strengthening and stretching is felt to be the best for you, so this should be your ultimate goal.   This can be done in the privacy of your home or in a group setting as at the gym  Some prefer having a , others prefer to do exercise in groups or individually. Do what \"works\" for you. You need to make it simple and \"fun,\" or you most likely will not continue it. Reviewed BP, cholesterol and diabetic goals. LDL goal<100,HDL goal>45, Triglyceride goal<150, A1C< 7.0, BP<140/90.      Discussed specific diabetic recommendations: low cholesterol diet, weight control and daily exercise discussed, home glucose monitoring emphasized, all medications, side effects and compliance discussed carefully, foot care discussed and Podiatry visits discussed, annual eye examinations at Ophthalmology discussed, glycohemoglobin and other lab monitoring discussed and long term diabetic complications discussed. Reviewed symptoms, or lack thereof, of hypertension and elevated cholesterol. Review of  the proper technique of checking the blood pressure- check it on an average day only, not on a stressful day, sitting, no exercise for at least 1 hour and not experiencing any new pain( chronic pain is OK). Patient encouraged to check BP sitting and standing at least once a month and to report these readings to me if > 140/ 90 on average , or if the standing BP is >  15 points lower than the sitting. Always check it twice and if there is > 5 points decrease from the previous reading( top reading or systolic) keep checking it until it does not drop 5 points. Write only this final reading down, not the preceding readings. If out of these readings there is only 1 out of 4 or less > 510, or > 90 diastolic then your blood pressure is OK; it needs further treatment if it is above this. Also, don't forget,  as noted above, to check your blood pressure standing once a month; this is to detect a drop in your BP that might lead to fainting and serious injury; you check it standing with your arm hanging straight down and relaxed. Check it twice waiting 1 minute between the two readings. If, with either one of these 2 readings there is a > 15 point drop of the systolic compared to your sitting pressure( done before the standing BP), then let me know. Following these guidelines, continue to check your BP and write down only the ones described above and it will help me to effectively treat your blood pressure.  Also, discussed symptoms of concern that were noted today in the note above, treatment options( including doing nothing), when to follow up before recommended time frame. Also, answered all questions. Long discussion with pt about her concerns. Appears to have 4 major things going on. Number one is esophagitis with food getting hung up occasionally. Number two, she is having cough which may be related to be due to allergy. Number three, she is having tingling in her hands and feet that has been progressing and apparently, chiropractors talked to her about a very expensive treatment for that and I encouraged her to not do any treatment until she has seen neurologist. Number 4, she is having low back pain that is there either when she is standing up and gets worse after she stands for a minute or so. It does not bother her nearly as much when she is sitting (probably some DJD and possibly some spinal stenosis). I recommended she start Protonix for the esophagitis and associated reflux. I suggested 5 days for 3 different OTC antihistamines and stay on the one that works best for her allergy symptoms. For her low back pain, I gave her some back exercise sheet to do regularly twice a day. Back exercise sheet given with instructions to do 2 sets of 10 of each twice a day laying on a flat/ firm surface. Reviewed each of 5 exercises in detail showing the diagrams/pictures on the sheet and what movement was recommended for each one. For her knees, which are also bothering her and appear to be arthritis, cycling regularly was recommended. Early knee arthritis- need to cycle regularly( indoor exercise bike or outside biking are both effective), working your way up to 30 minutes 3 times a week and continue lifelong.  It is very important to put the seat up high enough that your leg is almost straight at the bottom of each stroke of the pedal.      This document was written by Cristina Clark, as dictated by Meredith Beck MD.   I have reviewed and agree with the above note and have made corrections where appropriate Henry Stern M.D.

## 2022-07-13 LAB
25(OH)D3 SERPL-MCNC: 23.2 NG/ML (ref 30–100)
ALBUMIN SERPL-MCNC: 3.8 G/DL (ref 3.5–5)
ALBUMIN/GLOB SERPL: 1 {RATIO} (ref 1.1–2.2)
ALP SERPL-CCNC: 163 U/L (ref 45–117)
ALT SERPL-CCNC: 22 U/L (ref 12–78)
ANION GAP SERPL CALC-SCNC: 3 MMOL/L (ref 5–15)
AST SERPL-CCNC: 17 U/L (ref 15–37)
BILIRUB SERPL-MCNC: 0.3 MG/DL (ref 0.2–1)
BUN SERPL-MCNC: 16 MG/DL (ref 6–20)
BUN/CREAT SERPL: 15 (ref 12–20)
CALCIUM SERPL-MCNC: 9.6 MG/DL (ref 8.5–10.1)
CHLORIDE SERPL-SCNC: 104 MMOL/L (ref 97–108)
CHOLEST SERPL-MCNC: 174 MG/DL
CO2 SERPL-SCNC: 32 MMOL/L (ref 21–32)
CREAT SERPL-MCNC: 1.09 MG/DL (ref 0.55–1.02)
CREAT UR-MCNC: 117 MG/DL
EST. AVERAGE GLUCOSE BLD GHB EST-MCNC: 200 MG/DL
GLOBULIN SER CALC-MCNC: 3.8 G/DL (ref 2–4)
GLUCOSE SERPL-MCNC: 207 MG/DL (ref 65–100)
HBA1C MFR BLD: 8.6 % (ref 4–5.6)
HDLC SERPL-MCNC: 60 MG/DL
HDLC SERPL: 2.9 {RATIO} (ref 0–5)
LDLC SERPL CALC-MCNC: 94 MG/DL (ref 0–100)
MICROALBUMIN UR-MCNC: 4.16 MG/DL
MICROALBUMIN/CREAT UR-RTO: 36 MG/G (ref 0–30)
POTASSIUM SERPL-SCNC: 4.7 MMOL/L (ref 3.5–5.1)
PROT SERPL-MCNC: 7.6 G/DL (ref 6.4–8.2)
SODIUM SERPL-SCNC: 139 MMOL/L (ref 136–145)
TRIGL SERPL-MCNC: 100 MG/DL (ref ?–150)
VIT B12 SERPL-MCNC: 692 PG/ML (ref 193–986)
VLDLC SERPL CALC-MCNC: 20 MG/DL

## 2023-02-15 NOTE — TELEPHONE ENCOUNTER
Patient call for refill BP medication. To [facility].  Thanks, Sebastian Guillaume    Last Visit: 7/12/22 MD Kristin Farmer  Next Appointment: None  Previous Refill Encounter(s): 3/31/22 90 + 1    Requested Prescriptions     Pending Prescriptions Disp Refills    amLODIPine (NORVASC) 5 mg tablet 90 Tablet 1     Sig: Take 1 Tablet by mouth daily. For Pharmacy Admin Tracking Only    Program: Medication Refill  CPA in place:   Recommendation Provided To:    Intervention Detail: New Rx: 1, reason: Patient Preference  Intervention Accepted By:   Reginald Anguiano Closed?:   Time Spent (min): 5

## 2023-02-15 NOTE — TELEPHONE ENCOUNTER
Left VM for pt to call office back. If pt calls back please let her know follow up appt has to be scheduled with Dr. Dinah Ross before prescription can be refilled. Pt last ov was 7/12/22. Please schedule pt next available. -TM 2/15/23.

## 2023-02-17 NOTE — TELEPHONE ENCOUNTER
Left VM for pt on 2/17/23 to callback the office. If pt calls back please let her know follow up appt has to be scheduled with Dr. Tahir Sanderson before prescription can be refilled. Pt last ov was 7/12/22. Please schedule pt next available. -VBN 2/17/23.

## 2023-02-20 NOTE — TELEPHONE ENCOUNTER
Left VM for pt on 2/20/23 to callback the office. If pt calls back please let her know follow up appt has to be scheduled with Dr. Lesly Strauss before prescription can be refilled. Pt last ov was 7/12/22. Please schedule pt next available. -VBN 2/20/23

## 2023-02-21 RX ORDER — AMLODIPINE BESYLATE 5 MG/1
5 TABLET ORAL DAILY
Qty: 90 TABLET | Refills: 1 | OUTPATIENT
Start: 2023-02-21

## 2023-02-23 NOTE — TELEPHONE ENCOUNTER
Patient call for refill amlodipine. mazin Bills    Last Visit: 7/12/22 MD Alfonso Ann  Next Appointment: None  Previous Refill Encounter(s): 3/31/22 90 + 1    Requested Prescriptions     Pending Prescriptions Disp Refills    amLODIPine (NORVASC) 5 mg tablet 90 Tablet 1     Sig: Take 1 Tablet by mouth daily. For Pharmacy Admin Tracking Only    Program: Medication Refill  CPA in place:   Recommendation Provided To:    Intervention Detail: New Rx: 1, reason: Patient Preference  Intervention Accepted By:   Marc Dowling Closed?:   Time Spent (min): 5

## 2023-02-24 NOTE — TELEPHONE ENCOUNTER
Left generic message on 2/24/23 to callback the office. When pt calls back please inform her that she need's to make an appt with ,before he will approve her Norvasc 5 mg.

## 2023-02-28 RX ORDER — AMLODIPINE BESYLATE 5 MG/1
5 TABLET ORAL DAILY
Qty: 90 TABLET | Refills: 1 | OUTPATIENT
Start: 2023-02-28

## 2023-03-10 ENCOUNTER — TELEPHONE (OUTPATIENT)
Dept: FAMILY MEDICINE CLINIC | Age: 73
End: 2023-03-10

## 2023-03-10 NOTE — TELEPHONE ENCOUNTER
LACY on 3.10.23 to callback the office per [place] pt need's a Diabetes Follow-up appt with  before getting her medication approved. Please schedule accordingly.

## 2023-03-10 NOTE — TELEPHONE ENCOUNTER
----- Message from Ace Guillaume sent at 3/10/2023  1:18 PM EST -----  Subject: Refill Request    QUESTIONS  Name of Medication? amLODIPine (NORVASC) 5 mg tablet  Patient-reported dosage and instructions? 5 mg tablet  How many days do you have left? 3  [id number]  Pharmacy phone number (if available)? [phone]  ---------------------------------------------------------------------------  --------------  CALL BACK INFO  What is the best way for the office to contact you? OK to leave message on   voicemail  Preferred Call Back Phone Number? [phone]  ---------------------------------------------------------------------------  --------------  SCRIPT ANSWERS  Relationship to Patient?  Self

## 2023-03-15 DIAGNOSIS — E78.01 FAMILIAL HYPERCHOLESTEROLEMIA: ICD-10-CM

## 2023-03-15 RX ORDER — ATORVASTATIN CALCIUM 40 MG/1
40 TABLET, FILM COATED ORAL DAILY
Qty: 90 TABLET | Refills: 0 | Status: SHIPPED | OUTPATIENT
Start: 2023-03-15

## 2023-03-15 RX ORDER — AMLODIPINE BESYLATE 5 MG/1
5 TABLET ORAL DAILY
Qty: 90 TABLET | Refills: 0 | Status: SHIPPED | OUTPATIENT
Start: 2023-03-15

## 2023-03-15 NOTE — TELEPHONE ENCOUNTER
----- Message from Faisla Méndez sent at 3/15/2023 11:04 AM EDT -----  Subject: Refill Request    QUESTIONS  Name of Medication? amLODIPine (NORVASC) 5 mg tablet  Patient-reported dosage and instructions? 1 TAB A DAY  How many days do you have left? 0  Preferred Pharmacy? [id number]  Pharmacy phone number (if available)? [phone]  Additional Information for Provider? PT NEEDS A REFILL ASAP HAS MADE AN   APPT FOR 3-  ---------------------------------------------------------------------------  --------------,  Name of Medication? atorvastatin (LIPITOR) 40 mg tablet  Patient-reported dosage and instructions? 1 TAB A DAY  How many days do you have left? 20  Preferred Pharmacy? [id number]  Pharmacy phone number (if available)? [phone]  Additional Information for Provider? PT NEEDS A REFILL HAS MADE AN APPT   FOR 3-  ---------------------------------------------------------------------------  --------------  CALL BACK INFO  What is the best way for the office to contact you? OK to leave message on   voicemail  Preferred Call Back Phone Number? [phone]  ---------------------------------------------------------------------------  --------------  SCRIPT ANSWERS  Relationship to Patient?  Self

## 2023-03-15 NOTE — TELEPHONE ENCOUNTER
Pt left a voice message requesting refills. Pt stated is out of the Norvasc. [phone] [id number]    Last visit 07/12/2022 MD Nathaniel Morel   Next appointment 03/20/2023 MD Nathaniel Morel   Previous refill encounter(s)   03/31/2022:  - Lipitor #90 with 1 refill,   - Norvasc #90 with 1 refill. For Pharmacy Admin Tracking Only    Program: Medication Refill  Intervention Detail: New Rx: 2, reason: Patient Preference  Time Spent (min): 5      Requested Prescriptions     Pending Prescriptions Disp Refills    amLODIPine (NORVASC) 5 mg tablet 90 Tablet 0     Sig: Take 1 Tablet by mouth daily. atorvastatin (LIPITOR) 40 mg tablet 90 Tablet 0     Sig: Take 1 Tablet by mouth daily.

## 2023-03-20 ENCOUNTER — VIRTUAL VISIT (OUTPATIENT)
Dept: FAMILY MEDICINE CLINIC | Age: 73
End: 2023-03-20
Payer: COMMERCIAL

## 2023-03-20 DIAGNOSIS — K21.9 GASTROESOPHAGEAL REFLUX DISEASE WITHOUT ESOPHAGITIS: ICD-10-CM

## 2023-03-20 DIAGNOSIS — N32.81 OAB (OVERACTIVE BLADDER): ICD-10-CM

## 2023-03-20 DIAGNOSIS — Z00.00 MEDICARE ANNUAL WELLNESS VISIT, SUBSEQUENT: ICD-10-CM

## 2023-03-20 DIAGNOSIS — M47.812 CERVICAL SPONDYLOSIS WITHOUT MYELOPATHY: ICD-10-CM

## 2023-03-20 DIAGNOSIS — K59.01 CONSTIPATION BY DELAYED COLONIC TRANSIT: ICD-10-CM

## 2023-03-20 DIAGNOSIS — Z71.89 ACP (ADVANCE CARE PLANNING): ICD-10-CM

## 2023-03-20 DIAGNOSIS — E55.9 VITAMIN D DEFICIENCY: ICD-10-CM

## 2023-03-20 DIAGNOSIS — E11.21 TYPE 2 DIABETES MELLITUS WITH NEPHROPATHY (HCC): ICD-10-CM

## 2023-03-20 DIAGNOSIS — F41.8 SITUATIONAL ANXIETY: ICD-10-CM

## 2023-03-20 DIAGNOSIS — Z79.4 CONTROLLED TYPE 2 DIABETES MELLITUS WITH DIABETIC NEPHROPATHY, WITH LONG-TERM CURRENT USE OF INSULIN (HCC): ICD-10-CM

## 2023-03-20 DIAGNOSIS — I10 ESSENTIAL HYPERTENSION: ICD-10-CM

## 2023-03-20 DIAGNOSIS — G47.33 OSA (OBSTRUCTIVE SLEEP APNEA): ICD-10-CM

## 2023-03-20 DIAGNOSIS — E11.21 CONTROLLED TYPE 2 DIABETES MELLITUS WITH DIABETIC NEPHROPATHY, WITH LONG-TERM CURRENT USE OF INSULIN (HCC): ICD-10-CM

## 2023-03-20 DIAGNOSIS — E66.01 SEVERE OBESITY (BMI 35.0-35.9 WITH COMORBIDITY) (HCC): ICD-10-CM

## 2023-03-20 DIAGNOSIS — G62.9 NEUROPATHY: ICD-10-CM

## 2023-03-20 DIAGNOSIS — E78.5 DYSLIPIDEMIA, GOAL LDL BELOW 100: Primary | ICD-10-CM

## 2023-03-20 PROCEDURE — 1090F PRES/ABSN URINE INCON ASSESS: CPT | Performed by: FAMILY MEDICINE

## 2023-03-20 PROCEDURE — G8536 NO DOC ELDER MAL SCRN: HCPCS | Performed by: FAMILY MEDICINE

## 2023-03-20 PROCEDURE — G9899 SCRN MAM PERF RSLTS DOC: HCPCS | Performed by: FAMILY MEDICINE

## 2023-03-20 PROCEDURE — 1123F ACP DISCUSS/DSCN MKR DOCD: CPT | Performed by: FAMILY MEDICINE

## 2023-03-20 PROCEDURE — 99214 OFFICE O/P EST MOD 30 MIN: CPT | Performed by: FAMILY MEDICINE

## 2023-03-20 PROCEDURE — 2022F DILAT RTA XM EVC RTNOPTHY: CPT | Performed by: FAMILY MEDICINE

## 2023-03-20 PROCEDURE — G0439 PPPS, SUBSEQ VISIT: HCPCS | Performed by: FAMILY MEDICINE

## 2023-03-20 PROCEDURE — 3017F COLORECTAL CA SCREEN DOC REV: CPT | Performed by: FAMILY MEDICINE

## 2023-03-20 PROCEDURE — 3046F HEMOGLOBIN A1C LEVEL >9.0%: CPT | Performed by: FAMILY MEDICINE

## 2023-03-20 PROCEDURE — 1101F PT FALLS ASSESS-DOCD LE1/YR: CPT | Performed by: FAMILY MEDICINE

## 2023-03-20 PROCEDURE — G8399 PT W/DXA RESULTS DOCUMENT: HCPCS | Performed by: FAMILY MEDICINE

## 2023-03-20 PROCEDURE — G8432 DEP SCR NOT DOC, RNG: HCPCS | Performed by: FAMILY MEDICINE

## 2023-03-20 PROCEDURE — G8417 CALC BMI ABV UP PARAM F/U: HCPCS | Performed by: FAMILY MEDICINE

## 2023-03-20 PROCEDURE — G8427 DOCREV CUR MEDS BY ELIG CLIN: HCPCS | Performed by: FAMILY MEDICINE

## 2023-03-20 RX ORDER — DICLOFENAC SODIUM 75 MG/1
75 TABLET, DELAYED RELEASE ORAL
COMMUNITY
Start: 2023-03-09

## 2023-03-20 RX ORDER — SEMAGLUTIDE 1.34 MG/ML
INJECTION, SOLUTION SUBCUTANEOUS
COMMUNITY

## 2023-03-20 RX ORDER — TOLTERODINE 4 MG/1
4 CAPSULE, EXTENDED RELEASE ORAL DAILY
COMMUNITY
Start: 2023-02-22

## 2023-03-20 NOTE — PATIENT INSTRUCTIONS
Medicare Wellness Visit, Female     The best way to live healthy is to have a lifestyle where you eat a well-balanced diet, exercise regularly, limit alcohol use, and quit all forms of tobacco/nicotine, if applicable. Regular preventive services are another way to keep healthy. Preventive services (vaccines, screening tests, monitoring & exams) can help personalize your care plan, which helps you manage your own care. Screening tests can find health problems at the earliest stages, when they are easiest to treat. [facility] follows the current, evidence-based guidelines published by the [facility] Durga Adrian ([facility]) when recommending preventive services for our patients. Because we follow these guidelines, sometimes recommendations change over time as research supports it. (For example, mammograms used to be recommended annually. Even though Medicare will still pay for an annual mammogram, the newer guidelines recommend a mammogram every two years for women of average risk). Of course, you and your doctor may decide to screen more often for some diseases, based on your risk and your co-morbidities (chronic disease you are already diagnosed with). Preventive services for you include:  - Medicare offers their members a free annual wellness visit, which is time for you and your primary care provider to discuss and plan for your preventive service needs.  Take advantage of this benefit every year!    -Over the age of 72 should receive the recommended pneumonia vaccines.    -All adults should have a flu vaccine yearly.  -All adults should have a tetanus vaccine every 10 years.   -Over the age 48 should receive the shingles vaccines.        -All adults should be screened once for Hepatitis C.  -All adults age 38-68 who are overweight should have a diabetes screening test once every three years.   -Other screening tests and preventive services for persons with diabetes include: an eye exam to screen for diabetic retinopathy, a kidney function test, a foot exam, and stricter control over your cholesterol.   -Cardiovascular screening for adults with routine risk involves an electrocardiogram (ECG) at intervals determined by your doctor.     -Colorectal cancer screenings should be done for adults age 39-70 with no increased risk factors for colorectal cancer. There are a number of acceptable methods of screening for this type of cancer. Each test has its own benefits and drawbacks. Discuss with your doctor what is most appropriate for you during your annual wellness visit. The different tests include: colonoscopy (considered the best screening method), a fecal occult blood test, a fecal DNA test, and sigmoidoscopy.    -Lung cancer screening is recommended annually with a low dose CT scan for adults between age 54 and 68, who have smoked at least 30 pack years (equivalent of 1 pack per day for 30 days), and who is a current smoker or quit less than 15 years ago.    -A bone mass density test is recommended when a woman turns 65 to screen for osteoporosis. This test is only recommended one time, as a screening. Some providers will use this same test as a disease monitoring tool if you already have osteoporosis. -Breast cancer screenings are recommended every other year for women of normal risk, age 54-69.    -Cervical cancer screenings for women over age 72 are only recommended with certain risk factors.      Here is a list of your current Health Maintenance items (your personalized list of preventive services) with a due date:  Health Maintenance Due   Topic Date Due    Shingles Vaccine (1 of 2) Never done    Depresssion Screening  01/04/2023    Colorectal Screening  02/27/2023

## 2023-03-20 NOTE — PROGRESS NOTES
HISTORY OF PRESENT ILLNESS  HPI  Benny Pereira is a 71 y.o. female with history of HTN, DM-II, hyperlipidemia with LDL goal < 100, and vitamin D deficiency. Pt is seen through virtual visit today for f/u of these health problems. Pt checks her BP outside the office, but not recently since getting a new BP cuff. She checks her glucose. Pt plans on getting back on constant glucose monitor since she has changed insurances. Pt checks her glucose a couple of times a day. She checks her glucose in the morning when she gets up and before meals. Her glucose averages around 124-125 in the morning and 140 in the evening before supper. Pt uses 12-14 units of Novolog with breakfast or lunch and 14-18 with supper. She uses 60 units of Toujeo at bed time. She has had some hypoglycemic symptoms with readings such as 77 when checked in the evening between lunch and supper. This comes on typically after she goes on a walk. She had an A1C of 8.2 a couple of days ago after seeing endocrinologist, Dr. Maty Baltazar. Pt recalls her A1C was around 7 before norma last year. Pt denies unusual SOB, chest pain, and any recent ER visits or hospitalizations. Benny Pereira, who was evaluated through a synchronous (real-time) audio-video encounter, and/or her healthcare decision maker, is aware that it is a billable service, which includes applicable co-pays, with coverage as determined by her insurance carrier. She provided verbal consent to proceed and patient identification was verified. This visit was conducted pursuant to the emergency declaration under the [facility], [address] [place] [address] and the [employer] Act. A caregiver was present when appropriate. Ability to conduct physical exam was limited. 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    DDD (degenerative disc disease)     thoracic and cervical spine    Diverticulosis     hx diverticulosis    Dyslipidemia, goal LDL below 100 9/29/2017    Eczema     Glaucoma     glaucoma    Hiatal hernia     hiatal hernia    Hyperlipidemia     Hypertension     Ill-defined condition     bulging disc/pinched nerve low back Having surgery CJW 1/22/19    Ill-defined condition     overweight    Lichen sclerosus     dx at [facility]    Menopause     LMP-unknown    ANMOL (obstructive sleep apnea)     CPAP - does not use regularly    Reflux esophagitis     Situational anxiety 9/29/2017    Stomach ulcer     Type II or unspecified type diabetes mellitus without mention of complication, not stated as uncontrolled      Past Surgical History:   Procedure Laterality Date    COLONOSCOPY N/A 2/27/2018    COLONOSCOPY performed by Elizabeth Pope MD at [address]      x 3 - total of 3 back surgeries as of 2/26/2018/5 back surgeries as of 6/17/2020    HX COLONOSCOPY  2013    HX DILATION AND CURETTAGE      x 3 for abnormal bleeding    HX GYN      tumors removed from uterus - had cancer cells    HX HEENT  2002    sinus surgery for snoring & partial Uvelectomy    HX HIP REPLACEMENT  2004    rt    HX ORTHOPAEDIC Left 07/2013    hip replacement    HX TONSIL AND ADENOIDECTOMY      pt does not think her adenoids were removed    HX TUBAL LIGATION      SC DCMPRN PX PERQ NUCLEUS PULPOSUS 1/MLT LVL LUMBAR  2003    L3-4    SC PALATOPHARYNGOPLASTY  2002    SC TOTAL ABDOMINAL HYSTERECT W/WO RMVL TUBE OVARY  2 years ago, per pt.      Current Outpatient Medications on File Prior to Visit Medication Sig Dispense Refill    diclofenac EC (VOLTAREN) 75 mg EC tablet 75 mg. amLODIPine (NORVASC) 5 mg tablet Take 1 Tablet by mouth daily. 90 Tablet 0    atorvastatin (LIPITOR) 40 mg tablet Take 1 Tablet by mouth daily. 90 Tablet 0    insulin aspart (NOVOLOG FLEXPEN U-100 INSULIN SC) 16-20 Units by SubCUTAneous route daily (with dinner). latanoprost (XALATAN) 0.005 % ophthalmic solution Administer 1 Drop to both eyes nightly.  0    insulin glargine U-300 conc (TOUJEO) 300 unit/mL (1.5 mL) inpn pen 64 Units by SubCUTAneous route nightly. polyethylene glycol (MIRALAX) 17 gram/dose powder Take 17 g by mouth nightly as needed. ONETOUCH ULTRA TEST strip   0    halobetasol (ULTRAVATE) 0.05 % ointment Apply  to affected area as needed. 0    tolterodine ER (DETROL LA) 4 mg ER capsule Take 4 mg by mouth daily. semaglutide (Ozempic) 1 mg/dose (4 mg/3 mL) pnij as directed inject 1.0mg Subcutaneous once a week, dispense 3 boxes for 90-day      [DISCONTINUED] Myrbetriq 50 mg ER tablet 50 mg. (Patient not taking: Reported on 3/20/2023)      CHOLECALCIFEROL, VITAMIN D3, (VITAMIN D3 PO) Take 3,000 Units by mouth daily. BD INSULIN PEN NEEDLE UF MINI 31 gauge x 3/16\" ndle use four times a day for INSULIN INJECTIONS as directed  1    [DISCONTINUED] metFORMIN (GLUCOPHAGE) 500 mg tablet Take 500 mg by mouth two (2) times daily (with meals). (Patient not taking: Reported on 3/20/2023)       No current facility-administered medications on file prior to visit. Allergies   Allergen Reactions    Aspirin Nausea and Vomiting     Can take a coated aspirin. Benicar [Olmesartan] Swelling     Pt states she is not allergic to this. Ciprocinonide Swelling     Pt. States not allergic to this med. Ciprofloxacin Swelling     Pt states she is not allergic to this. Crestor [Rosuvastatin] Myalgia     Pt. Denies allergy . Famotidine Itching     All other.     Sulfa (Sulfonamide Antibiotics) Hives Pt. Denies allergy     Family History   Problem Relation Age of Onset    Cancer Maternal Grandfather         lung    Cancer Paternal Grandfather         colon    Heart Attack Mother     Dementia Father         alzheimers    Heart Disease Father     Kidney Disease Father     Dementia Maternal Grandmother         alzheimers    Breast Cancer Paternal Aunt         age unknown    Other Sister         hysterectomy    Other Brother         shingles    Stroke Sister         mini    Other Nephew         balance disorder     Social History     Socioeconomic History    Marital status: SINGLE   Tobacco Use    Smoking status: Never    Smokeless tobacco: Never   Vaping Use    Vaping Use: Never used   Substance and Sexual Activity    Alcohol use: Yes     Comment: occasional    Drug use: No    Sexual activity: Not Currently   Other Topics Concern     Service No    Blood Transfusions No    Caffeine Concern No    Occupational Exposure No    Hobby Hazards No    Sleep Concern Yes    Stress Concern Yes    Weight Concern Yes    Special Diet No    Back Care Yes     Comment: 3 back surgeries    Exercise No    Seat Belt Yes    Self-Exams Yes   Social History Narrative    Lives at home with brother. Review of Systems   Constitutional:  Negative for chills, diaphoresis, fever, malaise/fatigue and weight loss. Eyes:  Negative for blurred vision, double vision, pain and redness. Respiratory:  Negative for cough, shortness of breath and wheezing. Cardiovascular:  Negative for chest pain, palpitations, orthopnea, claudication, leg swelling and PND. Genitourinary:  Negative for frequency. Skin:  Negative for itching and rash. Neurological:  Negative for dizziness, tingling, tremors, sensory change, speech change, focal weakness, seizures, loss of consciousness, weakness and headaches. Endo/Heme/Allergies:  Negative for environmental allergies and polydipsia. Does not bruise/bleed easily.    Results for orders placed or performed in visit on 07/12/22   VITAMIN D, 25 HYDROXY   Result Value Ref Range    Vitamin D 25-Hydroxy 23.2 (L) 30 - 100 ng/mL   VITAMIN B12   Result Value Ref Range    Vitamin B12 692 193 - 897 pg/mL   METABOLIC PANEL, COMPREHENSIVE   Result Value Ref Range    Sodium 139 136 - 145 mmol/L    Potassium 4.7 3.5 - 5.1 mmol/L    Chloride 104 97 - 108 mmol/L    CO2 32 21 - 32 mmol/L    Anion gap 3 (L) 5 - 15 mmol/L    Glucose 207 (H) 65 - 100 mg/dL    BUN 16 6 - 20 MG/DL    Creatinine 1.09 (H) 0.55 - 1.02 MG/DL    BUN/Creatinine ratio 15 12 - 20      GFR est AA >60 >60 ml/min/1.73m2    GFR est non-AA 50 (L) >60 ml/min/1.73m2    Calcium 9.6 8.5 - 10.1 MG/DL    Bilirubin, total 0.3 0.2 - 1.0 MG/DL    ALT (SGPT) 22 12 - 78 U/L    AST (SGOT) 17 15 - 37 U/L    Alk.  phosphatase 163 (H) 45 - 117 U/L    Protein, total 7.6 6.4 - 8.2 g/dL    Albumin 3.8 3.5 - 5.0 g/dL    Globulin 3.8 2.0 - 4.0 g/dL    A-G Ratio 1.0 (L) 1.1 - 2.2     HEMOGLOBIN A1C WITH EAG   Result Value Ref Range    Hemoglobin A1c 8.6 (H) 4.0 - 5.6 %    Est. average glucose 200 mg/dL   LIPID PANEL   Result Value Ref Range    Cholesterol, total 174 <200 MG/DL    Triglyceride 100 <150 MG/DL    HDL Cholesterol 60 MG/DL    LDL, calculated 94 0 - 100 MG/DL    VLDL, calculated 20 MG/DL    CHOL/HDL Ratio 2.9 0.0 - 5.0     MICROALBUMIN, UR, RAND W/ MICROALB/CREAT RATIO   Result Value Ref Range    Microalbumin,urine random 4.16 MG/DL    Creatinine, urine random 117.00 mg/dL    Microalbumin/Creat ratio (mg/g creat) 36 (H) 0 - 30 mg/g         Physical Exam  Visit Vitals  LMP 09/23/2009 Comment: some spotting       General: alert, cooperative, no distress   Mental  status: normal mood, behavior, speech, dress, motor activity, and thought processes, able to follow commands   HENT: NCAT   Neck: no visualized mass   Resp: no respiratory distress   Neuro: no gross deficits   Skin: no discoloration or lesions of concern on visible areas   Psychiatric: normal affect, consistent with stated mood, no evidence of hallucinations          ASSESSMENT and PLAN    ICD-10-CM ICD-9-CM    1. Dyslipidemia, goal LDL below 100  E78.5 272.4       2. Vitamin D deficiency  E55.9 268.9       3. Gastroesophageal reflux disease without esophagitis  K21.9 530.81       4. Constipation by delayed colonic transit  K59.01 564.01       5. Type 2 diabetes mellitus with nephropathy ([facility])  E11.21 250.40      583.81       6. Neuropathy  G62.9 355.9       7. Situational anxiety  F41.8 300.09       8. Severe obesity (BMI 35.0-35.9 with comorbidity) ([facility])  E66.01 278.01     Z68.35 V85.35       9. ANMOL (obstructive sleep apnea)  G47.33 327.23       10. Controlled type 2 diabetes mellitus with diabetic nephropathy, with long-term current use of insulin ([facility])  E11.21 250.40     Z79.4 583.81      V58.67       11. OAB (overactive bladder)  N32.81 596.51       12. Essential hypertension  I10 401.9       13. Cervical spondylosis without myelopathy  M47.812 721.0       14. ACP (advance care planning)  Z71.89 V65.49       15. Medicare annual wellness visit, subsequent  Z00.00 V70.0         Diagnoses and all orders for this visit:    1. Dyslipidemia, goal LDL below 100    2. Vitamin D deficiency    3. Gastroesophageal reflux disease without esophagitis    4. Constipation by delayed colonic transit    5. Type 2 diabetes mellitus with nephropathy ([facility]    6. Neuropathy    7. Situational anxiety    8. Severe obesity (BMI 35.0-35.9 with comorbidity) ([address]    9. ANMOL (obstructive sleep apnea)    10. Controlled type 2 diabetes mellitus with diabetic nephropathy, with long-term current use of insulin ([facility]    11. OAB (overactive bladder)    12. Essential hypertension    13. Cervical spondylosis without myelopathy    14. ACP (advance care planning)    15.  Medicare annual wellness visit, subsequent    Follow-up and Dispositions    Return in about 3 months (around 6/20/2023) for F/U HTN,CHOL,DM, osteoarthritis, GERD, f/u Vitamin D deficiency. lab results and schedule of future lab studies reviewed with patient  reviewed diet, exercise and weight control  cardiovascular risk and specific lipid/LDL goals reviewed  reviewed medications and side effects in detail  Please call my office if there are any questions- [phone]. I will arrange for follow up after the lab tests done from today return  Recommended a weekly \"heart check. \" I went into detail how to do this. Call for refills on medications as needed. Discussed expected course/resolution/complications of diagnosis in detail with patient. Medication risks/benefits/costs/interactions/alternatives discussed with patient. Pt was given an after visit summary which includes diagnoses, current medications & vitals. Pt expressed understanding with the diagnosis and plan    BMI is significantly elevated- in the obese range. I reviewed diet, exercise and weight control. Discussed weight control in detail, the importance of mainly decreased carbs, and for weight maintenance, exercise; discussed different diets and that it isn't as important to watch the type of foods as it is to decrease calorie intake no matter what type of diet you do, etc.  Mindful eating also discussed- Naturally Slim( now [employer]) or Noom are 2 options. Total 30 minutes  re: Discussed specific diabetic recommendations: low cholesterol diet, weight control and daily exercise discussed, home glucose monitoring emphasized, all medications, side effects and compliance discussed carefully, foot care discussed and Podiatry visits discussed, annual eye examinations at Ophthalmology discussed, glycohemoglobin and other lab monitoring discussed and long term diabetic complications discussed. Reviewed BP, cholesterol and diabetic goals. LDL goal<100,HDL goal>45, Triglyceride goal<150, A1C< 7.0, BP<140/90.    Review of  the proper technique of checking the blood pressure- check it on an average day only, not on a stressful day, sitting, no exercise for at least 1 hour and not experiencing any new pain( chronic pain is OK). Patient encouraged to check BP sitting and standing at least once a month and to report these readings to me if > 140/ 90 on average , or if the standing BP is >  15 points lower than the sitting. Always check it twice and if there is > 5 points decrease from the previous reading( top reading or systolic) keep checking it until it does not drop 5 points. Write only this final reading down, not the preceding readings. If out of these readings there is only 1 out of 4 or less > 985, or > 90 diastolic then your blood pressure is OK; it needs further treatment if it is above this. Also, don't forget,  as noted above, to check your blood pressure standing once a month; this is to detect a drop in your BP that might lead to fainting and serious injury; you check it standing with your arm hanging straight down and relaxed. Check it twice waiting 1 minute between the two readings. If, with either one of these 2 readings there is a > 15 point drop of the systolic compared to your sitting pressure( done before the standing BP), then let me know. Following these guidelines, continue to check your BP and write down only the ones described above and it will help me to effectively treat your blood pressure. Reviewed symptoms, or lack thereof, of hypertension and elevated cholesterol. Regular exercise is very important to your health; it helps mentally, physically, socially; it prevents injuries if done properly. Exercise, even as simple as walking 20-30 minutes daily has major benefits to your health even though your \"numbers\" are the same in the lab. See if you can add this into your daily regimen and after a few months it will become a regular habit-\"just something you do,\" like brushing your teeth.      A combination of aerobic exercise and strengthening and stretching is felt to be the best for you, so this should be your ultimate goal.   This can be done in the privacy of your home or in a group setting as at the gym  Some prefer having a , others prefer to do exercise in groups or individually. Do what \"works\" for you. You need to make it simple and \"fun,\" or you most likely will not continue it. Recommended a weekly \"heart check. \" I went into detail how to do this. Also, discussed symptoms of concern that were noted today in the note above, treatment options( including doing nothing), when to follow up before recommended time frame. Also, answered all questions. She will come into do her lab work. Since she is seeing endocrinologist, we would not normally do the lab work, but we are not getting the lab results- no general labs except the A1C since May of 2020. This document was written by Toy Harris, as dictated by Madeleine Bueno MD.   I have reviewed and agree with the above note and have made corrections where appropriate Mark C. Tura Cowden M.D. This is the Subsequent Medicare Annual Wellness Exam, performed 12 months or more after the Initial AWV or the last Subsequent AWV    I have reviewed the patient's medical history in detail and updated the computerized patient record. Assessment/Plan   Education and counseling provided:  Are appropriate based on today's review and evaluation    1. Dyslipidemia, goal LDL below 100  2. Vitamin D deficiency  3. Gastroesophageal reflux disease without esophagitis  4. Constipation by delayed colonic transit  5. Type 2 diabetes mellitus with nephropathy ([address]  6. Neuropathy  7. Situational anxiety  8. Severe obesity (BMI 35.0-35.9 with comorbidity) ([address]  9. ANMOL (obstructive sleep apnea)  10. Controlled type 2 diabetes mellitus with diabetic nephropathy, with long-term current use of insulin ([address]  11. OAB (overactive bladder)  12. Essential hypertension  13. Cervical spondylosis without myelopathy  14. ACP (advance care planning)  15.  Medicare annual wellness visit, subsequent       Depression Risk Factor Screening     3 most recent PHQ Screens 1/4/2022   Little interest or pleasure in doing things Not at all   Feeling down, depressed, irritable, or hopeless Not at all   Total Score PHQ 2 0       Alcohol & Drug Abuse Risk Screen    Do you average more than 1 drink per night or more than 7 drinks a week:  No    On any one occasion in the past three months have you have had more than 3 drinks containing alcohol:  No          Functional Ability and Level of Safety    Hearing: Hearing is good. Activities of Daily Living: The home contains: no safety equipment. Patient does total self care      Ambulation: with no difficulty     Fall Risk:  Fall Risk Assessment, last 12 mths 7/12/2022   Able to walk? Yes   Fall in past 12 months? 0   Do you feel unsteady? 0   Are you worried about falling 0   Number of falls in past 12 months -   Fall with injury? -      Abuse Screen:  Patient is not abused       Cognitive Screening    Has your family/caregiver stated any concerns about your memory: no     Cognitive Screening: Normal - Mini Cog Test  I reviewed advanced directive information and written information given to patient; patient to make follow up appointment with a NN for any questions,to review choices made for health care, agent, and anatomical gifts that are on the advanced directive at home.      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    Reflux esophagitis K21.00    Degeneration of intervertebral disc, site unspecified [id number]    Cervical spondylosis without myelopathy M47.812    Contact dermatitis and other eczema, due to unspecified cause L25.9    Allergic rhinitis J30.9    Vitamin D deficiency M09.2    Lichen sclerosus M35.4    Hypertension I10    ACP (advance care planning) Z71.89    Type 2 diabetes mellitus without complication, with long-term current use of insulin (HCC) E11.9, Z79.4    Situational anxiety F41.8    Dyslipidemia, goal LDL below 100 E78.5    Type 2 diabetes mellitus with nephropathy (HCC) E11.21    Colon adenoma D12.6    Postmenopausal bleeding N95.0    History of glaucoma Z86.69    Gastroesophageal reflux disease without esophagitis K21.9     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    DDD (degenerative disc disease)     thoracic and cervical spine    Diverticulosis     hx diverticulosis    Dyslipidemia, goal LDL below 100 9/29/2017    Eczema     Glaucoma     glaucoma    Hiatal hernia     hiatal hernia    Hyperlipidemia     Hypertension     Ill-defined condition     bulging disc/pinched nerve low back Having surgery CJW 1/22/19    Ill-defined condition     overweight    Lichen sclerosus     dx at [facility]    Menopause     LMP-unknown    ANMOL (obstructive sleep apnea)     CPAP - does not use regularly    Reflux esophagitis     Situational anxiety 9/29/2017    Stomach ulcer     Type II or unspecified type diabetes mellitus without mention of complication, not stated as uncontrolled       Past Surgical History:   Procedure Laterality Date    COLONOSCOPY N/A 2/27/2018    [facility]    HX BACK SURGERY      x 3 - total of 3 back surgeries as of 2/26/2018/5 back surgeries as of 6/17/2020    HX COLONOSCOPY  2013    HX DILATION AND CURETTAGE      x 3 for abnormal bleeding    HX GYN      tumors removed from uterus - had cancer cells    HX HEENT  2002    sinus surgery for snoring & partial Uvelectomy    HX HIP REPLACEMENT  2004    rt    HX ORTHOPAEDIC Left 07/2013    hip replacement    HX TONSIL AND ADENOIDECTOMY      pt does not think her adenoids were removed    HX TUBAL LIGATION      AL DCMPRN PX PERQ NUCLEUS PULPOSUS 1/MLT LVL LUMBAR  2003    L3-4    AL PALATOPHARYNGOPLASTY  2002    AL TOTAL ABDOMINAL HYSTERECT W/WO RMVL TUBE OVARY  2 years ago, per pt. Current Outpatient Medications   Medication Sig Dispense Refill    diclofenac EC (VOLTAREN) 75 mg EC tablet 75 mg. amLODIPine (NORVASC) 5 mg tablet Take 1 Tablet by mouth daily. 90 Tablet 0    atorvastatin (LIPITOR) 40 mg tablet Take 1 Tablet by mouth daily. 90 Tablet 0    insulin aspart (NOVOLOG FLEXPEN U-100 INSULIN SC) 16-20 Units by SubCUTAneous route daily (with dinner). latanoprost (XALATAN) 0.005 % ophthalmic solution Administer 1 Drop to both eyes nightly.  0    insulin glargine U-300 conc (TOUJEO) 300 unit/mL (1.5 mL) inpn pen 64 Units by SubCUTAneous route nightly. polyethylene glycol (MIRALAX) 17 gram/dose powder Take 17 g by mouth nightly as needed. ONETOUCH ULTRA TEST strip   0    halobetasol (ULTRAVATE) 0.05 % ointment Apply  to affected area as needed. 0    tolterodine ER (DETROL LA) 4 mg ER capsule Take 4 mg by mouth daily. semaglutide (Ozempic) 1 mg/dose (4 mg/3 mL) pnij as directed inject 1.0mg Subcutaneous once a week, dispense 3 boxes for 90-day      CHOLECALCIFEROL, VITAMIN D3, (VITAMIN D3 PO) Take 3,000 Units by mouth daily. BD INSULIN PEN NEEDLE UF MINI 31 gauge x 3/16\" ndle use four times a day for INSULIN INJECTIONS as directed  1     Allergies   Allergen Reactions    Aspirin Nausea and Vomiting     Can take a coated aspirin.     Benicar [Olmesartan] Swelling     Pt states she is not allergic to this.    Ciprocinonide Swelling     Pt. States not allergic to this med. Ciprofloxacin Swelling     Pt states she is not allergic to this. Crestor [Rosuvastatin] Myalgia     Pt. Denies allergy . Famotidine Itching     All other. Sulfa (Sulfonamide Antibiotics) Hives     Pt.  Denies allergy       Family History   Problem Relation Age of Onset    Cancer Maternal Grandfather         lung    Cancer Paternal Grandfather         colon    Heart Attack Mother     Dementia Father         alzheimers    Heart Disease Father     Kidney Disease Father     Dementia Maternal Grandmother         alzheimers    Breast Cancer Paternal Aunt         age unknown    Other Sister         hysterectomy    Other Brother         shingles    Stroke Sister         mini    Other Nephew         balance disorder     Social History     Tobacco Use    Smoking status: Never    Smokeless tobacco: Never   Substance Use Topics    Alcohol use: Yes     Comment: occasional         Richard Smith MD

## 2023-03-20 NOTE — PROGRESS NOTES
Chief Complaint   Patient presents with    Hypertension    Diabetes    Cholesterol Problem     1. \"Have you been to the ER, urgent care clinic since your last visit? Hospitalized since your last visit? \" No    2. \"Have you seen or consulted any other health care providers outside of the [address] [place] [address] since your last visit? \" Yes Dr Eli Erazo for Dm  ENT appt next month for throat food getting stuck , ortho for shoulder Dr Leticia Leavitt    3. For patients over 45: Has the patient had a colonoscopy? No trying to schedule      If the patient is female:    4. For patients over 40: Has the patient had a mammogram? Yes - no Care Gap present    5. For patients over 21: Has the patient had a pap smear?  NA - based on age

## 2023-03-22 ENCOUNTER — TRANSCRIBE ORDER (OUTPATIENT)
Dept: SCHEDULING | Age: 73
End: 2023-03-22

## 2023-03-22 DIAGNOSIS — Z12.31 BREAST CANCER SCREENING BY MAMMOGRAM: Primary | ICD-10-CM

## 2023-04-22 ENCOUNTER — TRANSCRIBE ORDERS (OUTPATIENT)
Facility: HOSPITAL | Age: 73
End: 2023-04-22

## 2023-04-22 DIAGNOSIS — Z12.31 BREAST CANCER SCREENING BY MAMMOGRAM: Primary | ICD-10-CM

## 2023-04-23 DIAGNOSIS — Z12.31 BREAST CANCER SCREENING BY MAMMOGRAM: Primary | ICD-10-CM

## 2023-04-25 ENCOUNTER — NURSE TRIAGE (OUTPATIENT)
Dept: OTHER | Facility: CLINIC | Age: 73
End: 2023-04-25

## 2023-04-25 NOTE — TELEPHONE ENCOUNTER
[facility]    Received call from Benjamin stewart at [facility] with [employer]. Current Symptoms: bilateral leg swelling, left greater than right, swelling extends up to calf    Onset: 2 weeks ago;     Pain Severity: declined to give pain scale number    Temperature: denies     What has been tried: elevation    Denies - chest pain / shortness of breath / discoloration on legs / swelling to face arms or hands    Recommended disposition: See PCP within 3 Days    Care advice provided, patient verbalizes understanding; denies any other questions or concerns; instructed to call back for any new or worsening symptoms. Patient/Caller agrees with recommended disposition; writer provided warm transfer to Christina Toribio at [facility] for appointment scheduling    Attention Provider: Thank you for allowing me to participate in the care of your patient. The patient was connected to triage in response to information provided to the ECC. Please do not respond through this encounter as the response is not directed to a shared pool.         Reason for Disposition   MILD swelling of both ankles (i.e., pedal edema) AND new-onset or worsening    Protocols used: Leg Swelling and Edema-ADULT-[place]

## 2023-05-01 ENCOUNTER — OFFICE VISIT (OUTPATIENT)
Dept: FAMILY MEDICINE CLINIC | Age: 73
End: 2023-05-01
Payer: MEDICARE

## 2023-05-01 VITALS
TEMPERATURE: 97.3 F | RESPIRATION RATE: 16 BRPM | HEART RATE: 78 BPM | DIASTOLIC BLOOD PRESSURE: 72 MMHG | HEIGHT: 66 IN | BODY MASS INDEX: 36.16 KG/M2 | SYSTOLIC BLOOD PRESSURE: 134 MMHG | WEIGHT: 225 LBS | OXYGEN SATURATION: 99 %

## 2023-05-01 DIAGNOSIS — F41.8 SITUATIONAL ANXIETY: ICD-10-CM

## 2023-05-01 DIAGNOSIS — M25.511 CHRONIC RIGHT SHOULDER PAIN: ICD-10-CM

## 2023-05-01 DIAGNOSIS — E78.5 DYSLIPIDEMIA, GOAL LDL BELOW 100: ICD-10-CM

## 2023-05-01 DIAGNOSIS — E11.21 TYPE 2 DIABETES MELLITUS WITH NEPHROPATHY (HCC): Primary | ICD-10-CM

## 2023-05-01 DIAGNOSIS — R94.31 ABNORMAL EKG: ICD-10-CM

## 2023-05-01 DIAGNOSIS — G89.29 CHRONIC RIGHT SHOULDER PAIN: ICD-10-CM

## 2023-05-01 DIAGNOSIS — I10 ESSENTIAL HYPERTENSION: ICD-10-CM

## 2023-05-01 DIAGNOSIS — K59.01 CONSTIPATION BY DELAYED COLONIC TRANSIT: ICD-10-CM

## 2023-05-01 DIAGNOSIS — M47.812 CERVICAL SPONDYLOSIS WITHOUT MYELOPATHY: ICD-10-CM

## 2023-05-01 DIAGNOSIS — E55.9 VITAMIN D DEFICIENCY: ICD-10-CM

## 2023-05-01 DIAGNOSIS — Z79.4 CONTROLLED TYPE 2 DIABETES MELLITUS WITH DIABETIC NEPHROPATHY, WITH LONG-TERM CURRENT USE OF INSULIN (HCC): ICD-10-CM

## 2023-05-01 DIAGNOSIS — G47.33 OSA (OBSTRUCTIVE SLEEP APNEA): ICD-10-CM

## 2023-05-01 DIAGNOSIS — E11.21 CONTROLLED TYPE 2 DIABETES MELLITUS WITH DIABETIC NEPHROPATHY, WITH LONG-TERM CURRENT USE OF INSULIN (HCC): ICD-10-CM

## 2023-05-01 DIAGNOSIS — N32.81 OAB (OVERACTIVE BLADDER): ICD-10-CM

## 2023-05-01 DIAGNOSIS — R20.0 BILATERAL HAND NUMBNESS: ICD-10-CM

## 2023-05-01 DIAGNOSIS — E66.01 SEVERE OBESITY (BMI 35.0-35.9 WITH COMORBIDITY) (HCC): ICD-10-CM

## 2023-05-01 DIAGNOSIS — Z01.818 PRE-OP EVALUATION: ICD-10-CM

## 2023-05-01 DIAGNOSIS — J30.1 ALLERGIC RHINITIS DUE TO POLLEN, UNSPECIFIED SEASONALITY: ICD-10-CM

## 2023-05-01 PROCEDURE — 93005 ELECTROCARDIOGRAM TRACING: CPT | Performed by: FAMILY MEDICINE

## 2023-05-01 PROCEDURE — G0463 HOSPITAL OUTPT CLINIC VISIT: HCPCS | Performed by: FAMILY MEDICINE

## 2023-05-01 NOTE — PROGRESS NOTES
Chief Complaint   Patient presents with    Pre-op Exam     Right carpal tunnel release rt small finger trigger release on 05/26/23 Dr Sigrid Isaac    Swelling     Feet and legs  gets tight then skin peels    1. \"Have you been to the ER, urgent care clinic since your last visit? Hospitalized since your last visit? \" No    2. \"Have you seen or consulted any other health care providers outside of the [address] [place] [address] since your last visit? \" Yes Dr Tierney Bolaños , Dr Caesar Gordon for knees, Dr Laisha Douglas for throat,       3. For patients over 45: Has the patient had a colonoscopy? No     If the patient is female:    4. For patients over 40: Has the patient had a mammogram? Yes - no Care Gap present has appt    5. For patients over 21: Has the patient had a pap smear?  NA - based on age

## 2023-05-01 NOTE — PROGRESS NOTES
HISTORY OF PRESENT ILLNESS  HPI  Larry Del Toro is a 71 y.o. female with history of HTN, DM-II, hyperlipidemia with LDL goal < 100, and vitamin D deficiency, who presents to the office today for a pre-op exam for right carpal tunnel Sx and right small finger trigger release on 05/26/23, c/o bilateral leg tightness, and for f/u of these health problems. Pt is scheduled for right carpal tunnel Sx and right small finger trigger release on 05/26/23 with Dr. Rosie Noe (orthopedics). Pt reports has tightness and peeling radiating from her feet to her upper calf bilaterally. The crack is mainly over her ankles and upper calf. Pt is being followed by Dr. Romana Chance (endocrinology) every 3 months for her DM. Her last A1C was 8.2 done 2 months ago. Pt uses 60 units of long acting insulin before bed, and uses 12 units of short acting with brunch and 14 units before 12 units with brunch at 1 pm -2 pm  and 14 units with supper at 8 pm - 9 pm. Her glucose typical ranges from 114 to 160 and rarely 200. Pt wakes up at 9-10 am. Her glucose has been as low as 90. This last happened in the late afternoon to evening before supper. Pt recalls she was doing increased walking during that time. In the past month, she has had no hypoglycemic symptoms. When she has hypoglycemic symptoms, she only has jitteriness and that came on when her glucose was in the 70's. Pt does not check her BP outside of the office. She consumes processed foods. Pt feels she needs a new CPAP as she is not tolerating her current one. Pt denies unusual SOB, chest pain, and any recent ER visits or hospitalizations.        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    DDD (degenerative disc disease)     thoracic and cervical spine Diverticulosis     hx diverticulosis    Dyslipidemia, goal LDL below 100 9/29/2017    Eczema     Glaucoma     glaucoma    Hiatal hernia     hiatal hernia    Hyperlipidemia     Hypertension     Ill-defined condition     bulging disc/pinched nerve low back Having surgery [facility]W 1/22/19    Ill-defined condition     overweight    Lichen sclerosus     dx at [facility]    Menopause     LMP-unknown    ANMOL (obstructive sleep apnea)     CPAP - does not use regularly    Reflux esophagitis     Situational anxiety 9/29/2017    Stomach ulcer     Type II or unspecified type diabetes mellitus without mention of complication, not stated as uncontrolled      Past Surgical History:   Procedure Laterality Date    COLONOSCOPY N/A 2/27/2018    COLONOSCOPY performed by Guy Chang MD at [address]      x 3 - total of 3 back surgeries as of 2/26/2018/5 back surgeries as of 6/17/2020    HX COLONOSCOPY  2013    HX DILATION AND CURETTAGE      x 3 for abnormal bleeding    HX GYN      tumors removed from uterus - had cancer cells    HX HEENT  2002    sinus surgery for snoring & partial Uvelectomy    HX HIP REPLACEMENT  2004    rt    HX ORTHOPAEDIC Left 07/2013    hip replacement    HX TONSIL AND ADENOIDECTOMY      pt does not think her adenoids were removed    HX TUBAL LIGATION      AR DCMPRN PX PERQ NUCLEUS PULPOSUS 1/MLT LVL LUMBAR  2003    L3-4    AR PALATOPHARYNGOPLASTY  2002    AR TOTAL ABDOMINAL HYSTERECT W/WO RMVL TUBE OVARY  2 years ago, per pt. Current Outpatient Medications on File Prior to Visit   Medication Sig Dispense Refill    diclofenac EC (VOLTAREN) 75 mg EC tablet 1 Tablet. tolterodine ER (DETROL LA) 4 mg ER capsule Take 1 Capsule by mouth daily. amLODIPine (NORVASC) 5 mg tablet Take 1 Tablet by mouth daily. 90 Tablet 0    atorvastatin (LIPITOR) 40 mg tablet Take 1 Tablet by mouth daily.  90 Tablet 0    insulin aspart (NOVOLOG FLEXPEN U-100 INSULIN SC) 12-16 Units by SubCUTAneous route daily (with dinner). 12-16 in am  14 in pm      latanoprost (XALATAN) 0.005 % ophthalmic solution Administer 1 Drop to both eyes nightly. 0    CHOLECALCIFEROL, VITAMIN D3, (VITAMIN D3 PO) Take 3,000 Units by mouth daily. insulin glargine U-300 conc (TOUJEO) 300 unit/mL (1.5 mL) inpn pen 64 Units by SubCUTAneous route nightly. polyethylene glycol (MIRALAX) 17 gram/dose powder Take 17 g by mouth nightly as needed. halobetasol (ULTRAVATE) 0.05 % ointment Apply  to affected area as needed. 0    [DISCONTINUED] semaglutide (Ozempic) 1 mg/dose (4 mg/3 mL) pnij as directed inject 1.0mg Subcutaneous once a week, dispense 3 boxes for 90-day (Patient not taking: Reported on 5/1/2023)      BD INSULIN PEN NEEDLE UF MINI 31 gauge x 3/16\" ndle use four times a day for INSULIN INJECTIONS as directed  1    ONETOUCH ULTRA TEST strip   0     No current facility-administered medications on file prior to visit. Allergies   Allergen Reactions    Aspirin Nausea and Vomiting     Can take a coated aspirin. Benicar [Olmesartan] Swelling     Pt states she is not allergic to this. Ciprocinonide Swelling     Pt. States not allergic to this med. Ciprofloxacin Swelling     Pt states she is not allergic to this. Crestor [Rosuvastatin] Myalgia     Pt. Denies allergy . Famotidine Itching     All other. Sulfa (Sulfonamide Antibiotics) Hives     Pt.  Denies allergy     Family History   Problem Relation Age of Onset    Cancer Maternal Grandfather         lung    Cancer Paternal Grandfather         colon    Heart Attack Mother     Dementia Father         alzheimers    Heart Disease Father     Kidney Disease Father     Dementia Maternal Grandmother         alzheimers    Breast Cancer Paternal Aunt         age unknown    Other Sister         hysterectomy    Other Brother         shingles    Stroke Sister         mini    Other Nephew         balance disorder     Social History     Socioeconomic History    Marital status: SINGLE   Tobacco Use    Smoking status: Never    Smokeless tobacco: Never   Vaping Use    Vaping Use: Never used   Substance and Sexual Activity    Alcohol use: Yes     Comment: occasional    Drug use: No    Sexual activity: Not Currently   Other Topics Concern     Service No    Blood Transfusions No    Caffeine Concern No    Occupational Exposure No    Hobby Hazards No    Sleep Concern Yes    Stress Concern Yes    Weight Concern Yes    Special Diet No    Back Care Yes     Comment: 3 back surgeries    Exercise No    Seat Belt Yes    Self-Exams Yes   Social History Narrative    Lives at home with brother. Social Determinants of Health     Financial Resource Strain: Low Risk     Difficulty of Paying Living Expenses: Not very hard   Food Insecurity: No Food Insecurity    Worried About Running Out of Food in the Last Year: Never true    Ran Out of Food in the Last Year: Never true             Review of Systems   Constitutional:  Negative for chills, diaphoresis, fever, malaise/fatigue and weight loss. Eyes:  Negative for blurred vision, double vision, pain and redness. Respiratory:  Negative for cough, shortness of breath and wheezing. Cardiovascular:  Negative for chest pain, palpitations, orthopnea, claudication, leg swelling and PND. Genitourinary:  Negative for frequency. Skin:  Negative for itching and rash. Neurological:  Negative for dizziness, tingling, tremors, sensory change, speech change, focal weakness, seizures, loss of consciousness, weakness and headaches. Endo/Heme/Allergies:  Negative for environmental allergies and polydipsia. Does not bruise/bleed easily.    Results for orders placed or performed in visit on 05/01/23   CBC W/O DIFF   Result Value Ref Range    WBC 9.5 3.6 - 11.0 K/uL    RBC 4.92 3.80 - 5.20 M/uL    HGB 11.5 11.5 - 16.0 g/dL    HCT 40.1 35.0 - 47.0 %    MCV 81.5 80.0 - 99.0 FL    MCH 23.4 (L) 26.0 - 34.0 PG    MCHC 28.7 (L) 30.0 - 36.5 g/dL    RDW 17. 4 (H) 11.5 - 14.5 %    PLATELET 370 375 - 310 K/uL    MPV 10.7 8.9 - 12.9 FL    NRBC 0.0 0  WBC    ABSOLUTE NRBC 0.00 0.00 - 1.70 K/uL   METABOLIC PANEL, COMPREHENSIVE   Result Value Ref Range    Sodium 140 136 - 145 mmol/L    Potassium 4.5 3.5 - 5.1 mmol/L    Chloride 105 97 - 108 mmol/L    CO2 31 21 - 32 mmol/L    Anion gap 4 (L) 5 - 15 mmol/L    Glucose 105 (H) 65 - 100 mg/dL    BUN 12 6 - 20 MG/DL    Creatinine 0.98 0.55 - 1.02 MG/DL    BUN/Creatinine ratio 12 12 - 20      eGFR >60 >60 ml/min/1.73m2    Calcium 9.7 8.5 - 10.1 MG/DL    Bilirubin, total 0.4 0.2 - 1.0 MG/DL    ALT (SGPT) 19 12 - 78 U/L    AST (SGOT) 15 15 - 37 U/L    Alk. phosphatase 156 (H) 45 - 117 U/L    Protein, total 7.6 6.4 - 8.2 g/dL    Albumin 3.9 3.5 - 5.0 g/dL    Globulin 3.7 2.0 - 4.0 g/dL    A-G Ratio 1.1 1.1 - 2.2     HEMOGLOBIN A1C WITH EAG   Result Value Ref Range    Hemoglobin A1c 7.6 (H) 4.0 - 5.6 %    Est. average glucose 171 mg/dL           Physical Exam  Visit Vitals  /72 (BP 1 Location: Left upper arm, BP Patient Position: Sitting, BP Cuff Size: Large adult)   Pulse 78   Temp 97.3 °F (36.3 °C) (Temporal)   Resp 16   Ht 5' 6\" (1.676 m)   Wt 225 lb (102.1 kg)   LMP 09/23/2009 Comment: some spotting   SpO2 99%   BMI 36.32 kg/m²       General:  Alert, cooperative, no distress, appears stated age. Head:  Normocephalic, without obvious abnormality, atraumatic. Eyes:  Conjunctivae/corneas clear. PERRL, EOMs intact. Fundi benign. Ears:  Normal TMs and external ear canals both ears. Nose: Nares normal. Septum midline. Mucosa normal. No drainage or sinus tenderness. Throat: Lips, mucosa, and tongue normal. Teeth and gums normal.   Neck: Supple, symmetrical, trachea midline, no adenopathy, thyroid: no enlargement/tenderness/nodules, no carotid bruit and no JVD. Back:   Symmetric, no curvature. ROM normal. No CVA tenderness. Lungs:   Clear to auscultation bilaterally. Chest wall:  No tenderness or deformity. Heart:  Regular rate and rhythm, S1, S2 normal, no murmur, click, rub or gallop. Abdomen:   Soft, non-tender. Bowel sounds normal. No masses,  No organomegaly. Extremities: Extremities normal, atraumatic, no cyanosis. She has 1+ edema bilaterally but has chronic venous changes in both legs with very thickened skin. There are deep furrows at her ankles but no active wounds present. Pulses: 2+ and symmetric all extremities. Skin: Skin color, texture, turgor normal. No rashes or lesions. Lymph nodes: Cervical, supraclavicular, and axillary nodes normal.   Neurologic: CNII-XII intact. Normal strength, sensation and reflexes throughout. EKG does show \"old anteroseptal infarct\", but EKG done 5 years ago shows similar changes and most recent on done 11/03/2020 does not show that (pt has no hx of CAD, so this may be a false positive reading). ASSESSMENT and PLAN    ICD-10-CM ICD-9-CM    1. Type 2 diabetes mellitus with nephropathy ([facility])  E11.21 250.40 HEMOGLOBIN A1C WITH EAG     740.23 METABOLIC PANEL, COMPREHENSIVE      METABOLIC PANEL, COMPREHENSIVE      HEMOGLOBIN A1C WITH EAG      2. Pre-op evaluation  Z01.818 V72.84 HEMOGLOBIN A1C WITH EAG      AMB POC EKG ROUTINE W/ 12 LEADS, INTER & REP      HEMOGLOBIN A1C WITH EAG      REFERRAL TO CARDIOLOGY      3. Controlled type 2 diabetes mellitus with diabetic nephropathy, with long-term current use of insulin ([facility])  E11.21 250.40     Z79.4 583.81      V58.67       4. Dyslipidemia, goal LDL below 100  E78.5 272.4       5. OAB (overactive bladder)  N32.81 596.51       6. Vitamin D deficiency  E55.9 268.9       7. Severe obesity (BMI 35.0-35.9 with comorbidity) ([facility])  E66.01 278.01     Z68.35 V85.35       8. ANMOL (obstructive sleep apnea)  G47.33 327.23       9. Essential hypertension  I10 401.9 CBC W/O DIFF      CBC W/O DIFF      10. Abnormal EKG  R94.31 794.31 REFERRAL TO CARDIOLOGY      11. Constipation by delayed colonic transit  K59.01 564.01       12. Situational anxiety  F41.8 300.09       13. Cervical spondylosis without myelopathy  M47.812 721.0       14. Chronic right shoulder pain  M25.511 719.41     G89.29 338.29       15. Allergic rhinitis due to pollen, unspecified seasonality  J30.1 477.0       16. Bilateral hand numbness  R20.0 782.0     carpal tunnel        Diagnoses and all orders for this visit:    1. Type 2 diabetes mellitus with nephropathy (HCC)  -     HEMOGLOBIN A1C WITH EAG; Future  -     METABOLIC PANEL, COMPREHENSIVE; Future    2. Pre-op evaluation  -     HEMOGLOBIN A1C WITH EAG; Future  -     AMB POC EKG ROUTINE W/ 12 LEADS, INTER & REP  -     REFERRAL TO CARDIOLOGY    3. Controlled type 2 diabetes mellitus with diabetic nephropathy, with long-term current use of insulin ([facility] [address]    4. Dyslipidemia, goal LDL below 100    5. OAB (overactive bladder)    6. Vitamin D deficiency    7. Severe obesity (BMI 35.0-35.9 with comorbidity) ([facility] [address]    8. ANMOL (obstructive sleep apnea)    9. Essential hypertension  -     CBC W/O DIFF; Future    10. Abnormal EKG  -     REFERRAL TO CARDIOLOGY    11. Constipation by delayed colonic transit    12. Situational anxiety    13. Cervical spondylosis without myelopathy    14. Chronic right shoulder pain    15. Allergic rhinitis due to pollen, unspecified seasonality    16. Bilateral hand numbness  Comments:  carpal tunnel      lab results and schedule of future lab studies reviewed with patient  reviewed diet, exercise and weight control  cardiovascular risk and specific lipid/LDL goals reviewed  reviewed medications and side effects in detail  Please call my office if there are any questions- [phone]. I will arrange for follow up after the lab tests done from today return  Recommended a weekly \"heart check. \" I went into detail how to do this. Call for refills on medications as needed. Discussed expected course/resolution/complications of diagnosis in detail with patient.    Medication risks/benefits/costs/interactions/alternatives discussed with patient. Pt was given an after visit summary which includes diagnoses, current medications & vitals. Pt expressed understanding with the diagnosis and plan     BMI is significantly elevated- in the obese range. I reviewed diet, exercise and weight control. Discussed weight control in detail, the importance of mainly decreased carbs, and for weight maintenance, exercise; discussed different diets and that it isn't as important to watch the type of foods as it is to decrease calorie intake no matter what type of diet you do, etc.  Mindful eating also discussed- Naturally Slim( now [employer]) or Noom are 2 options. Total 30 minutes  re: Discussed specific diabetic recommendations: low cholesterol diet, weight control and daily exercise discussed, home glucose monitoring emphasized, all medications, side effects and compliance discussed carefully, foot care discussed and Podiatry visits discussed, annual eye examinations at Ophthalmology discussed, glycohemoglobin and other lab monitoring discussed and long term diabetic complications discussed. Reviewed BP, cholesterol and diabetic goals. LDL goal<100,HDL goal>45, Triglyceride goal<150, A1C< 7.0, BP<140/90. Review of  the proper technique of checking the blood pressure- check it on an average day only, not on a stressful day, sitting, no exercise for at least 1 hour and not experiencing any new pain( chronic pain is OK). Patient encouraged to check BP sitting and standing at least once a month and to report these readings to me if > 140/ 90 on average , or if the standing BP is >  15 points lower than the sitting. Always check it twice and if there is > 5 points decrease from the previous reading( top reading or systolic) keep checking it until it does not drop 5 points. Write only this final reading down, not the preceding readings.   If out of these readings there is only 1 out of 4 or less > 696, or > 90 diastolic then your blood pressure is OK; it needs further treatment if it is above this. Also, don't forget,  as noted above, to check your blood pressure standing once a month; this is to detect a drop in your BP that might lead to fainting and serious injury; you check it standing with your arm hanging straight down and relaxed. Check it twice waiting 1 minute between the two readings. If, with either one of these 2 readings there is a > 15 point drop of the systolic compared to your sitting pressure( done before the standing BP), then let me know. Following these guidelines, continue to check your BP and write down only the ones described above and it will help me to effectively treat your blood pressure. Reviewed symptoms, or lack thereof, of hypertension and elevated cholesterol. Regular exercise is very important to your health; it helps mentally, physically, socially; it prevents injuries if done properly. Exercise, even as simple as walking 20-30 minutes daily has major benefits to your health even though your \"numbers\" are the same in the lab. See if you can add this into your daily regimen and after a few months it will become a regular habit-\"just something you do,\" like brushing your teeth. A combination of aerobic exercise and strengthening and stretching is felt to be the best for you, so this should be your ultimate goal.   This can be done in the privacy of your home or in a group setting as at the gym  Some prefer having a , others prefer to do exercise in groups or individually. Do what \"works\" for you. You need to make it simple and \"fun,\" or you most likely will not continue it. Recommended a weekly \"heart check. \" I went into detail how to do this. Also, discussed symptoms of concern that were noted today in the note above, treatment options( including doing nothing), when to follow up before recommended time frame. Also, answered all questions.   Because of EKG changes, she needs to have cardiac clearance, so she was referred to cardiology for this. Because there are no significant ST or T wave changes on EKG, this most likely is not any active CAD, but we need to make sure with cardiology help before we approve her for Sx, otherwise she is cleared for her hand Sx scheduled for 05/26 pending her lab work. For her edema, I reviewed with her the treatment of that and this is not something you can cure, but we can improve significantly. We talked about weight loss, low salt diet, leg elevation, support hose, etc. I recommended light application of Vaseline to the skin if there is dryness or any furrows and applying first aid cream to an open wound in the leg and urgent follow up here is recommended. PT DOES HAVE OBSTRUCTIVE SLEEP APNEA AND FOR THAT REASON NEEDS TO BE WATCHED CAREFULLY WHEN SHE IS UNDER SEDATION. This document was written by Shani Ferguson, as dictated by Paul Clark MD.   I have reviewed and agree with the above note and have made corrections where appropriate Henry Mayorga M.D.

## 2023-05-02 LAB
ALBUMIN SERPL-MCNC: 3.9 G/DL (ref 3.5–5)
ALBUMIN/GLOB SERPL: 1.1 (ref 1.1–2.2)
ALP SERPL-CCNC: 156 U/L (ref 45–117)
ALT SERPL-CCNC: 19 U/L (ref 12–78)
ANION GAP SERPL CALC-SCNC: 4 MMOL/L (ref 5–15)
AST SERPL-CCNC: 15 U/L (ref 15–37)
BILIRUB SERPL-MCNC: 0.4 MG/DL (ref 0.2–1)
BUN SERPL-MCNC: 12 MG/DL (ref 6–20)
BUN/CREAT SERPL: 12 (ref 12–20)
CALCIUM SERPL-MCNC: 9.7 MG/DL (ref 8.5–10.1)
CHLORIDE SERPL-SCNC: 105 MMOL/L (ref 97–108)
CO2 SERPL-SCNC: 31 MMOL/L (ref 21–32)
CREAT SERPL-MCNC: 0.98 MG/DL (ref 0.55–1.02)
ERYTHROCYTE [DISTWIDTH] IN BLOOD BY AUTOMATED COUNT: 17.4 % (ref 11.5–14.5)
EST. AVERAGE GLUCOSE BLD GHB EST-MCNC: 171 MG/DL
GLOBULIN SER CALC-MCNC: 3.7 G/DL (ref 2–4)
GLUCOSE SERPL-MCNC: 105 MG/DL (ref 65–100)
HBA1C MFR BLD: 7.6 % (ref 4–5.6)
HCT VFR BLD AUTO: 40.1 % (ref 35–47)
HGB BLD-MCNC: 11.5 G/DL (ref 11.5–16)
MCH RBC QN AUTO: 23.4 PG (ref 26–34)
MCHC RBC AUTO-ENTMCNC: 28.7 G/DL (ref 30–36.5)
MCV RBC AUTO: 81.5 FL (ref 80–99)
NRBC # BLD: 0 K/UL (ref 0–0.01)
NRBC BLD-RTO: 0 PER 100 WBC
PLATELET # BLD AUTO: 329 K/UL (ref 150–400)
PMV BLD AUTO: 10.7 FL (ref 8.9–12.9)
POTASSIUM SERPL-SCNC: 4.5 MMOL/L (ref 3.5–5.1)
PROT SERPL-MCNC: 7.6 G/DL (ref 6.4–8.2)
RBC # BLD AUTO: 4.92 M/UL (ref 3.8–5.2)
SODIUM SERPL-SCNC: 140 MMOL/L (ref 136–145)
WBC # BLD AUTO: 9.5 K/UL (ref 3.6–11)

## 2023-05-18 ENCOUNTER — TELEPHONE (OUTPATIENT)
Age: 73
End: 2023-05-18

## 2023-05-18 NOTE — TELEPHONE ENCOUNTER
Jenniffer called from [facility] said pt is scheduled 5/23/23 for a procedure,and they are requesting recent labs and office notes.        BCB# [phone]  FAX# [phone]

## 2023-05-23 ENCOUNTER — HOSPITAL ENCOUNTER (OUTPATIENT)
Facility: HOSPITAL | Age: 73
Discharge: HOME | End: 2023-05-26
Payer: MEDICARE

## 2023-05-23 DIAGNOSIS — Z12.31 BREAST CANCER SCREENING BY MAMMOGRAM: ICD-10-CM

## 2023-05-23 PROCEDURE — 77063 BREAST TOMOSYNTHESIS BI: CPT

## 2023-09-11 ENCOUNTER — OFFICE VISIT (OUTPATIENT)
Age: 73
End: 2023-09-11
Payer: MEDICARE

## 2023-09-11 VITALS
SYSTOLIC BLOOD PRESSURE: 132 MMHG | WEIGHT: 222 LBS | TEMPERATURE: 98.3 F | DIASTOLIC BLOOD PRESSURE: 72 MMHG | RESPIRATION RATE: 16 BRPM | HEART RATE: 82 BPM | HEIGHT: 66 IN | OXYGEN SATURATION: 98 % | BODY MASS INDEX: 35.68 KG/M2

## 2023-09-11 DIAGNOSIS — I10 PRIMARY HYPERTENSION: ICD-10-CM

## 2023-09-11 DIAGNOSIS — N32.81 OAB (OVERACTIVE BLADDER): ICD-10-CM

## 2023-09-11 DIAGNOSIS — M47.812 SPONDYLOSIS WITHOUT MYELOPATHY OR RADICULOPATHY, CERVICAL REGION: ICD-10-CM

## 2023-09-11 DIAGNOSIS — E11.21 TYPE 2 DIABETES MELLITUS WITH NEPHROPATHY (HCC): Primary | ICD-10-CM

## 2023-09-11 DIAGNOSIS — N32.81 OVERACTIVE BLADDER: ICD-10-CM

## 2023-09-11 DIAGNOSIS — G47.33 OBSTRUCTIVE SLEEP APNEA (ADULT) (PEDIATRIC): ICD-10-CM

## 2023-09-11 DIAGNOSIS — H25.013 CORTICAL AGE-RELATED CATARACT OF BOTH EYES: ICD-10-CM

## 2023-09-11 DIAGNOSIS — K21.9 GASTRO-ESOPHAGEAL REFLUX DISEASE WITHOUT ESOPHAGITIS: ICD-10-CM

## 2023-09-11 DIAGNOSIS — E78.5 DYSLIPIDEMIA, GOAL LDL BELOW 100: ICD-10-CM

## 2023-09-11 DIAGNOSIS — K59.01 SLOW TRANSIT CONSTIPATION: ICD-10-CM

## 2023-09-11 DIAGNOSIS — J30.1 SEASONAL ALLERGIC RHINITIS DUE TO POLLEN: ICD-10-CM

## 2023-09-11 DIAGNOSIS — E55.9 VITAMIN D DEFICIENCY, UNSPECIFIED: ICD-10-CM

## 2023-09-11 DIAGNOSIS — Z11.59 NEED FOR HEPATITIS C SCREENING TEST: ICD-10-CM

## 2023-09-11 PROCEDURE — 99215 OFFICE O/P EST HI 40 MIN: CPT | Performed by: FAMILY MEDICINE

## 2023-09-11 PROCEDURE — 2022F DILAT RTA XM EVC RTNOPTHY: CPT | Performed by: FAMILY MEDICINE

## 2023-09-11 PROCEDURE — G8427 DOCREV CUR MEDS BY ELIG CLIN: HCPCS | Performed by: FAMILY MEDICINE

## 2023-09-11 PROCEDURE — G8399 PT W/DXA RESULTS DOCUMENT: HCPCS | Performed by: FAMILY MEDICINE

## 2023-09-11 PROCEDURE — 3074F SYST BP LT 130 MM HG: CPT | Performed by: FAMILY MEDICINE

## 2023-09-11 PROCEDURE — 1123F ACP DISCUSS/DSCN MKR DOCD: CPT | Performed by: FAMILY MEDICINE

## 2023-09-11 PROCEDURE — 3078F DIAST BP <80 MM HG: CPT | Performed by: FAMILY MEDICINE

## 2023-09-11 PROCEDURE — G8417 CALC BMI ABV UP PARAM F/U: HCPCS | Performed by: FAMILY MEDICINE

## 2023-09-11 PROCEDURE — 1036F TOBACCO NON-USER: CPT | Performed by: FAMILY MEDICINE

## 2023-09-11 PROCEDURE — 1090F PRES/ABSN URINE INCON ASSESS: CPT | Performed by: FAMILY MEDICINE

## 2023-09-11 PROCEDURE — 3017F COLORECTAL CA SCREEN DOC REV: CPT | Performed by: FAMILY MEDICINE

## 2023-09-11 PROCEDURE — 3051F HG A1C>EQUAL 7.0%<8.0%: CPT | Performed by: FAMILY MEDICINE

## 2023-09-11 RX ORDER — INSULIN ASPART 100 [IU]/ML
12-16 INJECTION, SOLUTION INTRAVENOUS; SUBCUTANEOUS
COMMUNITY

## 2023-09-11 RX ORDER — TOLTERODINE 4 MG/1
4 CAPSULE, EXTENDED RELEASE ORAL DAILY
COMMUNITY

## 2023-09-11 SDOH — ECONOMIC STABILITY: FOOD INSECURITY: WITHIN THE PAST 12 MONTHS, THE FOOD YOU BOUGHT JUST DIDN'T LAST AND YOU DIDN'T HAVE MONEY TO GET MORE.: NEVER TRUE

## 2023-09-11 SDOH — ECONOMIC STABILITY: INCOME INSECURITY: HOW HARD IS IT FOR YOU TO PAY FOR THE VERY BASICS LIKE FOOD, HOUSING, MEDICAL CARE, AND HEATING?: NOT HARD AT ALL

## 2023-09-11 SDOH — ECONOMIC STABILITY: HOUSING INSECURITY
IN THE LAST 12 MONTHS, WAS THERE A TIME WHEN YOU DID NOT HAVE A STEADY PLACE TO SLEEP OR SLEPT IN A SHELTER (INCLUDING NOW)?: NO

## 2023-09-11 SDOH — ECONOMIC STABILITY: FOOD INSECURITY: WITHIN THE PAST 12 MONTHS, YOU WORRIED THAT YOUR FOOD WOULD RUN OUT BEFORE YOU GOT MONEY TO BUY MORE.: NEVER TRUE

## 2023-09-11 ASSESSMENT — PATIENT HEALTH QUESTIONNAIRE - PHQ9
1. LITTLE INTEREST OR PLEASURE IN DOING THINGS: 0
SUM OF ALL RESPONSES TO PHQ QUESTIONS 1-9: 0
2. FEELING DOWN, DEPRESSED OR HOPELESS: 0
SUM OF ALL RESPONSES TO PHQ9 QUESTIONS 1 & 2: 0

## 2023-09-11 ASSESSMENT — ENCOUNTER SYMPTOMS
SHORTNESS OF BREATH: 0
EYE PAIN: 0
WHEEZING: 0
EYE REDNESS: 0
COUGH: 0

## 2023-09-11 NOTE — PROGRESS NOTES
Subjective     HPI    Priscilla Price is a 71 y.o. female with history of HTN, DM-II, hyperlipidemia with LDL goal < 100, and vitamin D deficiency, who presents to the office today for a pre-op exam and for f/u of these health problems. Pt has cataract sx on 9/26/23 (R eye) & 10/5/23 (L eye) by Dr. Iqra Pelayo (ophthalmologist). Pt notes that she is going to Dr. Ella Bernard (orthopedics) for her L rotator cuff in October. Pt states that she is no longer taking her metformin due to nausea. Pt is no longer using her eye drops for glaucoma; topped them on her own without recommendation from a doctor. Pt denies unusual SOB, chest pain, and any recent ER visits or hospitalizations.      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    DDD (degenerative disc disease)     thoracic and cervical spine    Diverticulosis     hx diverticulosis    Dyslipidemia, goal LDL below 100 9/29/2017    Eczema     Glaucoma     glaucoma    Hiatal hernia     hiatal hernia    Hyperlipidemia     Hypertension     Ill-defined condition     overweight    Ill-defined condition     bulging disc/pinched nerve low back Having surgery CJW 8/24/26    Lichen sclerosus     dx at [facility]    Menopause     LMP-unknown    GREGG (obstructive sleep apnea)     CPAP - does not use regularly    Reflux esophagitis     Situational anxiety 9/29/2017    Stomach ulcer     Type II or unspecified type diabetes mellitus without mention of complication, not stated as uncontrolled      Past Surgical History:   Procedure Laterality Date    BACK SURGERY      x 3 - total of 3 back surgeries as of 2/26/2018/5 back surgeries as of 6/17/2020    COLONOSCOPY N/A 2/27/2018    COLONOSCOPY performed by Anna Townsend MD at [address] [place] [address]  2013    [facility]

## 2023-09-12 LAB
ALBUMIN SERPL-MCNC: 3.8 G/DL (ref 3.5–5)
ALBUMIN/GLOB SERPL: 1.2 (ref 1.1–2.2)
ALP SERPL-CCNC: 166 U/L (ref 45–117)
ALT SERPL-CCNC: 22 U/L (ref 12–78)
ANION GAP SERPL CALC-SCNC: 4 MMOL/L (ref 5–15)
AST SERPL-CCNC: 12 U/L (ref 15–37)
BILIRUB SERPL-MCNC: 0.5 MG/DL (ref 0.2–1)
BUN SERPL-MCNC: 11 MG/DL (ref 6–20)
BUN/CREAT SERPL: 10 (ref 12–20)
CALCIUM SERPL-MCNC: 9.3 MG/DL (ref 8.5–10.1)
CHLORIDE SERPL-SCNC: 107 MMOL/L (ref 97–108)
CHOLEST SERPL-MCNC: 146 MG/DL
CO2 SERPL-SCNC: 31 MMOL/L (ref 21–32)
CREAT SERPL-MCNC: 1.05 MG/DL (ref 0.55–1.02)
CREAT UR-MCNC: 151 MG/DL
EST. AVERAGE GLUCOSE BLD GHB EST-MCNC: 171 MG/DL
GLOBULIN SER CALC-MCNC: 3.3 G/DL (ref 2–4)
GLUCOSE SERPL-MCNC: 114 MG/DL (ref 65–100)
HBA1C MFR BLD: 7.6 % (ref 4–5.6)
HCV AB SER IA-ACNC: 0.2 INDEX
HCV AB SERPL QL IA: NONREACTIVE
HDLC SERPL-MCNC: 55 MG/DL
HDLC SERPL: 2.7 (ref 0–5)
LDLC SERPL CALC-MCNC: 75.2 MG/DL (ref 0–100)
MICROALBUMIN UR-MCNC: 2.47 MG/DL
MICROALBUMIN/CREAT UR-RTO: 16 MG/G (ref 0–30)
POTASSIUM SERPL-SCNC: 4.2 MMOL/L (ref 3.5–5.1)
PROT SERPL-MCNC: 7.1 G/DL (ref 6.4–8.2)
SODIUM SERPL-SCNC: 142 MMOL/L (ref 136–145)
TRIGL SERPL-MCNC: 79 MG/DL
VLDLC SERPL CALC-MCNC: 15.8 MG/DL

## 2023-10-17 LAB — HBA1C MFR BLD HPLC: 7.8 %

## 2023-10-31 ENCOUNTER — TELEPHONE (OUTPATIENT)
Age: 73
End: 2023-10-31

## 2023-10-31 NOTE — TELEPHONE ENCOUNTER
----- Message from Mary Hoang sent at 10/31/2023 12:23 PM EDT -----  Subject: Message to Provider    QUESTIONS  Information for Provider? Raphael Guzman from At [address] [place] [address] need visit notes from   pt's last office visit. These can be faxed to [phone]. She can be   reached at the # listed if there are any questions  ---------------------------------------------------------------------------  --------------  CALL BACK INFO  [phone]; OK to leave message on voicemail  ---------------------------------------------------------------------------  --------------  SCRIPT ANSWERS  Relationship to Patient? Covered Entity  Covered Entity Type? Home Health Care? Representative Name?  Raphael Guzman

## 2023-12-04 ENCOUNTER — TELEPHONE (OUTPATIENT)
Age: 73
End: 2023-12-04

## 2023-12-04 NOTE — TELEPHONE ENCOUNTER
Tracie from [facility] called stated patient heart rate 104 to 111 when she is up and about doing this heart rate goes up.     Best call back # [phone]

## 2023-12-04 NOTE — TELEPHONE ENCOUNTER
OK unless BP drops > 15 points when stands( check orthostatic BP) or she has shortness of breath for an extended time when she does walk. Needs to have CBC checked to check for anemia     Pt has no symptoms and bp has been normal  They have discharged her from their care.  Has follow up with surgeon

## 2024-02-12 NOTE — TELEPHONE ENCOUNTER
Pt's scheduled to see  on 5/28/24 @ 3:45 PM it was 's first available.\"Pt stated she is completely out of her Norvasc 5 mg and will need enough until she can be seen.

## 2024-02-13 RX ORDER — AMLODIPINE BESYLATE 5 MG/1
TABLET ORAL
Qty: 90 TABLET | Refills: 1 | Status: SHIPPED | OUTPATIENT
Start: 2024-02-13

## 2024-04-16 ENCOUNTER — TRANSCRIBE ORDERS (OUTPATIENT)
Facility: HOSPITAL | Age: 74
End: 2024-04-16

## 2024-04-16 DIAGNOSIS — Z12.31 SCREENING MAMMOGRAM FOR BREAST CANCER: Primary | ICD-10-CM

## 2024-04-24 NOTE — TELEPHONE ENCOUNTER
----- Message from Leticia Almendarez sent at 1/16/2018  4:00 PM EST -----  Regarding: Dr. Batista Certain / Telephone  Pt would like to know lab work results and pt wants to set up a cat scan. Pt still having pain.    Best contact: [phone]
----- Message from Marcia Ricardo MD sent at 1/16/2018  8:23 AM EST -----  Please let her know that kidney test and potassium were fine. She can resume her diovan (valsartan). Thanks!
Left message on Identified VM regarding normal labs and that she can resume the Diovan. Advised to call office a [phone] to confirm she received message.
This was a message from yesterday and routed today. This has already been addressed.
done

## 2024-05-23 ENCOUNTER — HOSPITAL ENCOUNTER (OUTPATIENT)
Facility: HOSPITAL | Age: 74
Discharge: HOME | End: 2024-05-23
Attending: FAMILY MEDICINE
Payer: MEDICARE

## 2024-05-23 VITALS — WEIGHT: 214 LBS | BODY MASS INDEX: 34.39 KG/M2 | HEIGHT: 66 IN

## 2024-05-23 DIAGNOSIS — Z12.31 SCREENING MAMMOGRAM FOR BREAST CANCER: ICD-10-CM

## 2024-05-23 PROCEDURE — 77063 BREAST TOMOSYNTHESIS BI: CPT

## 2024-05-28 ENCOUNTER — OFFICE VISIT (OUTPATIENT)
Age: 74
End: 2024-05-28
Payer: MEDICARE

## 2024-05-28 VITALS
RESPIRATION RATE: 16 BRPM | BODY MASS INDEX: 34.55 KG/M2 | HEIGHT: 66 IN | OXYGEN SATURATION: 98 % | HEART RATE: 98 BPM | WEIGHT: 215 LBS | TEMPERATURE: 97.3 F | DIASTOLIC BLOOD PRESSURE: 74 MMHG | SYSTOLIC BLOOD PRESSURE: 132 MMHG

## 2024-05-28 DIAGNOSIS — I10 PRIMARY HYPERTENSION: ICD-10-CM

## 2024-05-28 DIAGNOSIS — E55.9 VITAMIN D DEFICIENCY, UNSPECIFIED: ICD-10-CM

## 2024-05-28 DIAGNOSIS — E78.01 FAMILIAL HYPERCHOLESTEROLEMIA: ICD-10-CM

## 2024-05-28 DIAGNOSIS — J30.1 SEASONAL ALLERGIC RHINITIS DUE TO POLLEN: ICD-10-CM

## 2024-05-28 DIAGNOSIS — E78.5 DYSLIPIDEMIA, GOAL LDL BELOW 100: ICD-10-CM

## 2024-05-28 DIAGNOSIS — E11.21 TYPE 2 DIABETES MELLITUS WITH NEPHROPATHY (HCC): Primary | ICD-10-CM

## 2024-05-28 LAB
ALBUMIN SERPL-MCNC: 3.7 G/DL (ref 3.5–5)
ALBUMIN/GLOB SERPL: 1 (ref 1.1–2.2)
ALP SERPL-CCNC: 178 U/L (ref 45–117)
ALT SERPL-CCNC: 17 U/L (ref 12–78)
ANION GAP SERPL CALC-SCNC: 4 MMOL/L (ref 5–15)
AST SERPL-CCNC: 10 U/L (ref 15–37)
BILIRUB SERPL-MCNC: 0.4 MG/DL (ref 0.2–1)
BUN SERPL-MCNC: 16 MG/DL (ref 6–20)
BUN/CREAT SERPL: 14 (ref 12–20)
CALCIUM SERPL-MCNC: 10 MG/DL (ref 8.5–10.1)
CHLORIDE SERPL-SCNC: 104 MMOL/L (ref 97–108)
CHOLEST SERPL-MCNC: 166 MG/DL
CO2 SERPL-SCNC: 31 MMOL/L (ref 21–32)
CREAT SERPL-MCNC: 1.18 MG/DL (ref 0.55–1.02)
EST. AVERAGE GLUCOSE BLD GHB EST-MCNC: 189 MG/DL
GLOBULIN SER CALC-MCNC: 3.7 G/DL (ref 2–4)
GLUCOSE SERPL-MCNC: 136 MG/DL (ref 65–100)
HBA1C MFR BLD: 8.2 % (ref 4–5.6)
HDLC SERPL-MCNC: 62 MG/DL
HDLC SERPL: 2.7 (ref 0–5)
LDLC SERPL CALC-MCNC: 80.2 MG/DL (ref 0–100)
POTASSIUM SERPL-SCNC: 4.1 MMOL/L (ref 3.5–5.1)
PROT SERPL-MCNC: 7.4 G/DL (ref 6.4–8.2)
SODIUM SERPL-SCNC: 139 MMOL/L (ref 136–145)
TRIGL SERPL-MCNC: 119 MG/DL
VLDLC SERPL CALC-MCNC: 23.8 MG/DL

## 2024-05-28 PROCEDURE — 3046F HEMOGLOBIN A1C LEVEL >9.0%: CPT | Performed by: FAMILY MEDICINE

## 2024-05-28 PROCEDURE — 2022F DILAT RTA XM EVC RTNOPTHY: CPT | Performed by: FAMILY MEDICINE

## 2024-05-28 PROCEDURE — G8417 CALC BMI ABV UP PARAM F/U: HCPCS | Performed by: FAMILY MEDICINE

## 2024-05-28 PROCEDURE — 3075F SYST BP GE 130 - 139MM HG: CPT | Performed by: FAMILY MEDICINE

## 2024-05-28 PROCEDURE — 1123F ACP DISCUSS/DSCN MKR DOCD: CPT | Performed by: FAMILY MEDICINE

## 2024-05-28 PROCEDURE — G8427 DOCREV CUR MEDS BY ELIG CLIN: HCPCS | Performed by: FAMILY MEDICINE

## 2024-05-28 PROCEDURE — 99215 OFFICE O/P EST HI 40 MIN: CPT | Performed by: FAMILY MEDICINE

## 2024-05-28 PROCEDURE — 1036F TOBACCO NON-USER: CPT | Performed by: FAMILY MEDICINE

## 2024-05-28 PROCEDURE — G8399 PT W/DXA RESULTS DOCUMENT: HCPCS | Performed by: FAMILY MEDICINE

## 2024-05-28 PROCEDURE — 3078F DIAST BP <80 MM HG: CPT | Performed by: FAMILY MEDICINE

## 2024-05-28 PROCEDURE — 1090F PRES/ABSN URINE INCON ASSESS: CPT | Performed by: FAMILY MEDICINE

## 2024-05-28 PROCEDURE — 3017F COLORECTAL CA SCREEN DOC REV: CPT | Performed by: FAMILY MEDICINE

## 2024-05-28 RX ORDER — ATORVASTATIN CALCIUM 40 MG/1
40 TABLET, FILM COATED ORAL DAILY
Qty: 90 TABLET | Refills: 1 | Status: SHIPPED | OUTPATIENT
Start: 2024-05-28

## 2024-05-28 RX ORDER — ASPIRIN 81 MG/1
81 TABLET ORAL DAILY
COMMUNITY

## 2024-05-28 ASSESSMENT — PATIENT HEALTH QUESTIONNAIRE - PHQ9
2. FEELING DOWN, DEPRESSED OR HOPELESS: NOT AT ALL
SUM OF ALL RESPONSES TO PHQ QUESTIONS 1-9: 0
SUM OF ALL RESPONSES TO PHQ9 QUESTIONS 1 & 2: 0
1. LITTLE INTEREST OR PLEASURE IN DOING THINGS: NOT AT ALL
SUM OF ALL RESPONSES TO PHQ QUESTIONS 1-9: 0

## 2024-05-28 ASSESSMENT — ENCOUNTER SYMPTOMS
EYE PAIN: 0
EYE REDNESS: 0
WHEEZING: 0
SHORTNESS OF BREATH: 0
COUGH: 0

## 2024-05-28 NOTE — PROGRESS NOTES
Subjective     HPI    Rosmery Malloy is a 73 y.o. female who comes in with HTN, DM-II, hyperlipidemia with LDL goal < 100, and vitamin D deficiency, who presents to the office today c/o cough and for f/u of these health problems.      [facility] at [facility] for pneumonia last month. She had chills, body aches, sore throat, and cough for around a month. She tested negative for COVID, and denies wheezing. She was placed on multiple antibiotics (two of them were BID, and one of them were daily). She notes that her WBC was found to be high while at [facility], so she went to [facility], but was told by [facility] doctors that her WBC was fine.     Pt denies unusual SOB, or chest pain.    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    DDD (degenerative disc disease)     thoracic and cervical spine    Diverticulosis     hx diverticulosis    Dyslipidemia, goal LDL below 100 9/29/2017    Eczema     Glaucoma     glaucoma    Hiatal hernia     hiatal hernia    Hyperlipidemia     Hypertension     Ill-defined condition     overweight    Ill-defined condition     bulging disc/pinched nerve low back Having surgery CJW 1/22/19    Lichen sclerosus     dx at [facility]    Menopause     LMP-unknown    GREGG (obstructive sleep apnea)     CPAP - does not use regularly    Reflux esophagitis     Situational anxiety 9/29/2017    Stomach ulcer     Type II or unspecified type diabetes mellitus without mention of complication, not stated as uncontrolled      Past Surgical History:   Procedure Laterality Date    BACK SURGERY      x 3 - total of 3 back surgeries as of 2/26/2018/5 back surgeries as of 6/17/2020    COLONOSCOPY N/A 2/27/2018    COLONOSCOPY performed by Jp Blankenship MD at [facility] ENDOSCOPY    COLONOSCOPY  2013    DCMPRN PERQ NUCLEUS PULPOSUS

## 2024-05-28 NOTE — PROGRESS NOTES
Chief Complaint   Patient presents with    Hypertension    Diabetes    Cholesterol Problem    Cough     Had pneumonia last month- seen [facility] for luis fernando WBC but ok at ER     \"Have you been to the ER, urgent care clinic since your last visit?  Hospitalized since your last visit?\"    [facility]    “Have you seen or consulted any other health care providers outside of [facility] since your last visit?”    NO        “Have you had a colorectal cancer screening such as a colonoscopy/FIT/Cologuard?    NO    Date of last Colonoscopy: 2/27/2018  No cologuard on file  No FIT/FOBT on file   No flexible sigmoidoscopy on file         Click Here for Release of Records Request

## 2024-08-19 RX ORDER — AMLODIPINE BESYLATE 5 MG/1
TABLET ORAL
Qty: 90 TABLET | Refills: 1 | Status: SHIPPED | OUTPATIENT
Start: 2024-08-19

## 2024-08-19 NOTE — TELEPHONE ENCOUNTER
PCP: Urbano Augustine MD    Last appt: 5/28/2024     No future appointments.    Requested Prescriptions     Pending Prescriptions Disp Refills    amLODIPine (NORVASC) 5 MG tablet [Pharmacy Med Name: AMLODIPINE BESYLATE 5 MG TAB] 90 tablet 1     Sig: TAKE 1 TABLET BY MOUTH EVERY DAY         Prior labs and Blood pressures:  BP Readings from Last 3 Encounters:   05/28/24 132/74   09/11/23 132/72   05/01/23 134/72     Lab Results   Component Value Date/Time     05/28/2024 04:27 PM    K 4.1 05/28/2024 04:27 PM     05/28/2024 04:27 PM    CO2 31 05/28/2024 04:27 PM    BUN 16 05/28/2024 04:27 PM    GFRAA >60 07/12/2022 04:09 PM       Lab Results   Component Value Date/Time    CHOL 166 05/28/2024 04:27 PM    HDL 62 05/28/2024 04:27 PM    LDL 80.2 05/28/2024 04:27 PM    LDL 75.2 09/11/2023 12:52 PM    VLDL 23.8 05/28/2024 04:27 PM    VLDL 13 11/03/2020 01:54 PM

## 2024-09-06 ENCOUNTER — TELEPHONE (OUTPATIENT)
Age: 74
End: 2024-09-06

## 2024-09-06 NOTE — TELEPHONE ENCOUNTER
Pt came to office to drop off pre op paperwork. Document has been scanned into pt chart and put in provider's mailbox. Pt cand be reached at [phone]

## 2024-09-30 NOTE — TELEPHONE ENCOUNTER
Patient call for refill atorvastatin.  Noted rx sent 5/28/24 for 90 + 1 refill.     Tried contacting patient to advise- No answer and no voice message/recorder.    Contacted [facility], patient has RX that has not been utilized yet from 5/28/24.  Has 6 mo supply available and they are processing for patient.  Thanks, agapito    For Pharmacy Admin Tracking Only    Program: Medication Refill  CPA in place:    Recommendation Provided To:   Intervention Detail: Discontinued Rx: 1, reason: Duplicate Therapy  Intervention Accepted By:   Gap Closed?:    Time Spent (min): 5

## 2024-10-02 ENCOUNTER — OFFICE VISIT (OUTPATIENT)
Age: 74
End: 2024-10-02
Payer: MEDICARE

## 2024-10-02 VITALS
TEMPERATURE: 98 F | DIASTOLIC BLOOD PRESSURE: 78 MMHG | BODY MASS INDEX: 35.03 KG/M2 | HEIGHT: 66 IN | WEIGHT: 218 LBS | RESPIRATION RATE: 16 BRPM | SYSTOLIC BLOOD PRESSURE: 132 MMHG | OXYGEN SATURATION: 97 % | HEART RATE: 69 BPM

## 2024-10-02 DIAGNOSIS — E66.01 SEVERE OBESITY (BMI 35.0-39.9) WITH COMORBIDITY: ICD-10-CM

## 2024-10-02 DIAGNOSIS — G47.33 OBSTRUCTIVE SLEEP APNEA (ADULT) (PEDIATRIC): ICD-10-CM

## 2024-10-02 DIAGNOSIS — E78.5 DYSLIPIDEMIA, GOAL LDL BELOW 100: ICD-10-CM

## 2024-10-02 DIAGNOSIS — E55.9 VITAMIN D DEFICIENCY, UNSPECIFIED: ICD-10-CM

## 2024-10-02 DIAGNOSIS — K21.9 GASTRO-ESOPHAGEAL REFLUX DISEASE WITHOUT ESOPHAGITIS: ICD-10-CM

## 2024-10-02 DIAGNOSIS — J30.1 SEASONAL ALLERGIC RHINITIS DUE TO POLLEN: ICD-10-CM

## 2024-10-02 DIAGNOSIS — K59.01 SLOW TRANSIT CONSTIPATION: ICD-10-CM

## 2024-10-02 DIAGNOSIS — E11.21 TYPE 2 DIABETES MELLITUS WITH NEPHROPATHY (HCC): Primary | ICD-10-CM

## 2024-10-02 DIAGNOSIS — I10 PRIMARY HYPERTENSION: ICD-10-CM

## 2024-10-02 PROCEDURE — 2022F DILAT RTA XM EVC RTNOPTHY: CPT | Performed by: FAMILY MEDICINE

## 2024-10-02 PROCEDURE — G8427 DOCREV CUR MEDS BY ELIG CLIN: HCPCS | Performed by: FAMILY MEDICINE

## 2024-10-02 PROCEDURE — G8399 PT W/DXA RESULTS DOCUMENT: HCPCS | Performed by: FAMILY MEDICINE

## 2024-10-02 PROCEDURE — 1090F PRES/ABSN URINE INCON ASSESS: CPT | Performed by: FAMILY MEDICINE

## 2024-10-02 PROCEDURE — 99214 OFFICE O/P EST MOD 30 MIN: CPT | Performed by: FAMILY MEDICINE

## 2024-10-02 PROCEDURE — 1036F TOBACCO NON-USER: CPT | Performed by: FAMILY MEDICINE

## 2024-10-02 PROCEDURE — 3075F SYST BP GE 130 - 139MM HG: CPT | Performed by: FAMILY MEDICINE

## 2024-10-02 PROCEDURE — 1123F ACP DISCUSS/DSCN MKR DOCD: CPT | Performed by: FAMILY MEDICINE

## 2024-10-02 PROCEDURE — G8484 FLU IMMUNIZE NO ADMIN: HCPCS | Performed by: FAMILY MEDICINE

## 2024-10-02 PROCEDURE — 3052F HG A1C>EQUAL 8.0%<EQUAL 9.0%: CPT | Performed by: FAMILY MEDICINE

## 2024-10-02 PROCEDURE — 3078F DIAST BP <80 MM HG: CPT | Performed by: FAMILY MEDICINE

## 2024-10-02 PROCEDURE — 3017F COLORECTAL CA SCREEN DOC REV: CPT | Performed by: FAMILY MEDICINE

## 2024-10-02 PROCEDURE — G8417 CALC BMI ABV UP PARAM F/U: HCPCS | Performed by: FAMILY MEDICINE

## 2024-10-02 RX ORDER — ATORVASTATIN CALCIUM 40 MG/1
40 TABLET, FILM COATED ORAL DAILY
Qty: 90 TABLET | Refills: 1 | Status: SHIPPED | OUTPATIENT
Start: 2024-10-02

## 2024-10-02 SDOH — ECONOMIC STABILITY: INCOME INSECURITY: HOW HARD IS IT FOR YOU TO PAY FOR THE VERY BASICS LIKE FOOD, HOUSING, MEDICAL CARE, AND HEATING?: NOT VERY HARD

## 2024-10-02 SDOH — ECONOMIC STABILITY: FOOD INSECURITY: WITHIN THE PAST 12 MONTHS, THE FOOD YOU BOUGHT JUST DIDN'T LAST AND YOU DIDN'T HAVE MONEY TO GET MORE.: NEVER TRUE

## 2024-10-02 SDOH — ECONOMIC STABILITY: FOOD INSECURITY: WITHIN THE PAST 12 MONTHS, YOU WORRIED THAT YOUR FOOD WOULD RUN OUT BEFORE YOU GOT MONEY TO BUY MORE.: NEVER TRUE

## 2024-10-02 ASSESSMENT — PATIENT HEALTH QUESTIONNAIRE - PHQ9
SUM OF ALL RESPONSES TO PHQ QUESTIONS 1-9: 0
SUM OF ALL RESPONSES TO PHQ QUESTIONS 1-9: 0
SUM OF ALL RESPONSES TO PHQ9 QUESTIONS 1 & 2: 0
2. FEELING DOWN, DEPRESSED OR HOPELESS: NOT AT ALL
1. LITTLE INTEREST OR PLEASURE IN DOING THINGS: NOT AT ALL
SUM OF ALL RESPONSES TO PHQ QUESTIONS 1-9: 0
SUM OF ALL RESPONSES TO PHQ QUESTIONS 1-9: 0

## 2024-10-02 ASSESSMENT — ENCOUNTER SYMPTOMS
COUGH: 0
EYE REDNESS: 0
WHEEZING: 0
EYE PAIN: 0
SHORTNESS OF BREATH: 0

## 2024-10-02 NOTE — PROGRESS NOTES
Chief Complaint   Patient presents with    Pre-op Exam     Dr Zhou 10-7 [facility] rt eye    Hypertension    Diabetes    Cholesterol Problem      \"Have you been to the ER, urgent care clinic since your last visit?  Hospitalized since your last visit?\"    ER for [facility]    “Have you seen or consulted any other health care providers outside our system since your last visit?”    Dr Argueta      “Have you had a colorectal cancer screening such as a colonoscopy/FIT/Cologuard?    NO    Date of last Colonoscopy: 2/27/2018  No cologuard on file  No FIT/FOBT on file   No flexible sigmoidoscopy on file     “Have you had a diabetic eye exam?”        Date of last diabetic eye exam: 5/16/2023           
ultimate goal. This can be done in the privacy of your home or in a group setting as at the gym  Some prefer having a , others prefer to do exercise in groups or individually.  Do what \"works\" for you. You need to make it simple and \"fun,\" or you most likely will not continue it.     Also, discussed symptoms of concern that were noted today in the note above, treatment options( including doing nothing), when to follow up before recommended time frame. Also, answered all questions.    Her recent glucoses have been around 130 in the day and evening. She hasn't complained of hypoglycemia. She is cleared for her R eye sx on 10/7, but told her to cut her long acting insulin, Toujeo, to 30 UT since she won't be eating on the morning of sx. Also, because of her obstructive sleep apnea she needs to be watched carefully in the post op period.    INathaniel, am scribing for and in the presence of Urbano Augustine MD. 10/2/24/4:27 PM EDT  I have reviewed and agree with the above note and have made corrections where appropriate Urbano Augustine M.D.  Urbano SOUZA MD, personally performed the services described in this documentation as scribed, in my presence, and it is both accurate and complete.

## 2024-10-29 ENCOUNTER — ANESTHESIA (OUTPATIENT)
Facility: HOSPITAL | Age: 74
End: 2024-10-29
Payer: MEDICARE

## 2024-10-29 ENCOUNTER — ANESTHESIA EVENT (OUTPATIENT)
Facility: HOSPITAL | Age: 74
End: 2024-10-29
Payer: MEDICARE

## 2024-10-29 ENCOUNTER — HOSPITAL ENCOUNTER (OUTPATIENT)
Facility: HOSPITAL | Age: 74
Setting detail: OUTPATIENT SURGERY
Discharge: HOME | End: 2024-10-29
Attending: INTERNAL MEDICINE | Admitting: INTERNAL MEDICINE
Payer: MEDICARE

## 2024-10-29 VITALS
WEIGHT: 228.2 LBS | BODY MASS INDEX: 36.67 KG/M2 | TEMPERATURE: 97.4 F | DIASTOLIC BLOOD PRESSURE: 61 MMHG | OXYGEN SATURATION: 97 % | SYSTOLIC BLOOD PRESSURE: 115 MMHG | RESPIRATION RATE: 20 BRPM | HEIGHT: 66 IN | HEART RATE: 88 BPM

## 2024-10-29 LAB
GLUCOSE BLD STRIP.AUTO-MCNC: 68 MG/DL (ref 65–117)
GLUCOSE BLD STRIP.AUTO-MCNC: 87 MG/DL (ref 65–117)
SERVICE CMNT-IMP: NORMAL
SERVICE CMNT-IMP: NORMAL

## 2024-10-29 PROCEDURE — 3600007512: Performed by: INTERNAL MEDICINE

## 2024-10-29 PROCEDURE — 3600007502: Performed by: INTERNAL MEDICINE

## 2024-10-29 PROCEDURE — 7100000010 HC PHASE II RECOVERY - FIRST 15 MIN: Performed by: INTERNAL MEDICINE

## 2024-10-29 PROCEDURE — 6360000002 HC RX W HCPCS: Performed by: NURSE ANESTHETIST, CERTIFIED REGISTERED

## 2024-10-29 PROCEDURE — 2709999900 HC NON-CHARGEABLE SUPPLY: Performed by: INTERNAL MEDICINE

## 2024-10-29 PROCEDURE — 2500000003 HC RX 250 WO HCPCS: Performed by: ANESTHESIOLOGY

## 2024-10-29 PROCEDURE — 3700000000 HC ANESTHESIA ATTENDED CARE: Performed by: INTERNAL MEDICINE

## 2024-10-29 PROCEDURE — 2500000003 HC RX 250 WO HCPCS: Performed by: NURSE ANESTHETIST, CERTIFIED REGISTERED

## 2024-10-29 PROCEDURE — 82962 GLUCOSE BLOOD TEST: CPT

## 2024-10-29 PROCEDURE — 7100000011 HC PHASE II RECOVERY - ADDTL 15 MIN: Performed by: INTERNAL MEDICINE

## 2024-10-29 PROCEDURE — 3700000001 HC ADD 15 MINUTES (ANESTHESIA): Performed by: INTERNAL MEDICINE

## 2024-10-29 RX ORDER — DEXTROSE MONOHYDRATE 25 G/50ML
12.5 INJECTION, SOLUTION INTRAVENOUS PRN
Status: DISCONTINUED | OUTPATIENT
Start: 2024-10-29 | End: 2024-10-29 | Stop reason: HOSPADM

## 2024-10-29 RX ORDER — SODIUM CHLORIDE 0.9 % (FLUSH) 0.9 %
5-40 SYRINGE (ML) INJECTION PRN
Status: DISCONTINUED | OUTPATIENT
Start: 2024-10-29 | End: 2024-10-29 | Stop reason: HOSPADM

## 2024-10-29 RX ORDER — SODIUM CHLORIDE 9 MG/ML
INJECTION, SOLUTION INTRAVENOUS PRN
Status: DISCONTINUED | OUTPATIENT
Start: 2024-10-29 | End: 2024-10-29 | Stop reason: HOSPADM

## 2024-10-29 RX ORDER — DEXTROSE MONOHYDRATE 25 G/50ML
12.5 INJECTION, SOLUTION INTRAVENOUS ONCE
Status: COMPLETED | OUTPATIENT
Start: 2024-10-29 | End: 2024-10-29

## 2024-10-29 RX ORDER — SODIUM CHLORIDE 0.9 % (FLUSH) 0.9 %
5-40 SYRINGE (ML) INJECTION EVERY 12 HOURS SCHEDULED
Status: DISCONTINUED | OUTPATIENT
Start: 2024-10-29 | End: 2024-10-29 | Stop reason: HOSPADM

## 2024-10-29 RX ORDER — SODIUM CHLORIDE 9 MG/ML
INJECTION, SOLUTION INTRAVENOUS CONTINUOUS
Status: DISCONTINUED | OUTPATIENT
Start: 2024-10-29 | End: 2024-10-29 | Stop reason: HOSPADM

## 2024-10-29 RX ADMIN — PROPOFOL 50 MG: 10 INJECTION, EMULSION INTRAVENOUS at 15:04

## 2024-10-29 RX ADMIN — PROPOFOL 50 MG: 10 INJECTION, EMULSION INTRAVENOUS at 15:01

## 2024-10-29 RX ADMIN — PROPOFOL 50 MG: 10 INJECTION, EMULSION INTRAVENOUS at 15:06

## 2024-10-29 RX ADMIN — PROPOFOL 50 MG: 10 INJECTION, EMULSION INTRAVENOUS at 15:03

## 2024-10-29 RX ADMIN — LIDOCAINE HYDROCHLORIDE 20 MG: 20 INJECTION, SOLUTION EPIDURAL; INFILTRATION; INTRACAUDAL; PERINEURAL at 14:58

## 2024-10-29 RX ADMIN — DEXTROSE MONOHYDRATE 12.5 G: 25 INJECTION, SOLUTION INTRAVENOUS at 14:46

## 2024-10-29 RX ADMIN — PROPOFOL 50 MG: 10 INJECTION, EMULSION INTRAVENOUS at 15:00

## 2024-10-29 RX ADMIN — PROPOFOL 25 MG: 10 INJECTION, EMULSION INTRAVENOUS at 14:59

## 2024-10-29 RX ADMIN — PROPOFOL 50 MG: 10 INJECTION, EMULSION INTRAVENOUS at 15:10

## 2024-10-29 RX ADMIN — PROPOFOL 75 MG: 10 INJECTION, EMULSION INTRAVENOUS at 14:58

## 2024-10-29 ASSESSMENT — PAIN - FUNCTIONAL ASSESSMENT: PAIN_FUNCTIONAL_ASSESSMENT: NONE - DENIES PAIN

## 2024-10-29 NOTE — ANESTHESIA POSTPROCEDURE EVALUATION
Department of Anesthesiology  Postprocedure Note    Patient: Rosmery Malloy  MRN: [id number]  YOB: 1950  Date of evaluation: 10/29/2024    Procedure Summary       Date: 10/29/24 Room / Location: [facility] ENDO 03 / [facility] ENDOSCOPY    Anesthesia Start: 1454 Anesthesia Stop: 1518    Procedure: COLONOSCOPY (Lower GI Region) Diagnosis:       Encounter for screening colonoscopy      (Encounter for screening colonoscopy [Z12.11])    Surgeons: Jose Yancey MD Responsible Provider: Dutch Frazier Jr., MD    Anesthesia Type: MAC ASA Status: 3            Anesthesia Type: MAC    Roxy Phase I: Roxy Score: 10    Roxy Phase II:      Anesthesia Post Evaluation    Patient location during evaluation: PACU  Patient participation: complete - patient participated  Level of consciousness: awake  Airway patency: patent  Nausea & Vomiting: no nausea  Cardiovascular status: blood pressure returned to baseline and hemodynamically stable  Respiratory status: acceptable  Hydration status: stable    No notable events documented.

## 2024-10-29 NOTE — PROGRESS NOTES
Endoscopy recovery  Patient returned to baseline, vital signs stable (see vital sign flowsheet). Patient offered liquids and tolerated well. Respiratory status within defined limits. Abdomen soft not tender. Skin with in defined limits. Responsible party driving patient home was given the opportunity to ask questions. Patient discharged with documented belongings. Discharge instructions reviewed and print out given.  Patient verbalized understanding.    Refill requested by: Patient  Phone:  Cell phone number  [phone]  Med:   Pt has several medication that he would like to have sent to his new insurance pharmacy:    Mail order [phone]    [facility] ([facility] - [facility] [address] [place] [address]   P: [phone]   F: [phone]     Meds:     albuterol 108 (90 Base) MCG/ACT inhaler  bud esonide-formoterol (SYMBICORT) 160-4.5 MCG/ACT inhaler  clonazePAM (KLONOPIN) 1 MG tablet  cyclobenzaprine (FLEXERIL) 10 MG tablet  fluoxetine (PROZAC) 40 MG capsule  HYDROmorphone (DILAUDID) 2 MG tablet        Comments: please call to confirm before ordering the new 2020 medications  Patient told to check with pharmacy after 24-48 hours.  Also told they would be notified if there's a problem concerning the refill.

## 2024-10-29 NOTE — PROGRESS NOTES
Initial RN admission and assessment performed and documented in Endoscopy navigator.     Patient evaluated by anesthesia in pre-procedure holding.     All procedural vital signs, airway assessment, and level of consciousness information monitored and recorded by anesthesia staff on the anesthesia record.     Report received from CRNA post procedure.  Patient transported to recovery area by RN.    Endoscopy post procedure time out was performed and specimens were verified with physician.    Endoscope was pre-cleaned at bedside immediately following procedure by dian tech..

## 2024-10-29 NOTE — H&P
74 y.o. female presents for open access colonoscopy for screening.  Additional H&P data will be attached on the day of procedure.    Jose Yancey Jr, MD

## 2024-10-29 NOTE — DISCHARGE INSTRUCTIONS
Given personal history of colon polyps and fair prep recommend repeat colonoscopy in 3 years.     Please let me or my office staff know if you have any feedback about today's procedure.    JENIFER Yancey Jr, MD

## 2024-10-29 NOTE — INTERVAL H&P NOTE
Pre-Endoscopy H&P Update  Chief complaint/HPI/ROS:  The indication for the procedure, the patient's history and the patient's current medications are reviewed prior to the procedure and that data is reported on the H&P to which this document is attached.  Any significant complaints with regard to organ systems will be noted, and if not mentioned then a review of systems is not contributory.  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    DDD (degenerative disc disease)     thoracic and cervical spine    Diverticulosis     hx diverticulosis    Dyslipidemia, goal LDL below 100 9/29/2017    Eczema     Glaucoma     glaucoma    Hiatal hernia     hiatal hernia    Hyperlipidemia     Hypertension     Ill-defined condition     overweight    Ill-defined condition     bulging disc/pinched nerve low back Having surgery CJW 1/22/19    Lichen sclerosus     dx at [facility]    Menopause     LMP-unknown    GREGG (obstructive sleep apnea)     CPAP - does not use regularly    Reflux esophagitis     Situational anxiety 9/29/2017    Stomach ulcer     Type II or unspecified type diabetes mellitus without mention of complication, not stated as uncontrolled       Past Surgical History:   Procedure Laterality Date    BACK SURGERY      x 3 - total of 3 back surgeries as of 2/26/2018/5 back surgeries as of 6/17/2020    COLONOSCOPY N/A 2/27/2018    COLONOSCOPY performed by Jp Blankenship MD at [facility] ENDOSCOPY    COLONOSCOPY  2013    DCMPRN PERQ NUCLEUS PULPOSUS 1/> LEVELS LUMBAR  2003    L3-4    DILATION AND CURETTAGE OF UTERUS      x 3 for abnormal bleeding    GYN      tumors removed from uterus - had cancer cells    HEENT  2002    sinus surgery for snoring & partial Uvelectomy    ORTHOPEDIC SURGERY Left 07/2013    hip replacement    ORTHOPEDIC

## 2024-10-29 NOTE — OP NOTE
Fair prep.  No polyps.  Sigmoid diverticulosis.    Specimens:    * No specimens in log *     Complications:   None; patient tolerated the procedure well.    Impression:  Fair prep.  No polyps.  Sigmoid diverticulosis.    Recommendations:   Given personal history of colon polyps and fair prep recommend repeat colonoscopy in 3 years.    Thank you for entrusting me with this patient's care.  Please do not hesitate to contact me with any questions.    Signed By: Jose Yancey Jr, MD                        October 29, 2024

## 2024-10-29 NOTE — ANESTHESIA PRE PROCEDURE
Department of Anesthesiology  Preprocedure Note       Name:  Rosmery Malloy   Age:  74 y.o.  :  1950                                          MRN:  [id number]         Date:  10/29/2024      Surgeon: Surgeon(s):  Jose Yancey MD    Procedure: Procedure(s):  COLONOSCOPY    Medications prior to admission:   Prior to Admission medications    Medication Sig Start Date End Date Taking? Authorizing Provider   atorvastatin (LIPITOR) 40 MG tablet Take 1 tablet by mouth daily 10/2/24   Urbano Augustine MD   amLODIPine (NORVASC) 5 MG tablet TAKE 1 TABLET BY MOUTH EVERY DAY 24   Urbano Augustine MD   aspirin 81 MG EC tablet Take 1 tablet by mouth daily    ProviderDipika MD   insulin aspart (NOVOLOG FLEXPEN) 100 UNIT/ML injection pen Inject 12-16 Units into the skin Daily with supper    Provider, MD Dipika   tolterodine (DETROL LA) 4 MG extended release capsule Take 1 capsule by mouth daily    ProviderDipika MD   halobetasol (ULTRAVATE) 0.05 % ointment Apply topically as needed 8/14/15   Automatic Reconciliation, Ar   insulin glargine, 1 unit dial, (TOUJEO) 300 UNIT/ML concentrated injection pen Inject into the skin    Automatic Reconciliation, Ar   polyethylene glycol (GLYCOLAX) 17 GM/SCOOP powder Take by mouth    Automatic Reconciliation, Ar       Current medications:    Current Facility-Administered Medications   Medication Dose Route Frequency Provider Last Rate Last Admin   • 0.9 % sodium chloride infusion   IntraVENous Continuous Jose Yancey MD       • sodium chloride flush 0.9 % injection 5-40 mL  5-40 mL IntraVENous 2 times per day Jose Yancey MD       • sodium chloride flush 0.9 % injection 5-40 mL  5-40 mL IntraVENous PRN Jose Yancey MD       • 0.9 % sodium chloride infusion   IntraVENous PRN Jose Yancey MD           Allergies:    Allergies   Allergen Reactions   • Ciprofloxacin Swelling     Pt states she is not allergic to this.   • Famotidine Itching

## 2024-12-11 ENCOUNTER — HOSPITAL ENCOUNTER (OUTPATIENT)
Facility: HOSPITAL | Age: 74
Discharge: HOME | End: 2024-12-14
Attending: OTOLARYNGOLOGY
Payer: MEDICARE

## 2024-12-11 DIAGNOSIS — K21.9 CHALASIA OF LOWER ESOPHAGEAL SPHINCTER: ICD-10-CM

## 2024-12-11 DIAGNOSIS — R13.10 PROBLEMS WITH SWALLOWING AND MASTICATION: ICD-10-CM

## 2024-12-11 PROCEDURE — 74220 X-RAY XM ESOPHAGUS 1CNTRST: CPT

## 2025-02-24 ENCOUNTER — HOSPITAL ENCOUNTER (OUTPATIENT)
Facility: HOSPITAL | Age: 75
Setting detail: OUTPATIENT SURGERY
Discharge: HOME | End: 2025-02-24
Attending: INTERNAL MEDICINE | Admitting: INTERNAL MEDICINE
Payer: MEDICARE

## 2025-02-24 ENCOUNTER — ANESTHESIA (OUTPATIENT)
Facility: HOSPITAL | Age: 75
End: 2025-02-24
Payer: MEDICARE

## 2025-02-24 ENCOUNTER — ANESTHESIA EVENT (OUTPATIENT)
Facility: HOSPITAL | Age: 75
End: 2025-02-24
Payer: MEDICARE

## 2025-02-24 VITALS
OXYGEN SATURATION: 97 % | BODY MASS INDEX: 34.97 KG/M2 | SYSTOLIC BLOOD PRESSURE: 165 MMHG | RESPIRATION RATE: 16 BRPM | HEART RATE: 82 BPM | DIASTOLIC BLOOD PRESSURE: 76 MMHG | WEIGHT: 217.6 LBS | TEMPERATURE: 97.7 F | HEIGHT: 66 IN

## 2025-02-24 LAB
GLUCOSE BLD STRIP.AUTO-MCNC: 125 MG/DL (ref 65–117)
SERVICE CMNT-IMP: ABNORMAL

## 2025-02-24 PROCEDURE — 82962 GLUCOSE BLOOD TEST: CPT

## 2025-02-24 PROCEDURE — 3700000000 HC ANESTHESIA ATTENDED CARE: Performed by: INTERNAL MEDICINE

## 2025-02-24 PROCEDURE — 7100000010 HC PHASE II RECOVERY - FIRST 15 MIN: Performed by: INTERNAL MEDICINE

## 2025-02-24 PROCEDURE — 3600007512: Performed by: INTERNAL MEDICINE

## 2025-02-24 PROCEDURE — 3700000001 HC ADD 15 MINUTES (ANESTHESIA): Performed by: INTERNAL MEDICINE

## 2025-02-24 PROCEDURE — 2720000010 HC SURG SUPPLY STERILE: Performed by: INTERNAL MEDICINE

## 2025-02-24 PROCEDURE — 7100000011 HC PHASE II RECOVERY - ADDTL 15 MIN: Performed by: INTERNAL MEDICINE

## 2025-02-24 PROCEDURE — 2580000003 HC RX 258: Performed by: INTERNAL MEDICINE

## 2025-02-24 PROCEDURE — 3600007502: Performed by: INTERNAL MEDICINE

## 2025-02-24 PROCEDURE — 6360000002 HC RX W HCPCS: Performed by: NURSE ANESTHETIST, CERTIFIED REGISTERED

## 2025-02-24 PROCEDURE — 2709999900 HC NON-CHARGEABLE SUPPLY: Performed by: INTERNAL MEDICINE

## 2025-02-24 RX ORDER — LINACLOTIDE 290 UG/1
30 CAPSULE, GELATIN COATED ORAL
COMMUNITY
Start: 2025-01-24

## 2025-02-24 RX ORDER — SODIUM CHLORIDE 0.9 % (FLUSH) 0.9 %
5-40 SYRINGE (ML) INJECTION PRN
Status: DISCONTINUED | OUTPATIENT
Start: 2025-02-24 | End: 2025-02-24 | Stop reason: HOSPADM

## 2025-02-24 RX ORDER — SODIUM CHLORIDE 9 MG/ML
INJECTION, SOLUTION INTRAVENOUS CONTINUOUS
Status: DISCONTINUED | OUTPATIENT
Start: 2025-02-24 | End: 2025-02-24 | Stop reason: HOSPADM

## 2025-02-24 RX ORDER — SODIUM CHLORIDE 0.9 % (FLUSH) 0.9 %
5-40 SYRINGE (ML) INJECTION EVERY 12 HOURS SCHEDULED
Status: DISCONTINUED | OUTPATIENT
Start: 2025-02-24 | End: 2025-02-24 | Stop reason: HOSPADM

## 2025-02-24 RX ORDER — SODIUM CHLORIDE 9 MG/ML
INJECTION, SOLUTION INTRAVENOUS PRN
Status: DISCONTINUED | OUTPATIENT
Start: 2025-02-24 | End: 2025-02-24 | Stop reason: HOSPADM

## 2025-02-24 RX ORDER — LIDOCAINE HYDROCHLORIDE 20 MG/ML
INJECTION, SOLUTION EPIDURAL; INFILTRATION; INTRACAUDAL; PERINEURAL
Status: DISCONTINUED | OUTPATIENT
Start: 2025-02-24 | End: 2025-02-24 | Stop reason: SDUPTHER

## 2025-02-24 RX ADMIN — PROPOFOL 20 MG: 10 INJECTION, EMULSION INTRAVENOUS at 15:21

## 2025-02-24 RX ADMIN — LIDOCAINE HYDROCHLORIDE 100 MG: 20 INJECTION, SOLUTION EPIDURAL; INFILTRATION; INTRACAUDAL; PERINEURAL at 15:15

## 2025-02-24 RX ADMIN — PROPOFOL 20 MG: 10 INJECTION, EMULSION INTRAVENOUS at 15:24

## 2025-02-24 RX ADMIN — PROPOFOL 20 MG: 10 INJECTION, EMULSION INTRAVENOUS at 15:28

## 2025-02-24 RX ADMIN — PROPOFOL 20 MG: 10 INJECTION, EMULSION INTRAVENOUS at 15:26

## 2025-02-24 RX ADMIN — PROPOFOL 30 MG: 10 INJECTION, EMULSION INTRAVENOUS at 15:17

## 2025-02-24 RX ADMIN — PROPOFOL 20 MG: 10 INJECTION, EMULSION INTRAVENOUS at 15:20

## 2025-02-24 RX ADMIN — PROPOFOL 50 MG: 10 INJECTION, EMULSION INTRAVENOUS at 15:15

## 2025-02-24 RX ADMIN — PROPOFOL 20 MG: 10 INJECTION, EMULSION INTRAVENOUS at 15:19

## 2025-02-24 RX ADMIN — SODIUM CHLORIDE: 9 INJECTION, SOLUTION INTRAVENOUS at 15:10

## 2025-02-24 ASSESSMENT — PAIN - FUNCTIONAL ASSESSMENT: PAIN_FUNCTIONAL_ASSESSMENT: 0-10

## 2025-02-24 ASSESSMENT — PAIN SCALES - GENERAL: PAINLEVEL_OUTOF10: 0

## 2025-02-24 NOTE — H&P
74 y.o. female presents for diagnostic upper endoscopy for dysphagia.  Additional H&P data will be attached on the day of procedure.    Jose Yancey Jr, MD

## 2025-02-24 NOTE — OP NOTE
[facility]  [facility]  JENIFER Yancey Jr, MD  [phone]      2025    Esophagogastroduodenoscopy (EGD) Procedure Note  Rosmery Malloy  : 1950  [facility] Medical Record Number: [id number]      Indications:   Dysphagia  Referring Physician:  Urbano Augustine MD  Anesthesia/Sedation: See Anesthesia Record  Endoscopist:  Dr. JENIFER Yancey Jr  Complications:  None  Estimated Blood Loss:  None    Surgical assistant: Circulator: Patricia Bey RN  Endoscopy Technician: Ara Bocanegra none unless otherwise specified.     Permit:  The indications, risks, benefits and alternatives were reviewed with the patient or their decision maker who was provided an opportunity to ask questions and all questions were answered.  The specific risks of esophagogastroduodenoscopy with conscious sedation were reviewed, including but not limited to anesthetic complication, bleeding, adverse drug reaction, missed lesion, infection, IV site reactions, and intestinal perforation which would lead to the need for surgical repair.  Alternatives to EGD including radiographic imaging, observation without testing, or laboratory testing were reviewed as well as the limitations of those alternatives discussed.  After considering the options and having all their questions answered, the patient or their decision maker provided both verbal and written consent to proceed.       Procedure in Detail:  After obtaining informed consent, positioning of the patient in the left lateral decubitus position, and conduction of a pre-procedure pause or \"time out\" the endoscope was introduced into the mouth and advanced to the duodenum.  A careful inspection was made, and findings or interventions are described below.    Findings:   Esophagus: Distal Schatzki's ring was present.  No erosive esophagitis.  Small 1 cm sliding

## 2025-02-24 NOTE — ANESTHESIA PRE PROCEDURE
sleep apnea:                                  Cardiovascular:  Exercise tolerance: good (>4 METS)  (+) hypertension:, hyperlipidemia    (-)  angina and  FRANCIS        Rate: normal                    Neuro/Psych:               GI/Hepatic/Renal:   (+) GERD:          Endo/Other:    (+) Diabetes, : arthritis:..                 Abdominal:             Vascular:          Other Findings:             Anesthesia Plan      MAC     ASA 2             Anesthetic plan and risks discussed with patient.                        Sofya Chery MD   2/24/2025

## 2025-02-24 NOTE — DISCHARGE INSTRUCTIONS
[facility]  [facility]  JENIFER Nolan MD  [phone]      February 24, 2025     Rosmery Malloy  YOB: 1950    ENDOSCOPY DISCHARGE INSTRUCTIONS    If there is redness at IV site you should apply warm compress to area.  If redness or soreness persist contact Dr. Nolan or your primary care doctor.    Gaseous discomfort may develop, but walking, belching will help relieve this.  You may not operate a vehicle for 12 hours  You may not operate machinery or dangerous appliances for rest of today  You may not drink alcoholic beverages for 12 hours  Avoid making any critical decisions for 24 hours    DIET:  You may resume your normal diet, but some patients find that heavy or large meals may lead to indigestion or vomiting.  I suggest a light meal as first food intake.    MEDICATIONS:  The use of some over-the-counter pain medication may lead to bleeding after biopsies or other procedures you may have had done.  Tylenol (also called acetaminophen) is safe to take and will not lead to bleeding.  Based on the procedure you had today you may safely take aspirin or aspirin-like products for the next seven (7) days.      ACTIVITY:  You may resume your normal household activities, but it is recommended that you spend the remainder of the day resting -  avoid any strenuous activity.     CALL DR. NOLAN'S OFFICE IF:  Increasing pain, nausea, vomiting  Abdominal distension (swelling)  Significant new or increased bleeding (oral or rectal)  Fever/Chills  Chest pain/shortness of breath                   Additional instructions:   Impression: Distal Schatzki's ring dilated to 15 mm with dilation effect present.  No erosive esophagitis.  Small 1 cm sliding hiatal hernia.  Normal duodenum.           Recommendations:  Follow-up response to dilation.  Repeat EGD with dilation as needed.       JENIFER Nolan MD

## 2025-02-24 NOTE — INTERVAL H&P NOTE
Pre-Endoscopy H&P Update  Chief complaint/HPI/ROS:  The indication for the procedure, the patient's history and the patient's current medications are reviewed prior to the procedure and that data is reported on the H&P to which this document is attached.  Any significant complaints with regard to organ systems will be noted, and if not mentioned then a review of systems is not contributory.  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    DDD (degenerative disc disease)     thoracic and cervical spine    Diverticulosis     hx diverticulosis    Dyslipidemia, goal LDL below 100 9/29/2017    Eczema     Glaucoma     glaucoma    Hiatal hernia     hiatal hernia    Hyperlipidemia     Hypertension     Ill-defined condition     overweight    Ill-defined condition     bulging disc/pinched nerve low back Having surgery [facility] 1/22/19    Lichen sclerosus     dx at [facility]    Menopause     LMP-unknown    GREGG (obstructive sleep apnea)     CPAP - does not use regularly    Reflux esophagitis     Situational anxiety 9/29/2017    Stomach ulcer     Type II or unspecified type diabetes mellitus without mention of complication, not stated as uncontrolled       Past Surgical History:   Procedure Laterality Date    BACK SURGERY      x 3 - total of 3 back surgeries as of 2/26/2018/5 back surgeries as of 6/17/2020    COLONOSCOPY N/A 2/27/2018    COLONOSCOPY performed by Jp Blankenship MD at [facility] ENDOSCOPY    COLONOSCOPY  2013    COLONOSCOPY N/A 10/29/2024    COLONOSCOPY performed by Jose Yancey MD at [facility] ENDOSCOPY    DCMPRN PERQ NUCLEUS PULPOSUS 1/> LEVELS LUMBAR  2003    L3-4    DILATION AND CURETTAGE OF UTERUS      x 3 for abnormal bleeding    GYN      tumors removed from uterus - had cancer cells    HEENT  2002    sinus surgery for

## 2025-02-25 NOTE — ANESTHESIA POSTPROCEDURE EVALUATION
Department of Anesthesiology  Postprocedure Note    Patient: Rosmery Malloy  MRN: [id number]  YOB: 1950  Date of evaluation: 2/25/2025    Procedure Summary       Date: 02/24/25 Room / Location: [facility] 02 / [facility] ENDOSCOPY    Anesthesia Start: 1510 Anesthesia Stop: 1534    Procedure: ESOPHAGOGASTRODUODENOSCOPY (Upper GI Region) Diagnosis:       Esophageal dysphagia      (Esophageal dysphagia [R13.19])    Surgeons: Jose Yancey MD Responsible Provider: Sofya Chery MD    Anesthesia Type: MAC ASA Status: 2            Anesthesia Type: MAC    Roxy Phase I: Roxy Score: 10    Roxy Phase II: Roxy Score: 10    Anesthesia Post Evaluation    No notable events documented.

## 2025-04-09 NOTE — TELEPHONE ENCOUNTER
Last appointment: 10/2/24 Augustine  Next appointment: None- Needs new provider- Please contact patient to schedule  Previous refill encounter(s): 8/19/24 90 + 1    Requested Prescriptions     Pending Prescriptions Disp Refills    amLODIPine (NORVASC) 5 MG tablet 90 tablet 0     Sig: Take 1 tablet by mouth daily     For Pharmacy Admin Tracking Only    Program: Medication Refill  CPA in place:    Recommendation Provided To:   Intervention Detail: New Rx: 1, reason: Patient Preference  Intervention Accepted By:   Gap Closed?:    Time Spent (min): 5

## 2025-04-16 NOTE — TELEPHONE ENCOUNTER
Left detailed message to callback to get re-established with a New Provider,since  retired.I also let he know that we received a request for her    amLODIPine (NORVASC) 5 MG tablet and that it cannot be filled until she re-establishes' care.

## 2025-04-24 NOTE — TELEPHONE ENCOUNTER
Left detailed message to callback to get re-established with a New Provider,since  retired.I also let he know that we received a request for her    amLODIPine (NORVASC) 5 MG tablet and that it cannot be filled until she re-establishes' care                 I have left the pt several message's with no response on scheduling an appt to get her medication filled.

## 2025-04-25 RX ORDER — AMLODIPINE BESYLATE 5 MG/1
5 TABLET ORAL DAILY
Qty: 90 TABLET | Refills: 0 | OUTPATIENT
Start: 2025-04-25

## 2025-05-10 LAB
ESTIMATED AVERAGE GLUCOSE: ABNORMAL
HBA1C MFR BLD: 8.7 %

## 2025-05-16 ENCOUNTER — OFFICE VISIT (OUTPATIENT)
Age: 75
End: 2025-05-16
Payer: MEDICARE

## 2025-05-16 VITALS
DIASTOLIC BLOOD PRESSURE: 76 MMHG | TEMPERATURE: 97.8 F | RESPIRATION RATE: 16 BRPM | OXYGEN SATURATION: 98 % | HEART RATE: 97 BPM | BODY MASS INDEX: 34.97 KG/M2 | SYSTOLIC BLOOD PRESSURE: 129 MMHG | WEIGHT: 217.6 LBS | HEIGHT: 66 IN

## 2025-05-16 DIAGNOSIS — M54.50 CHRONIC LEFT-SIDED LOW BACK PAIN WITHOUT SCIATICA: ICD-10-CM

## 2025-05-16 DIAGNOSIS — E78.5 DYSLIPIDEMIA, GOAL LDL BELOW 70: ICD-10-CM

## 2025-05-16 DIAGNOSIS — I10 PRIMARY HYPERTENSION: Primary | ICD-10-CM

## 2025-05-16 DIAGNOSIS — G89.29 CHRONIC LEFT-SIDED LOW BACK PAIN WITHOUT SCIATICA: ICD-10-CM

## 2025-05-16 DIAGNOSIS — E11.21 TYPE 2 DIABETES MELLITUS WITH NEPHROPATHY (HCC): ICD-10-CM

## 2025-05-16 DIAGNOSIS — E78.5 DYSLIPIDEMIA, GOAL LDL BELOW 100: ICD-10-CM

## 2025-05-16 PROCEDURE — 3017F COLORECTAL CA SCREEN DOC REV: CPT | Performed by: NURSE PRACTITIONER

## 2025-05-16 PROCEDURE — 1126F AMNT PAIN NOTED NONE PRSNT: CPT | Performed by: NURSE PRACTITIONER

## 2025-05-16 PROCEDURE — 99214 OFFICE O/P EST MOD 30 MIN: CPT | Performed by: NURSE PRACTITIONER

## 2025-05-16 PROCEDURE — G8399 PT W/DXA RESULTS DOCUMENT: HCPCS | Performed by: NURSE PRACTITIONER

## 2025-05-16 PROCEDURE — 2022F DILAT RTA XM EVC RTNOPTHY: CPT | Performed by: NURSE PRACTITIONER

## 2025-05-16 PROCEDURE — 1159F MED LIST DOCD IN RCRD: CPT | Performed by: NURSE PRACTITIONER

## 2025-05-16 PROCEDURE — 3078F DIAST BP <80 MM HG: CPT | Performed by: NURSE PRACTITIONER

## 2025-05-16 PROCEDURE — 1090F PRES/ABSN URINE INCON ASSESS: CPT | Performed by: NURSE PRACTITIONER

## 2025-05-16 PROCEDURE — G8417 CALC BMI ABV UP PARAM F/U: HCPCS | Performed by: NURSE PRACTITIONER

## 2025-05-16 PROCEDURE — 1036F TOBACCO NON-USER: CPT | Performed by: NURSE PRACTITIONER

## 2025-05-16 PROCEDURE — G8427 DOCREV CUR MEDS BY ELIG CLIN: HCPCS | Performed by: NURSE PRACTITIONER

## 2025-05-16 PROCEDURE — 1123F ACP DISCUSS/DSCN MKR DOCD: CPT | Performed by: NURSE PRACTITIONER

## 2025-05-16 PROCEDURE — 3046F HEMOGLOBIN A1C LEVEL >9.0%: CPT | Performed by: NURSE PRACTITIONER

## 2025-05-16 PROCEDURE — 3074F SYST BP LT 130 MM HG: CPT | Performed by: NURSE PRACTITIONER

## 2025-05-16 PROCEDURE — 1160F RVW MEDS BY RX/DR IN RCRD: CPT | Performed by: NURSE PRACTITIONER

## 2025-05-16 RX ORDER — ATORVASTATIN CALCIUM 40 MG/1
40 TABLET, FILM COATED ORAL DAILY
Qty: 90 TABLET | Refills: 1 | Status: SHIPPED | OUTPATIENT
Start: 2025-05-16

## 2025-05-16 RX ORDER — AMLODIPINE BESYLATE 5 MG/1
5 TABLET ORAL DAILY
Qty: 90 TABLET | Refills: 1 | Status: SHIPPED | OUTPATIENT
Start: 2025-05-16

## 2025-05-16 SDOH — ECONOMIC STABILITY: FOOD INSECURITY: WITHIN THE PAST 12 MONTHS, THE FOOD YOU BOUGHT JUST DIDN'T LAST AND YOU DIDN'T HAVE MONEY TO GET MORE.: NEVER TRUE

## 2025-05-16 SDOH — ECONOMIC STABILITY: FOOD INSECURITY: WITHIN THE PAST 12 MONTHS, YOU WORRIED THAT YOUR FOOD WOULD RUN OUT BEFORE YOU GOT MONEY TO BUY MORE.: NEVER TRUE

## 2025-05-16 ASSESSMENT — PATIENT HEALTH QUESTIONNAIRE - PHQ9
SUM OF ALL RESPONSES TO PHQ QUESTIONS 1-9: 0
2. FEELING DOWN, DEPRESSED OR HOPELESS: NOT AT ALL
1. LITTLE INTEREST OR PLEASURE IN DOING THINGS: NOT AT ALL
SUM OF ALL RESPONSES TO PHQ QUESTIONS 1-9: 0

## 2025-05-16 NOTE — PROGRESS NOTES
Chief Complaint   Patient presents with    Establish Care     Previous PCP: Dr. Urbano Augustine -notes in chart    Medication Refill     Patient requesting refill on amlodipine      \"Have you been to the ER, urgent care clinic since your last visit?  Hospitalized since your last visit?\"    NO    “Have you seen or consulted any other health care providers outside of [facility] since your last visit?”    NO                   5/16/2025     3:42 PM   PHQ-9    Little interest or pleasure in doing things 0   Feeling down, depressed, or hopeless 0   PHQ-2 Score 0   PHQ-9 Total Score 0           Financial Resource Strain: Low Risk  (10/2/2024)    Overall Financial Resource Strain (CARDIA)     Difficulty of Paying Living Expenses: Not very hard      Food Insecurity: No Food Insecurity (5/16/2025)    Hunger Vital Sign     Worried About Running Out of Food in the Last Year: Never true     Ran Out of Food in the Last Year: Never true          Health Maintenance Due   Topic Date Due    Shingles vaccine (1 of 2) Never done    DTaP/Tdap/Td vaccine (2 - Tdap) 10/25/2010    Respiratory Syncytial Virus (RSV) Pregnant or age 60 yrs+ (1 - Risk 60-74 years 1-dose series) Never done    Annual Wellness Visit (Medicare)  03/20/2024    Diabetic retinal exam  05/16/2024    COVID-19 Vaccine (3 - 2024-25 season) 09/01/2024    Diabetic Alb to Cr ratio (uACR) test  09/11/2024    A1C test (Diabetic or Prediabetic)  05/28/2025    Lipids  05/28/2025    GFR test (Diabetes, CKD 3-4, OR last GFR 15-59)  05/28/2025

## 2025-05-16 NOTE — PROGRESS NOTES
History of Present Illness  Rosmery Malloy is a 74 y.o. female presenting for care establishment and hypertension follow-up. PMH includes hypertension, GREGG, GERD, type 2 diabetes with nephropathy (insulin-dependent), cervical spondylosis without myelopathy, dyslipidemia, glaucoma, lichen sclerosis, situational anxiety, and vitamin D deficiency.    Type 2 Diabetes  - Under endocrinologist care  - Discontinued metformin due to nausea and dialysis concerns (sister's experience)  - Declines Mounjaro due to side effects  - Recent A1c increased to 8  - Lifestyle changes impacting diabetes management  - Attempts physical activity but limited by leg muscle tightness  - Informed of 0.5-inch leg length discrepancy, considering heel lift and motorized chair  - Reports slight dyspnea attributed to sedentary lifestyle    GERD  - Experiences nocturnal acid reflux  - Managed by bed elevation and meal timing adjustments  - Underwent upper endoscopy; advised acid suppression medication to prevent esophageal stricture  - History of hiatal hernia, avoids exacerbating foods    Allergies  - Severe allergies with drainage, coughing, and sneezing, especially with pollen exposure  - Self-medicating with NyQuil, affecting blood sugar levels    Urinary Leakage  - History of uterine cancer, underwent hysterectomy  - Experiences urinary leakage during coughing/sneezing  - Considering sling procedure    Hypertension  - On amlodipine 5 mg daily and blood pressure appears well controlled.    Supplemental information: PAST SURGICAL HISTORY:  - Shoulder surgery  - Bladder biopsy  - Hysterectomy    FAMILY HISTORY  Sister had kidney issues, almost required dialysis.      Review of Systems   Constitutional:  Negative for activity change and appetite change.   HENT:  Positive for congestion, postnasal drip and rhinorrhea.    Respiratory:  Negative for cough and shortness of breath.    Cardiovascular:  Negative for chest pain.   All other systems

## 2025-05-16 NOTE — PATIENT INSTRUCTIONS
cetirizine (Zyrtec) 5 mg and take this nightly.    I would also take famotidine (Pepcid) 20 mg nightly. You can use the Pepcid twice daily as needed.

## 2025-05-17 LAB
ALBUMIN SERPL-MCNC: 3.7 G/DL (ref 3.5–5)
ALBUMIN/GLOB SERPL: 0.9 (ref 1.1–2.2)
ALP SERPL-CCNC: 158 U/L (ref 45–117)
ALT SERPL-CCNC: 27 U/L (ref 12–78)
ANION GAP SERPL CALC-SCNC: 6 MMOL/L (ref 2–12)
AST SERPL-CCNC: 22 U/L (ref 15–37)
BILIRUB SERPL-MCNC: 0.2 MG/DL (ref 0.2–1)
BUN SERPL-MCNC: 13 MG/DL (ref 6–20)
BUN/CREAT SERPL: 12 (ref 12–20)
CALCIUM SERPL-MCNC: 9.9 MG/DL (ref 8.5–10.1)
CHLORIDE SERPL-SCNC: 105 MMOL/L (ref 97–108)
CHOLEST SERPL-MCNC: 168 MG/DL
CO2 SERPL-SCNC: 28 MMOL/L (ref 21–32)
CREAT SERPL-MCNC: 1.09 MG/DL (ref 0.55–1.02)
ERYTHROCYTE [DISTWIDTH] IN BLOOD BY AUTOMATED COUNT: 16.8 % (ref 11.5–14.5)
GLOBULIN SER CALC-MCNC: 4.3 G/DL (ref 2–4)
GLUCOSE SERPL-MCNC: 148 MG/DL (ref 65–100)
HCT VFR BLD AUTO: 36.8 % (ref 35–47)
HDLC SERPL-MCNC: 53 MG/DL
HDLC SERPL: 3.2 (ref 0–5)
HGB BLD-MCNC: 11.1 G/DL (ref 11.5–16)
LDLC SERPL CALC-MCNC: 87.6 MG/DL (ref 0–100)
MCH RBC QN AUTO: 24.1 PG (ref 26–34)
MCHC RBC AUTO-ENTMCNC: 30.2 G/DL (ref 30–36.5)
MCV RBC AUTO: 80 FL (ref 80–99)
NRBC # BLD: 0 K/UL (ref 0–0.01)
NRBC BLD-RTO: 0 PER 100 WBC
PLATELET # BLD AUTO: 322 K/UL (ref 150–400)
PMV BLD AUTO: 10.8 FL (ref 8.9–12.9)
POTASSIUM SERPL-SCNC: 3.8 MMOL/L (ref 3.5–5.1)
PROT SERPL-MCNC: 8 G/DL (ref 6.4–8.2)
RBC # BLD AUTO: 4.6 M/UL (ref 3.8–5.2)
SODIUM SERPL-SCNC: 139 MMOL/L (ref 136–145)
TRIGL SERPL-MCNC: 137 MG/DL
VLDLC SERPL CALC-MCNC: 27.4 MG/DL
WBC # BLD AUTO: 11.9 K/UL (ref 3.6–11)

## 2025-05-18 ENCOUNTER — RESULTS FOLLOW-UP (OUTPATIENT)
Age: 75
End: 2025-05-18

## 2025-05-18 NOTE — RESULT ENCOUNTER NOTE
Hi Ms. Malloy, I hope you are doing well, I have your labs:    - Electrolytes are normal. Kidney function is stable. Liver tests are normal overall.    - You are slightly anemic (losing a little blood). I looked at your colonoscopy and upper endoscopy reports. I would discuss this with your GI provider at follow up and see if they will perform a capsule study (This is a capsule you swallow and takes pictures of your small intestine, looking for blood).    - Cholesterol is pretty well controlled on your atorvastatin.    Take care

## 2025-05-22 ENCOUNTER — HOSPITAL ENCOUNTER (OUTPATIENT)
Facility: HOSPITAL | Age: 75
Setting detail: RECURRING SERIES
Discharge: HOME | End: 2025-05-25
Payer: MEDICARE

## 2025-05-22 PROCEDURE — 97161 PT EVAL LOW COMPLEX 20 MIN: CPT

## 2025-05-22 PROCEDURE — 97110 THERAPEUTIC EXERCISES: CPT

## 2025-05-22 NOTE — THERAPY EVALUATION
Physical Therapy at [facility],   a part of [facility]  [address] [facility]  [place], [place] [address]  Phone:[phone] Fax:[phone]                                                                            PHYSICAL THERAPY - MEDICARE EVALUATION/PLAN OF CARE NOTE (updated 3/23)      Date: 2025          Patient Name:  Rosmery Malloy :  1950   Medical   Diagnosis:  Chronic left-sided low back pain without sciatica [M54.50, G89.29] Treatment Diagnosis:  M54.59  OTHER LOWER BACK PAIN    Referral Source:  Gen Sanders APRN -* Provider #:  [id number]                  Insurance: Payor: MEDICARE / Plan: MEDICARE PART A AND B / Product Type: *No Product type* /      Patient  verified yes     Visit #   Current  / Total 1 24   Time   In / Out 1100 a 1140   Total Treatment Time 40   Total Timed Codes 15   1:1 Treatment Time 40       BC Totals Reminder:  bill using total billable   min of TIMED therapeutic procedures and modalities.   8-22 min = 1 unit; 23-37 min = 2 units; 38-52 min = 3 units;  53-67 min = 4 units; 68-82 min = 5 units           SUBJECTIVE  Pain Level (0-10 scale): 8  []constant []intermittent []improving []worsening []no change since onset    Any medication changes, allergies to medications, adverse drug reactions, diagnosis change, or new procedure performed?: [x] No    [] Yes (see summary sheet for update)  Medications: Verified on Patient Summary List    Subjective functional status/changes:     Patient reports L low back/ hip has been bothering her from years but has been getting worse. Hx of B RANDI as well as multiple lumbar surgeries in the past. Cannot stand or walk for prolonged periods of time. Feels better walking with the shopping cart. Was told by a chiropractor she has a LLD. Has had injections which helped the first time but did not help the second time. Can walk about 10 min before she needs to sit. No radiating sx into the legs but does

## 2025-05-29 ENCOUNTER — HOSPITAL ENCOUNTER (OUTPATIENT)
Facility: HOSPITAL | Age: 75
Setting detail: RECURRING SERIES
End: 2025-05-29
Payer: MEDICARE

## 2025-06-02 ENCOUNTER — OFFICE VISIT (OUTPATIENT)
Age: 75
End: 2025-06-02
Payer: MEDICARE

## 2025-06-02 VITALS
TEMPERATURE: 97.1 F | OXYGEN SATURATION: 95 % | RESPIRATION RATE: 18 BRPM | SYSTOLIC BLOOD PRESSURE: 120 MMHG | BODY MASS INDEX: 34.87 KG/M2 | HEIGHT: 66 IN | HEART RATE: 92 BPM | DIASTOLIC BLOOD PRESSURE: 71 MMHG | WEIGHT: 217 LBS

## 2025-06-02 DIAGNOSIS — I87.2 VENOUS STASIS DERMATITIS: ICD-10-CM

## 2025-06-02 DIAGNOSIS — S91.311A LACERATION OF RIGHT FOOT, INITIAL ENCOUNTER: ICD-10-CM

## 2025-06-02 DIAGNOSIS — R60.0 BILATERAL LOWER EXTREMITY EDEMA: Primary | ICD-10-CM

## 2025-06-02 DIAGNOSIS — E11.21 TYPE 2 DIABETES MELLITUS WITH NEPHROPATHY (HCC): ICD-10-CM

## 2025-06-02 PROCEDURE — 1123F ACP DISCUSS/DSCN MKR DOCD: CPT | Performed by: NURSE PRACTITIONER

## 2025-06-02 PROCEDURE — 99214 OFFICE O/P EST MOD 30 MIN: CPT | Performed by: NURSE PRACTITIONER

## 2025-06-02 PROCEDURE — 3074F SYST BP LT 130 MM HG: CPT | Performed by: NURSE PRACTITIONER

## 2025-06-02 PROCEDURE — 3046F HEMOGLOBIN A1C LEVEL >9.0%: CPT | Performed by: NURSE PRACTITIONER

## 2025-06-02 PROCEDURE — 1160F RVW MEDS BY RX/DR IN RCRD: CPT | Performed by: NURSE PRACTITIONER

## 2025-06-02 PROCEDURE — 3017F COLORECTAL CA SCREEN DOC REV: CPT | Performed by: NURSE PRACTITIONER

## 2025-06-02 PROCEDURE — G8427 DOCREV CUR MEDS BY ELIG CLIN: HCPCS | Performed by: NURSE PRACTITIONER

## 2025-06-02 PROCEDURE — 1090F PRES/ABSN URINE INCON ASSESS: CPT | Performed by: NURSE PRACTITIONER

## 2025-06-02 PROCEDURE — 1159F MED LIST DOCD IN RCRD: CPT | Performed by: NURSE PRACTITIONER

## 2025-06-02 PROCEDURE — 2022F DILAT RTA XM EVC RTNOPTHY: CPT | Performed by: NURSE PRACTITIONER

## 2025-06-02 PROCEDURE — G8399 PT W/DXA RESULTS DOCUMENT: HCPCS | Performed by: NURSE PRACTITIONER

## 2025-06-02 PROCEDURE — G8417 CALC BMI ABV UP PARAM F/U: HCPCS | Performed by: NURSE PRACTITIONER

## 2025-06-02 PROCEDURE — 1125F AMNT PAIN NOTED PAIN PRSNT: CPT | Performed by: NURSE PRACTITIONER

## 2025-06-02 PROCEDURE — 3078F DIAST BP <80 MM HG: CPT | Performed by: NURSE PRACTITIONER

## 2025-06-02 PROCEDURE — 1036F TOBACCO NON-USER: CPT | Performed by: NURSE PRACTITIONER

## 2025-06-02 RX ORDER — FUROSEMIDE 20 MG/1
20 TABLET ORAL DAILY
Qty: 30 TABLET | Refills: 1 | Status: SHIPPED | OUTPATIENT
Start: 2025-06-02

## 2025-06-02 ASSESSMENT — ENCOUNTER SYMPTOMS
RESPIRATORY NEGATIVE: 1
COUGH: 0
SHORTNESS OF BREATH: 0

## 2025-06-02 NOTE — ASSESSMENT & PLAN NOTE
Last A1c mildly elevated. No recent A1C in chart. Reviewed diabetic diet and carbohydrate restriction.  Continue current medications and follow-up with endocrinologist as scheduled.

## 2025-06-02 NOTE — PROGRESS NOTES
Subjective:      Patient ID: Rosmery Malloy is a 74 y.o. female     HPI  C/o swelling BLE on and off over past few months.  Symptoms worsened last week after taking a bus ride to and from [place].  Admits she has been eating a lot of salty foods. Also having some itching of BLE.  Using moisturizing cream with some relief.    Denies history of heart disease, CHF.    Sustained a small laceration in between her great toe and first toe of right foot a few days ago when walking barefoot.  Has been applying neosporin to the wound.  PMH significant for diabetes.       Patient Active Problem List   Diagnosis    Lichen sclerosus    Colon adenoma    Type 2 diabetes mellitus with nephropathy (HCC)    Allergic rhinitis    Hypertension    ACP (advance care planning)    Type 2 diabetes mellitus without complication, with long-term current use of insulin ([facility])    Situational anxiety    GREGG (obstructive sleep apnea)    Intestinal disaccharidase deficiencies and disaccharide malabsorption    History of glaucoma    Gastroesophageal reflux disease without esophagitis    Cervical spondylosis without myelopathy    Reflux esophagitis    Dyslipidemia, goal LDL below 100    Vitamin D deficiency    Postmenopausal bleeding     Current Outpatient Medications   Medication Sig    furosemide (LASIX) 20 MG tablet Take 1 tablet by mouth daily    Cholecalciferol 50 MCG (2000 UT) TABS     amLODIPine (NORVASC) 5 MG tablet Take 1 tablet by mouth daily    atorvastatin (LIPITOR) 40 MG tablet Take 1 tablet by mouth daily    aspirin 81 MG EC tablet Take 1 tablet by mouth daily    insulin aspart (NOVOLOG FLEXPEN) 100 UNIT/ML injection pen Inject 12-16 Units into the skin Daily with supper    insulin glargine, 1 unit dial, (TOUJEO) 300 UNIT/ML concentrated injection pen Inject into the skin    linaclotide (LINZESS) 290 MCG CAPS capsule 30 capsules (Patient not taking: Reported on 2/24/2025)     No current facility-administered medications for this

## 2025-06-02 NOTE — PROGRESS NOTES
Chief Complaint   Patient presents with    Foot Swelling     K6ungopy, both legs swollen from knee down, slight discoloration. Skin very tight, Sore spot between big toe  and next toe on right foot.         \"Have you been to the ER, urgent care clinic since your last visit?  Hospitalized since your last visit?\"    NO    “Have you seen or consulted any other health care providers outside of [facility] since your last visit?”    NO            Click Here for Release of Records Request           5/16/2025     3:42 PM   PHQ-9    Little interest or pleasure in doing things 0   Feeling down, depressed, or hopeless 0   PHQ-2 Score 0   PHQ-9 Total Score 0           Financial Resource Strain: Low Risk  (10/2/2024)    Overall Financial Resource Strain (CARDIA)     Difficulty of Paying Living Expenses: Not very hard      Food Insecurity: No Food Insecurity (5/16/2025)    Hunger Vital Sign     Worried About Running Out of Food in the Last Year: Never true     Ran Out of Food in the Last Year: Never true          Health Maintenance Due   Topic Date Due    Shingles vaccine (1 of 2) Never done    DTaP/Tdap/Td vaccine (2 - Tdap) 10/25/2010    Respiratory Syncytial Virus (RSV) Pregnant or age 60 yrs+ (1 - Risk 60-74 years 1-dose series) Never done    Annual Wellness Visit (Medicare)  03/20/2024    Diabetic retinal exam  05/16/2024    COVID-19 Vaccine (3 - 2024-25 season) 09/01/2024    Diabetic Alb to Cr ratio (uACR) test  09/11/2024    A1C test (Diabetic or Prediabetic)  05/28/2025

## 2025-06-25 DIAGNOSIS — R60.0 BILATERAL LOWER EXTREMITY EDEMA: ICD-10-CM

## 2025-06-25 RX ORDER — FUROSEMIDE 20 MG/1
20 TABLET ORAL DAILY
Qty: 90 TABLET | Refills: 0 | Status: SHIPPED | OUTPATIENT
Start: 2025-06-25

## 2025-06-30 ENCOUNTER — TELEPHONE (OUTPATIENT)
Age: 75
End: 2025-06-30

## 2025-07-01 ENCOUNTER — OFFICE VISIT (OUTPATIENT)
Age: 75
End: 2025-07-01
Payer: MEDICARE

## 2025-07-01 VITALS
BODY MASS INDEX: 34.39 KG/M2 | WEIGHT: 214 LBS | RESPIRATION RATE: 18 BRPM | TEMPERATURE: 97.1 F | HEART RATE: 80 BPM | SYSTOLIC BLOOD PRESSURE: 125 MMHG | DIASTOLIC BLOOD PRESSURE: 73 MMHG | HEIGHT: 66 IN | OXYGEN SATURATION: 100 %

## 2025-07-01 DIAGNOSIS — L29.9 PRURITUS DUE TO INFLAMMATION: ICD-10-CM

## 2025-07-01 DIAGNOSIS — L03.115 CELLULITIS OF RIGHT LOWER EXTREMITY: ICD-10-CM

## 2025-07-01 DIAGNOSIS — B35.3 TINEA PEDIS OF BOTH FEET: Primary | ICD-10-CM

## 2025-07-01 PROCEDURE — 3078F DIAST BP <80 MM HG: CPT | Performed by: NURSE PRACTITIONER

## 2025-07-01 PROCEDURE — 1123F ACP DISCUSS/DSCN MKR DOCD: CPT | Performed by: NURSE PRACTITIONER

## 2025-07-01 PROCEDURE — G8399 PT W/DXA RESULTS DOCUMENT: HCPCS | Performed by: NURSE PRACTITIONER

## 2025-07-01 PROCEDURE — 1125F AMNT PAIN NOTED PAIN PRSNT: CPT | Performed by: NURSE PRACTITIONER

## 2025-07-01 PROCEDURE — G8417 CALC BMI ABV UP PARAM F/U: HCPCS | Performed by: NURSE PRACTITIONER

## 2025-07-01 PROCEDURE — 99214 OFFICE O/P EST MOD 30 MIN: CPT | Performed by: NURSE PRACTITIONER

## 2025-07-01 PROCEDURE — 3074F SYST BP LT 130 MM HG: CPT | Performed by: NURSE PRACTITIONER

## 2025-07-01 PROCEDURE — G8427 DOCREV CUR MEDS BY ELIG CLIN: HCPCS | Performed by: NURSE PRACTITIONER

## 2025-07-01 PROCEDURE — 1036F TOBACCO NON-USER: CPT | Performed by: NURSE PRACTITIONER

## 2025-07-01 PROCEDURE — 1159F MED LIST DOCD IN RCRD: CPT | Performed by: NURSE PRACTITIONER

## 2025-07-01 PROCEDURE — 3017F COLORECTAL CA SCREEN DOC REV: CPT | Performed by: NURSE PRACTITIONER

## 2025-07-01 PROCEDURE — 1090F PRES/ABSN URINE INCON ASSESS: CPT | Performed by: NURSE PRACTITIONER

## 2025-07-01 RX ORDER — CLOTRIMAZOLE 1 %
CREAM (GRAM) TOPICAL
Qty: 113 G | Refills: 1 | Status: SHIPPED | OUTPATIENT
Start: 2025-07-01 | End: 2025-07-08

## 2025-07-01 RX ORDER — HYDROXYZINE HYDROCHLORIDE 25 MG/1
25-50 TABLET, FILM COATED ORAL EVERY 8 HOURS PRN
Qty: 60 TABLET | Refills: 0 | Status: SHIPPED | OUTPATIENT
Start: 2025-07-01 | End: 2025-07-11

## 2025-07-01 NOTE — PATIENT INSTRUCTIONS
I would recommend a probiotic while on the antibiotic. This will protect you from diarrhea caused by antibiotics.    I would use Mucinex to help thin your secretions so that you can cough them up.    You can also use dextromethorphan (Delsym) to help suppress the cough.

## 2025-07-01 NOTE — PROGRESS NOTES
Chief Complaint   Patient presents with    Joint Swelling     Bilateral low leg edema - mainly in ankles/feet - \"skin feels very tight\" skin is extremely dry, cracked and itchy - uses lotion, but skin is still very tight and rough - worse at night      \"Have you been to the ER, urgent care clinic since your last visit?  Hospitalized since your last visit?\"    NO    “Have you seen or consulted any other health care providers outside of [facility] since your last visit?”    NO                   5/16/2025     3:42 PM   PHQ-9    Little interest or pleasure in doing things 0   Feeling down, depressed, or hopeless 0   PHQ-2 Score 0   PHQ-9 Total Score 0           Financial Resource Strain: Low Risk  (10/2/2024)    Overall Financial Resource Strain (CARDIA)     Difficulty of Paying Living Expenses: Not very hard      Food Insecurity: No Food Insecurity (5/16/2025)    Hunger Vital Sign     Worried About Running Out of Food in the Last Year: Never true     Ran Out of Food in the Last Year: Never true          Health Maintenance Due   Topic Date Due    Shingles vaccine (1 of 2) Never done    DTaP/Tdap/Td vaccine (2 - Tdap) 10/25/2010    Respiratory Syncytial Virus (RSV) Pregnant or age 60 yrs+ (1 - Risk 60-74 years 1-dose series) Never done    Annual Wellness Visit (Medicare)  03/20/2024    Diabetic retinal exam  05/16/2024    COVID-19 Vaccine (3 - 2024-25 season) 09/01/2024    Diabetic Alb to Cr ratio (uACR) test  09/11/2024    A1C test (Diabetic or Prediabetic)  05/28/2025

## 2025-07-02 LAB
ANION GAP SERPL CALC-SCNC: 8 MMOL/L (ref 2–12)
BASOPHILS # BLD: 0.05 K/UL (ref 0–0.1)
BASOPHILS NFR BLD: 0.4 % (ref 0–1)
BUN SERPL-MCNC: 11 MG/DL (ref 6–20)
BUN/CREAT SERPL: 9 (ref 12–20)
CALCIUM SERPL-MCNC: 9.2 MG/DL (ref 8.5–10.1)
CHLORIDE SERPL-SCNC: 103 MMOL/L (ref 97–108)
CO2 SERPL-SCNC: 29 MMOL/L (ref 21–32)
CREAT SERPL-MCNC: 1.28 MG/DL (ref 0.55–1.02)
DIFFERENTIAL METHOD BLD: ABNORMAL
EOSINOPHIL # BLD: 0.45 K/UL (ref 0–0.4)
EOSINOPHIL NFR BLD: 3.7 % (ref 0–7)
ERYTHROCYTE [DISTWIDTH] IN BLOOD BY AUTOMATED COUNT: 16.4 % (ref 11.5–14.5)
GLUCOSE SERPL-MCNC: 289 MG/DL (ref 65–100)
HCT VFR BLD AUTO: 37.8 % (ref 35–47)
HGB BLD-MCNC: 10.8 G/DL (ref 11.5–16)
IMM GRANULOCYTES # BLD AUTO: 0.05 K/UL (ref 0–0.04)
IMM GRANULOCYTES NFR BLD AUTO: 0.4 % (ref 0–0.5)
LYMPHOCYTES # BLD: 2.21 K/UL (ref 0.8–3.5)
LYMPHOCYTES NFR BLD: 18.3 % (ref 12–49)
MCH RBC QN AUTO: 24.1 PG (ref 26–34)
MCHC RBC AUTO-ENTMCNC: 28.6 G/DL (ref 30–36.5)
MCV RBC AUTO: 84.2 FL (ref 80–99)
MONOCYTES # BLD: 0.67 K/UL (ref 0–1)
MONOCYTES NFR BLD: 5.5 % (ref 5–13)
NEUTS SEG # BLD: 8.67 K/UL (ref 1.8–8)
NEUTS SEG NFR BLD: 71.7 % (ref 32–75)
NRBC # BLD: 0 K/UL (ref 0–0.01)
NRBC BLD-RTO: 0 PER 100 WBC
PLATELET # BLD AUTO: 363 K/UL (ref 150–400)
PMV BLD AUTO: 10.4 FL (ref 8.9–12.9)
POTASSIUM SERPL-SCNC: 3.5 MMOL/L (ref 3.5–5.1)
RBC # BLD AUTO: 4.49 M/UL (ref 3.8–5.2)
RBC MORPH BLD: ABNORMAL
RBC MORPH BLD: ABNORMAL
SODIUM SERPL-SCNC: 140 MMOL/L (ref 136–145)
WBC # BLD AUTO: 12.1 K/UL (ref 3.6–11)

## 2025-07-02 ASSESSMENT — ENCOUNTER SYMPTOMS
WHEEZING: 0
COUGH: 1
COLOR CHANGE: 1
SHORTNESS OF BREATH: 1

## 2025-07-03 NOTE — PROGRESS NOTES
History of Present Illness  Rosmery Malloy is a 74 y.o. female who presents for evaluation of skin tightness and roughness around the ankle, recent pneumonia, and cough.    She reports a sensation of skin tightness and roughness around her ankle, which has been ongoing for several months. The condition is characterized by intermittent swelling that subsides but leaves the skin feeling tight, particularly at night, disrupting her sleep due to itching. The skin on the back of her heel is notably rough, and she has developed a habit of scratching it, leading to tenderness. Her feet are frequently cold. Despite applying various products such as Vaseline, Neutrogena lotion, and Neosporin, the dryness and peeling persist. She has been taking Lasix for fluid retention but discontinued it a few days ago due to increased urination. Elevating her feet provides some relief.    She recently recovered from pneumonia, which was diagnosed two weeks ago. She continues to experience a cough and shortness of breath, particularly when walking. She was prescribed three medications, including an antibiotic and Mucinex 600 mg for her cough.    She has an upcoming appointment with her urologist on 07/17/2025 for a sling procedure. She expresses concern about potential keloid scarring from the surgery.      Review of Systems   Constitutional:  Positive for activity change. Negative for appetite change and fever.   Respiratory:  Positive for cough and shortness of breath. Negative for wheezing.    Cardiovascular:  Negative for chest pain.   Skin:  Positive for color change and rash.   Neurological:  Negative for dizziness and light-headedness.   All other systems reviewed and are negative.          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    DDD

## 2025-08-04 ENCOUNTER — CARE COORDINATION (OUTPATIENT)
Dept: OTHER | Facility: CLINIC | Age: 75
End: 2025-08-04

## 2025-08-11 ENCOUNTER — CARE COORDINATION (OUTPATIENT)
Dept: OTHER | Facility: CLINIC | Age: 75
End: 2025-08-11

## (undated) DEVICE — NEEDLE HYPO 18GA L1.5IN PNK S STL HUB POLYPR SHLD REG BVL

## (undated) DEVICE — SYR 10ML LUER LOK 1/5ML GRAD --

## (undated) DEVICE — STERILE POLYISOPRENE POWDER-FREE SURGICAL GLOVES: Brand: PROTEXIS

## (undated) DEVICE — FCPS BX HOT RJ4 2.2MMX240CM -- RADIAL JAW 4 BX/40

## (undated) DEVICE — JELLY,LUBE,STERILE,FLIP TOP,TUBE,4-OZ: Brand: MEDLINE

## (undated) DEVICE — AIRLIFE™ U/CONNECT-IT OXYGEN TUBING 7 FEET (2.1 M) CRUSH-RESISTANT OXYGEN TUBING, VINYL TIPPED: Brand: AIRLIFE™

## (undated) DEVICE — PACK,LITHOTOMY,PK I: Brand: MEDLINE

## (undated) DEVICE — SUPPLEMENT DIGESTIVE H2O SOL GI-EASE

## (undated) DEVICE — ORISE PROKNIFE 3.0 MM ELECTRODE: Brand: ORISE™ PROKNIFE

## (undated) DEVICE — ORISE PROKNIFE 1.5 MM ELECTRODE: Brand: ORISE™ PROKNIFE

## (undated) DEVICE — SOLUTION IRRIG 3000ML 0.9% SOD CHL FLX CONT 0797208] ICU MEDICAL INC]

## (undated) DEVICE — X-RAY SPONGES,16 PLY: Brand: DERMACEA

## (undated) DEVICE — BAG SPEC BIOHZD LF 2MIL 6X10IN -- CONVERT TO ITEM 357326

## (undated) DEVICE — PREP PAD BNS: Brand: CONVERTORS

## (undated) DEVICE — TUBE ST FLD CTRL AQUILEX INFLO --

## (undated) DEVICE — CANN NASAL O2 CAPNOGRAPHY AD -- FILTERLINE

## (undated) DEVICE — CONTAINER SPEC 20 ML LID NEUT BUFF FORMALIN 10 % POLYPR STS

## (undated) DEVICE — TUBE ST FLD AQUILEX OUTFLO --

## (undated) DEVICE — 1200 GUARD II KIT W/5MM TUBE W/O VAC TUBE: Brand: GUARDIAN

## (undated) DEVICE — DEVON™ KNEE AND BODY STRAP 60" X 3" (1.5 M X 7.6 CM): Brand: DEVON

## (undated) DEVICE — SYRINGE MED 20ML STD CLR PLAS LUERLOCK TIP N CTRL DISP

## (undated) DEVICE — CONNECTOR TBNG AUX H2O JET DISP FOR OLY 160/180 SER

## (undated) DEVICE — TELFA NON-ADHERENT ABSORBENT DRESSING: Brand: TELFA

## (undated) DEVICE — INFECTION CONTROL KIT SYS

## (undated) DEVICE — Z DISCONTINUED USE 2751540 TUBING IRRIG L10IN DISP PMP ENDOGATOR

## (undated) DEVICE — BAG BELONG PT PERS CLEAR HANDL

## (undated) DEVICE — KIT IV STRT W CHLORAPREP PD 1ML

## (undated) DEVICE — ESOPHAGEAL BALLOON DILATATION CATHETER: Brand: CRE FIXED WIRE

## (undated) DEVICE — Device

## (undated) DEVICE — REM POLYHESIVE ADULT PATIENT RETURN ELECTRODE: Brand: VALLEYLAB

## (undated) DEVICE — NEEDLE SPNL 22GA L3.5IN BLK HUB S STL REG WALL FIT STYL W/

## (undated) DEVICE — TRAY PREP DRY W/ PREM GLV 2 APPL 6 SPNG 2 UNDPD 1 OVERWRAP

## (undated) DEVICE — SET SEALS HYSTEROSCOPE DISP -- MYOSURE  EA=10

## (undated) DEVICE — SET ADMIN 16ML TBNG L100IN 2 Y INJ SITE IV PIGGY BK DISP

## (undated) DEVICE — CATH URETH INTMIT ROB 14FR FUN -- USE ITEM 179521

## (undated) DEVICE — NEONATAL-ADULT SPO2 SENSOR: Brand: NELLCOR

## (undated) DEVICE — SOLIDIFIER FLUID 3000 CC ABSORB

## (undated) DEVICE — FORCEPS BX L240CM JAW DIA2.8MM L CAP W/ NDL MIC MESH TOOTH

## (undated) DEVICE — Z INACTIVE USE 2527070 DRAPE SURG W40XL44IN UNDERBUTTOCK SMS POLYPR W/ PCH BK DISP

## (undated) DEVICE — QUILTED PREMIUM COMFORT UNDERPAD,EXTRA HEAVY: Brand: WINGS

## (undated) DEVICE — Z DISCONTINUED NO SUB IDED SET EXTN W/ 4 W STPCOCK M SPIN LOK 36IN

## (undated) DEVICE — BW-412T DISP COMBO CLEANING BRUSH: Brand: SINGLE USE COMBINATION CLEANING BRUSH

## (undated) DEVICE — SYRINGE INFL 60ML DISP ALLIANCE II

## (undated) DEVICE — KENDALL RADIOLUCENT FOAM MONITORING ELECTRODE -RECTANGULAR SHAPE: Brand: KENDALL

## (undated) DEVICE — KENDALL SCD EXPRESS SLEEVES, KNEE LENGTH, MEDIUM: Brand: KENDALL SCD

## (undated) DEVICE — PAD SANIT NPKN 4IN GRD

## (undated) DEVICE — LIGHT HANDLE: Brand: DEVON

## (undated) DEVICE — CATH IV AUTOGRD BC BLU 22GA 25 -- INSYTE

## (undated) DEVICE — ENDO CARRY-ON PROCEDURE KIT INCLUDES ENZYMATIC SPONGE, GAUZE, BIOHAZARD LABEL, TRAY, LUBRICANT, DIRTY SCOPE LABEL, WATER LABEL, TRAY, DRAWSTRING PAD, AND DEFENDO 4-PIECE KIT.: Brand: ENDO CARRY-ON PROCEDURE KIT

## (undated) DEVICE — TUBING HYDR IRR --